# Patient Record
Sex: MALE | Race: WHITE | NOT HISPANIC OR LATINO | Employment: UNEMPLOYED | URBAN - METROPOLITAN AREA
[De-identification: names, ages, dates, MRNs, and addresses within clinical notes are randomized per-mention and may not be internally consistent; named-entity substitution may affect disease eponyms.]

---

## 2019-12-09 ENCOUNTER — HOSPITAL ENCOUNTER (EMERGENCY)
Facility: HOSPITAL | Age: 64
Discharge: NURSING, REHAB OR LONG-TERM CARE | End: 2019-12-09
Attending: EMERGENCY MEDICINE | Admitting: EMERGENCY MEDICINE

## 2019-12-09 ENCOUNTER — APPOINTMENT (OUTPATIENT)
Dept: GENERAL RADIOLOGY | Facility: HOSPITAL | Age: 64
End: 2019-12-09

## 2019-12-09 ENCOUNTER — APPOINTMENT (OUTPATIENT)
Dept: CT IMAGING | Facility: HOSPITAL | Age: 64
End: 2019-12-09

## 2019-12-09 VITALS
HEIGHT: 68 IN | OXYGEN SATURATION: 95 % | WEIGHT: 176.37 LBS | BODY MASS INDEX: 26.73 KG/M2 | TEMPERATURE: 99.8 F | HEART RATE: 79 BPM | DIASTOLIC BLOOD PRESSURE: 93 MMHG | RESPIRATION RATE: 16 BRPM | SYSTOLIC BLOOD PRESSURE: 160 MMHG
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
## 2019-12-09 DIAGNOSIS — S30.0XXA CONTUSION OF COCCYX, INITIAL ENCOUNTER: ICD-10-CM

## 2019-12-09 DIAGNOSIS — S00.03XA CONTUSION OF SCALP, INITIAL ENCOUNTER: Primary | ICD-10-CM

## 2019-12-09 DIAGNOSIS — S13.9XXA NECK SPRAIN, INITIAL ENCOUNTER: ICD-10-CM

## 2019-12-09 PROCEDURE — 72170 X-RAY EXAM OF PELVIS: CPT

## 2019-12-09 PROCEDURE — 72125 CT NECK SPINE W/O DYE: CPT

## 2019-12-09 PROCEDURE — 70450 CT HEAD/BRAIN W/O DYE: CPT

## 2019-12-09 PROCEDURE — 72110 X-RAY EXAM L-2 SPINE 4/>VWS: CPT

## 2019-12-09 PROCEDURE — 72220 X-RAY EXAM SACRUM TAILBONE: CPT

## 2019-12-09 PROCEDURE — 99283 EMERGENCY DEPT VISIT LOW MDM: CPT

## 2019-12-09 RX ORDER — ACETAMINOPHEN 500 MG
1000 TABLET ORAL ONCE
Status: DISCONTINUED | OUTPATIENT
Start: 2019-12-09 | End: 2019-12-09 | Stop reason: HOSPADM

## 2019-12-09 NOTE — DISCHARGE INSTRUCTIONS
Neurochecks every 4 hours return for vomiting mental status changes unequal pupils uncontrolled pain or any other new or worsening problems or concerns.  Tylenol.  Turn for any other new or worse problems or concerns

## 2019-12-09 NOTE — ED PROVIDER NOTES
Subjective   Chief complaint fall    History of present illness 64-year-old male fell at the rehab center in the bathroom.  No loss of conscious no syncope he hit his head complains of pain to his head and tailbone area.  Moderate degree worse with movement better with rest continuous for last couple hours.  Aching throbbing in nature.  No chest or abdominal pain.  No vomiting.  No visual changes.  No numbness or tingling.          Review of Systems   Constitutional: Negative for chills and fever.   Respiratory: Negative for chest tightness and shortness of breath.    Gastrointestinal: Negative for abdominal pain and vomiting.   Musculoskeletal: Positive for back pain and neck pain.   Skin: Negative for color change and pallor.   Neurological: Negative for dizziness and syncope.   Psychiatric/Behavioral: Negative for agitation and behavioral problems.       Past Medical History:   Diagnosis Date   • Antisocial personality disorder (CMS/HCC)    • Bipolar disorder (CMS/HCC)    • Chronic kidney disease    • Convulsions (CMS/HCC)    • Diabetes mellitus (CMS/HCC)    • Hypertension    • Insomnia    • Renal disorder    • Schizophrenia (CMS/HCC)        No Known Allergies    History reviewed. No pertinent surgical history.    History reviewed. No pertinent family history.    Social History     Socioeconomic History   • Marital status:      Spouse name: Not on file   • Number of children: Not on file   • Years of education: Not on file   • Highest education level: Not on file   Tobacco Use   • Smoking status: Current Some Day Smoker     Packs/day: 0.50   • Smokeless tobacco: Never Used   Substance and Sexual Activity   • Alcohol use: Never     Frequency: Never   • Drug use: Never   • Sexual activity: Defer       Prior to Admission medications    Not on File     No current medication list available  [place] list became available patient is on valproic acid Tylenol atenolol insulin lisinopril melatonin  Seroquel  Objective   Physical Exam  64-year-old awake alert no acute distress HEENT contusion to the back of the head but no step-off or deformity extraocular muscles intact pupils equal and reactive no raccoon or rodgers sign no facial pain some mild cervical spine tenderness but no step-off or deformity trachea midline chest wall nontender good breath sounds bilaterally heart regular without murmur abdomen soft without tenderness no bruising or masses pelvis is stable extremities full range of motion no obvious deformities throughout the arms or legs no shortening rotation to the legs.  No thoracic spine tenderness mild lumbar and tailbone tenderness but no step-off or deformity.  Pelvis is stable he is awake alert follows commands Alex Coma Scale 15  Procedures           ED Course      No results found for this or any previous visit.  Xr Sacrum & Coccyx    Result Date: 12/9/2019  No acute sacral or coccygeal findings. Degenerative changes of the lumbar spine at L4-5.  Electronically Signed By-Dr. Jaycee Ley MD On:12/9/2019 2:55 PM This report was finalized on [id number] by Dr. Jaycee Ley MD.    Ct Head Without Contrast    Result Date: 12/9/2019  1.No acute intracranial process or calvarial fracture identified. 2.Findings suggestive of mild chronic small vessel ischemic disease. 3.Mild mucosal disease along right maxillary sinus.  Electronically Signed By-DR. Javed Wiggins MD On:12/9/2019 3:22 PM This report was finalized on [id number] by DR. Javed Wiggins MD.    Ct Cervical Spine Without Contrast    Result Date: 12/9/2019  No acute fracture or traumatic subluxation of cervical spine.  Electronically Signed By-DR. Javed Wiggins MD On:12/9/2019 3:29 PM This report was finalized on [id number] by DR. Javed Wiggins MD.    Xr Pelvis 1 Or 2 View    Result Date: 12/9/2019  No acute pelvic or hip findings. Mild degenerative changes at L4-5.  Electronically Signed By-Dr. Jaycee Ley MD  On:12/9/2019 2:54 PM This report was finalized on [id number] by Dr. Jaycee Ley MD.    Xr Spine Lumbar Complete 4+vw    Result Date: 12/9/2019  Mild degenerative changes lumbar spine greatest at L4-5 and L5-S1. No acute lumbar spine findings.  Electronically Signed By-Dr. Jaycee Ley MD On:12/9/2019 2:56 PM This report was finalized on [id number] by Dr. Jaycee Ley MD.    Medications   acetaminophen (TYLENOL) tablet 1,000 mg (has no administration in time range)                       No data recorded                        MDM  Number of Diagnoses or Management Options  Contusion of coccyx, initial encounter:   Contusion of scalp, initial encounter:   Fall, initial encounter:   Neck sprain, initial encounter:   Diagnosis management comments: Medical decision making.  Patient had the above exam evaluation was given Tylenol for pain.  CT head without was negative cervical spine no fractures x-ray of the pelvis and coccyx and lumbar spine no fractures.  On repeat exam he had [place] Coma Scale 15 he was resting currently no distress.  We talked about the findings.  He looks well and he will be discharged back to the nursing home under heading precautions.  Stable unremarkable ER course.      Final diagnoses:   Contusion of scalp, initial encounter   Neck sprain, initial encounter   Contusion of coccyx, initial encounter   Fall, initial encounter              Drake Baptiste MD  12/09/19 154       Drake Baptiste MD  12/09/19 7957

## 2020-01-03 ENCOUNTER — HOSPITAL ENCOUNTER (EMERGENCY)
Facility: HOSPITAL | Age: 65
Discharge: HOME | End: 2020-01-03
Admitting: EMERGENCY MEDICINE

## 2020-01-03 ENCOUNTER — APPOINTMENT (OUTPATIENT)
Dept: GENERAL RADIOLOGY | Facility: HOSPITAL | Age: 65
End: 2020-01-03

## 2020-01-03 VITALS
OXYGEN SATURATION: 97 % | HEIGHT: 74 IN | SYSTOLIC BLOOD PRESSURE: 146 MMHG | RESPIRATION RATE: 16 BRPM | TEMPERATURE: 97.9 F | BODY MASS INDEX: 23.77 KG/M2 | DIASTOLIC BLOOD PRESSURE: 72 MMHG | HEART RATE: 82 BPM | WEIGHT: 185.19 LBS
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
## 2020-01-03 DIAGNOSIS — M25.561 ACUTE PAIN OF RIGHT KNEE: Primary | ICD-10-CM

## 2020-01-03 DIAGNOSIS — M25.461 EFFUSION OF RIGHT KNEE: ICD-10-CM

## 2020-01-03 PROCEDURE — 73562 X-RAY EXAM OF KNEE 3: CPT

## 2020-01-03 PROCEDURE — 99283 EMERGENCY DEPT VISIT LOW MDM: CPT

## 2020-01-03 NOTE — ED PROVIDER NOTES
Subjective   History of Present Illness  Patient is a 64-year-old male who is a poor historian presents via EMS with complaints of right knee pain and swelling for the past week.  Patient states he did trip and fall about a week ago and has had pain since.  Scribes as a constant sharp type pain without any radiation of the pain from his knee.  Denies any increased redness that he is noticed.  Currently rates pain is mild.  Denies any paresthesias numbness or weakness.  Patient states he does live at a nursing and rehabilitation center.  He denies any chest pain or shortness of breath  Review of Systems   Constitutional: Negative.    Respiratory: Negative.    Cardiovascular: Negative.    Musculoskeletal: Positive for arthralgias, gait problem and joint swelling. Negative for neck pain and neck stiffness.   Skin: Negative.    Neurological: Negative for dizziness, seizures, syncope, weakness, numbness and headaches.       Past Medical History:   Diagnosis Date   • Antisocial personality disorder (CMS/HCC)    • Bipolar disorder (CMS/HCC)    • Chronic kidney disease    • Convulsions (CMS/[facility])    • Diabetes mellitus (CMS/[facility])    • Hypertension    • Insomnia    • Renal disorder    • Schizophrenia (CMS/[facility])        No Known Allergies    History reviewed. No pertinent surgical history.    History reviewed. No pertinent family history.    Social History     Socioeconomic History   • Marital status:      Spouse name: Not on file   • Number of children: Not on file   • Years of education: Not on file   • Highest education level: Not on file   Tobacco Use   • Smoking status: Current Some Day Smoker     Packs/day: 0.50   • Smokeless tobacco: Never Used   Substance and Sexual Activity   • Alcohol use: Never     Frequency: Never   • Drug use: Never   • Sexual activity: Defer           Objective   Physical Exam   Constitutional: He is oriented to person, place, and time. He appears well-developed and well-nourished. No  "distress.   HENT:   Head: Normocephalic and atraumatic.   Eyes: Pupils are equal, round, and reactive to light. EOM are normal. No scleral icterus.   Cardiovascular: Normal rate, regular rhythm, normal heart sounds and intact distal pulses. Exam reveals no gallop and no friction rub.   No murmur heard.  Pulmonary/Chest: Effort normal and breath sounds normal. No stridor. He has no wheezes. He has no rales.   Musculoskeletal: He exhibits tenderness. He exhibits no edema or deformity.        Right knee: He exhibits decreased range of motion. He exhibits no swelling, no effusion, no ecchymosis, no deformity, no laceration, no erythema and normal patellar mobility.        Left knee: Normal.   Decrease range of motion with flexion extension secondary to pain.  No overlying erythema edema warmth noted of anterior posterior drawer test.  No joint laxity with varus valgus stress.  Tenderness to palpation along the anterior region.  Compartment soft negative Homans sign.   Neurological: He is alert and oriented to person, place, and time. No cranial nerve deficit or sensory deficit.   Skin: Skin is warm. Capillary refill takes less than 2 seconds. He is not diaphoretic. No erythema. No pallor.   Psychiatric: He has a normal mood and affect. His behavior is normal.   Nursing note and vitals reviewed.      Procedures           ED Course    /77   Pulse 57   Temp 97.9 °F (36.6 °C)   Resp 16   Ht 188 cm (74\")   Wt 84 kg (185 lb 3 oz)   SpO2 96%   BMI 23.78 kg/m²   Medications - No data to display  Labs Reviewed - No data to display  Xr Knee 3 View Right    Result Date: 1/3/2020  1. Moderate suprapatellar joint effusion without acute osseous abnormality. 2. Mild medial compartment joint space narrowing without significant osteophyte formation.  Electronically Signed By-Rakesh Benavidez On:1/3/2020 1:08 PM This report was finalized on [id number] by  Rakesh Benavidez, .                                            "     MDM  Number of Diagnoses or Management Options  Diagnosis management comments: Chart Review:  Comorbidity: Schizophrenia bipolar diabetes antisocial personality CKD hypertension  Differentials: Fracture dislocation sprain effusion septic arthritis    ;this list is not all inclusive and does not constitute the entirety of considered causes  Imaging: Was interpreted by physician and reviewed by myself:  Xr Knee 3 View Right  Result Date: 1/3/2020  1. Moderate suprapatellar joint effusion without acute osseous abnormality. 2. Mild medial compartment joint space narrowing without significant osteophyte formation.  Electronically Signed By-Rakesh Benavidez On:1/3/2020 1:08 PM This report was finalized on [id number] by  Rakesh Benavidez, .    Disposition/Treatment:  While in the ED patient was afebrile he appeared nontoxic there are no signs of secondary infection of the right knee.  X-ray as described above shows joint effusion with mild medial compartment joint space narrowing.  Patient was given a knee sleeve he is advised follow-up with orthopedics for further evaluation.  Patient was ambulatory while in the ED patient walked from the bed to the door asking for food.  Lab results and findings were discussed with the patient who voiced understanding of discharge instructions along with signs and symptoms requiring return to the ED.  Upon discharged patient was in stable condition with followup for a revaluation.  He was advised to continue ibuprofen as previously prescribed.      Final diagnoses:   Acute pain of right knee   Fall, initial encounter   Effusion of right knee            Chanda Salcedo PA  01/03/20 5522

## 2020-01-03 NOTE — ED NOTES
Swelling to right knee x 1 week. Patient from [facility] and had xray done there. Xray was negative. Unable to walk on right leg     Dee Emerson RN  01/03/20 [id number]

## 2020-01-03 NOTE — DISCHARGE INSTRUCTIONS
Wear knee sleeve for the next 35 days as needed for pain and comfort.  Use walker for ambulation.  Continue to use ibuprofen as previously prescribed for your pain.    Follow-up with Dr. Carter's office as listed below for further evaluation.    Follow-up with your primary care provider in 3-5 days.  If you do not have a primary care provider call [phone] for help in finding one, or you may follow up with [facility] at [phone].    Return to ED for any new or worsening symptoms including increased redness, swelling, pain of your knee or fever.

## 2020-01-28 ENCOUNTER — APPOINTMENT (OUTPATIENT)
Dept: CT IMAGING | Facility: HOSPITAL | Age: 65
End: 2020-01-28

## 2020-01-28 ENCOUNTER — HOSPITAL ENCOUNTER (EMERGENCY)
Facility: HOSPITAL | Age: 65
Discharge: NURSING, REHAB OR LONG-TERM CARE | End: 2020-01-29
Attending: EMERGENCY MEDICINE | Admitting: EMERGENCY MEDICINE

## 2020-01-28 VITALS
TEMPERATURE: 98 F | SYSTOLIC BLOOD PRESSURE: 156 MMHG | RESPIRATION RATE: 16 BRPM | OXYGEN SATURATION: 94 % | WEIGHT: 190 LBS | HEART RATE: 66 BPM | HEIGHT: 73 IN | BODY MASS INDEX: 25.18 KG/M2 | DIASTOLIC BLOOD PRESSURE: 66 MMHG

## 2020-01-28 DIAGNOSIS — S00.03XA CONTUSION OF SCALP, INITIAL ENCOUNTER: Primary | ICD-10-CM

## 2020-01-28 DIAGNOSIS — S01.81XA FACIAL LACERATION, INITIAL ENCOUNTER: ICD-10-CM

## 2020-01-28 PROCEDURE — 70450 CT HEAD/BRAIN W/O DYE: CPT

## 2020-01-28 PROCEDURE — 99283 EMERGENCY DEPT VISIT LOW MDM: CPT

## 2020-01-29 ENCOUNTER — LAB REQUISITION (OUTPATIENT)
Dept: LAB | Facility: HOSPITAL | Age: 65
End: 2020-01-29

## 2020-01-29 DIAGNOSIS — N18.9 CHRONIC KIDNEY DISEASE, UNSPECIFIED: ICD-10-CM

## 2020-01-29 DIAGNOSIS — N17.9 ACUTE KIDNEY FAILURE, UNSPECIFIED (HCC): ICD-10-CM

## 2020-01-29 DIAGNOSIS — E11.9 TYPE 2 DIABETES MELLITUS WITHOUT COMPLICATIONS (HCC): ICD-10-CM

## 2020-01-29 LAB
ALBUMIN SERPL-MCNC: 3.1 G/DL (ref 3.5–5.2)
ALBUMIN/GLOB SERPL: 0.9 G/DL
ALP SERPL-CCNC: 90 U/L (ref 39–117)
ALT SERPL W P-5'-P-CCNC: 23 U/L (ref 1–41)
ANION GAP SERPL CALCULATED.3IONS-SCNC: 9 MMOL/L (ref 5–15)
AST SERPL-CCNC: 27 U/L (ref 1–40)
BASOPHILS # BLD AUTO: 0 10*3/MM3 (ref 0–0.2)
BASOPHILS NFR BLD AUTO: 0.6 % (ref 0–1.5)
BILIRUB SERPL-MCNC: 0.4 MG/DL (ref 0.2–1.2)
BUN BLD-MCNC: 13 MG/DL (ref 8–23)
BUN/CREAT SERPL: 13.1 (ref 7–25)
CALCIUM SPEC-SCNC: 8.9 MG/DL (ref 8.6–10.5)
CHLORIDE SERPL-SCNC: 102 MMOL/L (ref 98–107)
CO2 SERPL-SCNC: 30 MMOL/L (ref 22–29)
CREAT BLD-MCNC: 0.99 MG/DL (ref 0.76–1.27)
DEPRECATED RDW RBC AUTO: 53.8 FL (ref 37–54)
EOSINOPHIL # BLD AUTO: 0 10*3/MM3 (ref 0–0.4)
EOSINOPHIL NFR BLD AUTO: 0.8 % (ref 0.3–6.2)
ERYTHROCYTE [DISTWIDTH] IN BLOOD BY AUTOMATED COUNT: 17.1 % (ref 12.3–15.4)
GFR SERPL CREATININE-BSD FRML MDRD: 77 ML/MIN/1.73
GLOBULIN UR ELPH-MCNC: 3.5 GM/DL
GLUCOSE BLD-MCNC: 137 MG/DL (ref 65–99)
HCT VFR BLD AUTO: 32.2 % (ref 37.5–51)
HGB BLD-MCNC: 10.7 G/DL (ref 13–17.7)
LYMPHOCYTES # BLD AUTO: 1.6 10*3/MM3 (ref 0.7–3.1)
LYMPHOCYTES NFR BLD AUTO: 45 % (ref 19.6–45.3)
MCH RBC QN AUTO: 30 PG (ref 26.6–33)
MCHC RBC AUTO-ENTMCNC: 33.2 G/DL (ref 31.5–35.7)
MCV RBC AUTO: 90.4 FL (ref 79–97)
MONOCYTES # BLD AUTO: 0.3 10*3/MM3 (ref 0.1–0.9)
MONOCYTES NFR BLD AUTO: 7.7 % (ref 5–12)
NEUTROPHILS # BLD AUTO: 1.6 10*3/MM3 (ref 1.7–7)
NEUTROPHILS NFR BLD AUTO: 45.9 % (ref 42.7–76)
NRBC BLD AUTO-RTO: 0.1 /100 WBC (ref 0–0.2)
PLATELET # BLD AUTO: 81 10*3/MM3 (ref 140–450)
PMV BLD AUTO: 8.9 FL (ref 6–12)
POTASSIUM BLD-SCNC: 4.3 MMOL/L (ref 3.5–5.2)
PROT SERPL-MCNC: 6.6 G/DL (ref 6–8.5)
RBC # BLD AUTO: 3.57 10*6/MM3 (ref 4.14–5.8)
SODIUM BLD-SCNC: 141 MMOL/L (ref 136–145)
WBC NRBC COR # BLD: 3.6 10*3/MM3 (ref 3.4–10.8)

## 2020-01-29 PROCEDURE — 85025 COMPLETE CBC W/AUTO DIFF WBC: CPT | Performed by: INTERNAL MEDICINE

## 2020-01-29 PROCEDURE — 80053 COMPREHEN METABOLIC PANEL: CPT | Performed by: INTERNAL MEDICINE

## 2020-02-29 ENCOUNTER — APPOINTMENT (OUTPATIENT)
Dept: CT IMAGING | Facility: HOSPITAL | Age: 65
End: 2020-02-29

## 2020-02-29 ENCOUNTER — APPOINTMENT (OUTPATIENT)
Dept: GENERAL RADIOLOGY | Facility: HOSPITAL | Age: 65
End: 2020-02-29

## 2020-02-29 ENCOUNTER — HOSPITAL ENCOUNTER (EMERGENCY)
Facility: HOSPITAL | Age: 65
Discharge: HOME | End: 2020-02-29
Attending: EMERGENCY MEDICINE | Admitting: EMERGENCY MEDICINE

## 2020-02-29 VITALS
WEIGHT: 172 LBS | SYSTOLIC BLOOD PRESSURE: 189 MMHG | OXYGEN SATURATION: 96 % | HEART RATE: 84 BPM | DIASTOLIC BLOOD PRESSURE: 92 MMHG | BODY MASS INDEX: 25.48 KG/M2 | TEMPERATURE: 98 F | RESPIRATION RATE: 16 BRPM | HEIGHT: 69 IN

## 2020-02-29 DIAGNOSIS — S09.90XA INJURY OF HEAD, INITIAL ENCOUNTER: ICD-10-CM

## 2020-02-29 DIAGNOSIS — R03.0 ELEVATED BLOOD PRESSURE READING: ICD-10-CM
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
## 2020-02-29 DIAGNOSIS — S39.012A LUMBOSACRAL STRAIN, INITIAL ENCOUNTER: ICD-10-CM

## 2020-02-29 LAB
BASOPHILS # BLD AUTO: 0 10*3/MM3 (ref 0–0.2)
BASOPHILS NFR BLD AUTO: 0.5 % (ref 0–1.5)
DEPRECATED RDW RBC AUTO: 52.5 FL (ref 37–54)
EOSINOPHIL # BLD AUTO: 0 10*3/MM3 (ref 0–0.4)
EOSINOPHIL NFR BLD AUTO: 0.2 % (ref 0.3–6.2)
ERYTHROCYTE [DISTWIDTH] IN BLOOD BY AUTOMATED COUNT: 16.8 % (ref 12.3–15.4)
FLUAV SUBTYP SPEC NAA+PROBE: NOT DETECTED
FLUBV RNA ISLT QL NAA+PROBE: NOT DETECTED
HCT VFR BLD AUTO: 31.2 % (ref 37.5–51)
HGB BLD-MCNC: 10.6 G/DL (ref 13–17.7)
INR PPP: 1.08 (ref 0.9–1.1)
LYMPHOCYTES # BLD AUTO: 1.3 10*3/MM3 (ref 0.7–3.1)
LYMPHOCYTES NFR BLD AUTO: 30.8 % (ref 19.6–45.3)
MCH RBC QN AUTO: 30.1 PG (ref 26.6–33)
MCHC RBC AUTO-ENTMCNC: 34 G/DL (ref 31.5–35.7)
MCV RBC AUTO: 88.7 FL (ref 79–97)
MONOCYTES # BLD AUTO: 0.4 10*3/MM3 (ref 0.1–0.9)
MONOCYTES NFR BLD AUTO: 8.9 % (ref 5–12)
NEUTROPHILS # BLD AUTO: 2.5 10*3/MM3 (ref 1.7–7)
NEUTROPHILS NFR BLD AUTO: 59.6 % (ref 42.7–76)
NRBC BLD AUTO-RTO: 0 /100 WBC (ref 0–0.2)
PLATELET # BLD AUTO: 110 10*3/MM3 (ref 140–450)
PMV BLD AUTO: 8.4 FL (ref 6–12)
PROTHROMBIN TIME: 11.2 SECONDS (ref 9.6–11.7)
RBC # BLD AUTO: 3.52 10*6/MM3 (ref 4.14–5.8)
TROPONIN T SERPL-MCNC: <0.01 NG/ML (ref 0–0.03)
WBC NRBC COR # BLD: 4.3 10*3/MM3 (ref 3.4–10.8)

## 2020-02-29 PROCEDURE — 85025 COMPLETE CBC W/AUTO DIFF WBC: CPT | Performed by: EMERGENCY MEDICINE

## 2020-02-29 PROCEDURE — 71045 X-RAY EXAM CHEST 1 VIEW: CPT

## 2020-02-29 PROCEDURE — 85610 PROTHROMBIN TIME: CPT | Performed by: EMERGENCY MEDICINE

## 2020-02-29 PROCEDURE — 72125 CT NECK SPINE W/O DYE: CPT

## 2020-02-29 PROCEDURE — 72170 X-RAY EXAM OF PELVIS: CPT

## 2020-02-29 PROCEDURE — 72110 X-RAY EXAM L-2 SPINE 4/>VWS: CPT

## 2020-02-29 PROCEDURE — 99284 EMERGENCY DEPT VISIT MOD MDM: CPT

## 2020-02-29 PROCEDURE — 70450 CT HEAD/BRAIN W/O DYE: CPT

## 2020-02-29 PROCEDURE — 84484 ASSAY OF TROPONIN QUANT: CPT | Performed by: EMERGENCY MEDICINE

## 2020-02-29 PROCEDURE — 87502 INFLUENZA DNA AMP PROBE: CPT | Performed by: EMERGENCY MEDICINE

## 2020-02-29 RX ORDER — SODIUM CHLORIDE 0.9 % (FLUSH) 0.9 %
10 SYRINGE (ML) INJECTION AS NEEDED
Status: DISCONTINUED | OUTPATIENT
Start: 2020-02-29 | End: 2020-03-01 | Stop reason: HOSPADM

## 2020-08-09 ENCOUNTER — APPOINTMENT (OUTPATIENT)
Dept: GENERAL RADIOLOGY | Facility: HOSPITAL | Age: 65
End: 2020-08-09

## 2020-08-09 ENCOUNTER — HOSPITAL ENCOUNTER (EMERGENCY)
Facility: HOSPITAL | Age: 65
Discharge: HOME | End: 2020-08-09
Attending: EMERGENCY MEDICINE | Admitting: EMERGENCY MEDICINE

## 2020-08-09 VITALS
TEMPERATURE: 98 F | HEIGHT: 69 IN | DIASTOLIC BLOOD PRESSURE: 81 MMHG | OXYGEN SATURATION: 99 % | RESPIRATION RATE: 22 BRPM | BODY MASS INDEX: 25.18 KG/M2 | SYSTOLIC BLOOD PRESSURE: 175 MMHG | WEIGHT: 170 LBS | HEART RATE: 66 BPM

## 2020-08-09 DIAGNOSIS — J44.1 COPD WITH ACUTE EXACERBATION (HCC): Primary | ICD-10-CM

## 2020-08-09 LAB
ALBUMIN SERPL-MCNC: 3.8 G/DL (ref 3.5–5.2)
ALBUMIN/GLOB SERPL: 1.1 G/DL
ALP SERPL-CCNC: 83 U/L (ref 39–117)
ALT SERPL W P-5'-P-CCNC: 35 U/L (ref 1–41)
ANION GAP SERPL CALCULATED.3IONS-SCNC: 10 MMOL/L (ref 5–15)
ARTERIAL PATENCY WRIST A: POSITIVE
AST SERPL-CCNC: 38 U/L (ref 1–40)
ATMOSPHERIC PRESS: ABNORMAL MM[HG]
B PARAPERT DNA SPEC QL NAA+PROBE: NOT DETECTED
B PERT DNA SPEC QL NAA+PROBE: NOT DETECTED
BASE EXCESS BLDA CALC-SCNC: 0.8 MMOL/L (ref 0–3)
BASOPHILS # BLD AUTO: 0 10*3/MM3 (ref 0–0.2)
BASOPHILS NFR BLD AUTO: 0.7 % (ref 0–1.5)
BDY SITE: ABNORMAL
BILIRUB SERPL-MCNC: 0.4 MG/DL (ref 0–1.2)
BUN SERPL-MCNC: 24 MG/DL (ref 8–23)
BUN SERPL-MCNC: ABNORMAL MG/DL
BUN/CREAT SERPL: ABNORMAL
C PNEUM DNA NPH QL NAA+NON-PROBE: NOT DETECTED
CALCIUM SPEC-SCNC: 9.1 MG/DL (ref 8.6–10.5)
CHLORIDE SERPL-SCNC: 106 MMOL/L (ref 98–107)
CO2 BLDA-SCNC: 29.2 MMOL/L (ref 22–29)
CO2 SERPL-SCNC: 26 MMOL/L (ref 22–29)
CREAT SERPL-MCNC: 1.02 MG/DL (ref 0.76–1.27)
D-LACTATE SERPL-SCNC: 0.9 MMOL/L (ref 0.5–2)
DEPRECATED RDW RBC AUTO: 52.5 FL (ref 37–54)
EOSINOPHIL # BLD AUTO: 0 10*3/MM3 (ref 0–0.4)
EOSINOPHIL NFR BLD AUTO: 0.8 % (ref 0.3–6.2)
ERYTHROCYTE [DISTWIDTH] IN BLOOD BY AUTOMATED COUNT: 16.7 % (ref 12.3–15.4)
FLUAV H1 2009 PAND RNA NPH QL NAA+PROBE: NOT DETECTED
FLUAV H1 HA GENE NPH QL NAA+PROBE: NOT DETECTED
FLUAV H3 RNA NPH QL NAA+PROBE: NOT DETECTED
FLUAV SUBTYP SPEC NAA+PROBE: NOT DETECTED
FLUBV RNA ISLT QL NAA+PROBE: NOT DETECTED
GFR SERPL CREATININE-BSD FRML MDRD: 74 ML/MIN/1.73
GLOBULIN UR ELPH-MCNC: 3.6 GM/DL
GLUCOSE SERPL-MCNC: 126 MG/DL (ref 65–99)
HADV DNA SPEC NAA+PROBE: NOT DETECTED
HCO3 BLDA-SCNC: 27.6 MMOL/L (ref 21–28)
HCOV 229E RNA SPEC QL NAA+PROBE: NOT DETECTED
HCOV HKU1 RNA SPEC QL NAA+PROBE: NOT DETECTED
HCOV NL63 RNA SPEC QL NAA+PROBE: NOT DETECTED
HCOV OC43 RNA SPEC QL NAA+PROBE: NOT DETECTED
HCT VFR BLD AUTO: 36.1 % (ref 37.5–51)
HEMODILUTION: NO
HGB BLD-MCNC: 12.1 G/DL (ref 13–17.7)
HMPV RNA NPH QL NAA+NON-PROBE: NOT DETECTED
HPIV1 RNA SPEC QL NAA+PROBE: NOT DETECTED
HPIV2 RNA SPEC QL NAA+PROBE: NOT DETECTED
HPIV3 RNA NPH QL NAA+PROBE: NOT DETECTED
HPIV4 P GENE NPH QL NAA+PROBE: NOT DETECTED
INHALED O2 CONCENTRATION: 28 %
LYMPHOCYTES # BLD AUTO: 1.5 10*3/MM3 (ref 0.7–3.1)
LYMPHOCYTES NFR BLD AUTO: 38.4 % (ref 19.6–45.3)
M PNEUMO IGG SER IA-ACNC: NOT DETECTED
MAGNESIUM SERPL-MCNC: 2 MG/DL (ref 1.6–2.4)
MCH RBC QN AUTO: 30.1 PG (ref 26.6–33)
MCHC RBC AUTO-ENTMCNC: 33.5 G/DL (ref 31.5–35.7)
MCV RBC AUTO: 90 FL (ref 79–97)
MODALITY: ABNORMAL
MONOCYTES # BLD AUTO: 0.3 10*3/MM3 (ref 0.1–0.9)
MONOCYTES NFR BLD AUTO: 7.1 % (ref 5–12)
NEUTROPHILS NFR BLD AUTO: 2.1 10*3/MM3 (ref 1.7–7)
NEUTROPHILS NFR BLD AUTO: 53 % (ref 42.7–76)
NRBC BLD AUTO-RTO: 0.2 /100 WBC (ref 0–0.2)
NT-PROBNP SERPL-MCNC: 1925 PG/ML (ref 0–900)
PCO2 BLDA: 52.2 MM HG (ref 35–48)
PH BLDA: 7.33 PH UNITS (ref 7.35–7.45)
PLATELET # BLD AUTO: 90 10*3/MM3 (ref 140–450)
PMV BLD AUTO: 8.9 FL (ref 6–12)
PO2 BLDA: 91.2 MM HG (ref 83–108)
POTASSIUM SERPL-SCNC: 4.1 MMOL/L (ref 3.5–5.2)
PROT SERPL-MCNC: 7.4 G/DL (ref 6–8.5)
RBC # BLD AUTO: 4.01 10*6/MM3 (ref 4.14–5.8)
RHINOVIRUS RNA SPEC NAA+PROBE: NOT DETECTED
RSV RNA NPH QL NAA+NON-PROBE: NOT DETECTED
SAO2 % BLDCOA: 96.3 % (ref 94–98)
SARS-COV-2 RNA PNL SPEC NAA+PROBE: NOT DETECTED
SODIUM SERPL-SCNC: 142 MMOL/L (ref 136–145)
TROPONIN T SERPL-MCNC: <0.01 NG/ML (ref 0–0.03)
VALPROATE SERPL-MCNC: 68.3 MCG/ML (ref 50–125)
WBC # BLD AUTO: 4 10*3/MM3 (ref 3.4–10.8)

## 2020-08-09 PROCEDURE — 83880 ASSAY OF NATRIURETIC PEPTIDE: CPT | Performed by: EMERGENCY MEDICINE

## 2020-08-09 PROCEDURE — 82803 BLOOD GASES ANY COMBINATION: CPT

## 2020-08-09 PROCEDURE — 93005 ELECTROCARDIOGRAM TRACING: CPT | Performed by: EMERGENCY MEDICINE

## 2020-08-09 PROCEDURE — 93005 ELECTROCARDIOGRAM TRACING: CPT

## 2020-08-09 PROCEDURE — 80053 COMPREHEN METABOLIC PANEL: CPT | Performed by: EMERGENCY MEDICINE

## 2020-08-09 PROCEDURE — 99284 EMERGENCY DEPT VISIT MOD MDM: CPT

## 2020-08-09 PROCEDURE — 36600 WITHDRAWAL OF ARTERIAL BLOOD: CPT

## 2020-08-09 PROCEDURE — 0202U NFCT DS 22 TRGT SARS-COV-2: CPT | Performed by: EMERGENCY MEDICINE

## 2020-08-09 PROCEDURE — 25010000002 METHYLPREDNISOLONE PER 125 MG: Performed by: EMERGENCY MEDICINE

## 2020-08-09 PROCEDURE — 85025 COMPLETE CBC W/AUTO DIFF WBC: CPT | Performed by: EMERGENCY MEDICINE

## 2020-08-09 PROCEDURE — 94799 UNLISTED PULMONARY SVC/PX: CPT

## 2020-08-09 PROCEDURE — 71045 X-RAY EXAM CHEST 1 VIEW: CPT

## 2020-08-09 PROCEDURE — 84484 ASSAY OF TROPONIN QUANT: CPT | Performed by: EMERGENCY MEDICINE

## 2020-08-09 PROCEDURE — 96374 THER/PROPH/DIAG INJ IV PUSH: CPT

## 2020-08-09 PROCEDURE — 83735 ASSAY OF MAGNESIUM: CPT | Performed by: EMERGENCY MEDICINE

## 2020-08-09 PROCEDURE — 83605 ASSAY OF LACTIC ACID: CPT

## 2020-08-09 PROCEDURE — 80164 ASSAY DIPROPYLACETIC ACD TOT: CPT | Performed by: EMERGENCY MEDICINE

## 2020-08-09 RX ORDER — ALBUTEROL SULFATE 90 UG/1
2 AEROSOL, METERED RESPIRATORY (INHALATION) ONCE
Status: COMPLETED | OUTPATIENT
Start: 2020-08-09 | End: 2020-08-09

## 2020-08-09 RX ORDER — SODIUM CHLORIDE 0.9 % (FLUSH) 0.9 %
10 SYRINGE (ML) INJECTION AS NEEDED
Status: DISCONTINUED | OUTPATIENT
Start: 2020-08-09 | End: 2020-08-09 | Stop reason: HOSPADM

## 2020-08-09 RX ORDER — ALBUTEROL SULFATE 90 UG/1
2 AEROSOL, METERED RESPIRATORY (INHALATION) EVERY 4 HOURS PRN
Qty: 1 INHALER | Refills: 0 | Status: SHIPPED | OUTPATIENT
Start: 2020-08-09 | End: 2020-08-14

## 2020-08-09 RX ORDER — METHYLPREDNISOLONE SODIUM SUCCINATE 125 MG/2ML
125 INJECTION, POWDER, LYOPHILIZED, FOR SOLUTION INTRAMUSCULAR; INTRAVENOUS ONCE
Status: COMPLETED | OUTPATIENT
Start: 2020-08-09 | End: 2020-08-09

## 2020-08-09 RX ORDER — DOXYCYCLINE 100 MG/1
100 CAPSULE ORAL 2 TIMES DAILY
Qty: 14 CAPSULE | Refills: 0 | Status: SHIPPED | OUTPATIENT
Start: 2020-08-09 | End: 2020-08-16

## 2020-08-09 RX ADMIN — ALBUTEROL SULFATE 2 PUFF: 90 AEROSOL, METERED RESPIRATORY (INHALATION) at 04:50

## 2020-08-09 RX ADMIN — METHYLPREDNISOLONE SODIUM SUCCINATE 125 MG: 125 INJECTION, POWDER, FOR SOLUTION INTRAMUSCULAR; INTRAVENOUS at 05:05

## 2020-08-09 NOTE — ED NOTES
Attempted to call report [facility]. Unable to speak with a nurse. The CNA reported the nurse was too busy to speak for report. Left phone number for nurse to call back for report.      Dione Avendaño RN  08/09/20 6583

## 2020-08-09 NOTE — ED NOTES
[facility] LEG# [id number].  [facility] called and info given.     Christina Griffin, RegSched Rep  08/09/20 0759

## 2020-08-09 NOTE — ED PROVIDER NOTES
Subjective   History of Present Illness  65-year-old male from an assisted living facility complains of an onset of shortness of breath tonight.  He has had a cough but denies any sputum production fever chills nausea vomiting or diarrhea.  The patient denies a history of COPD but he does smoke heavily.  The patient also has a history of seizure disorder as well as chronic renal insufficiency diabetes hypertension schizophrenia and bipolar disorder.  Review of Systems  Patient is not able to cooperate with this  Past Medical History:   Diagnosis Date   • Antisocial personality disorder (CMS/HCC)    • Bipolar disorder (CMS/HCC)    • Chronic kidney disease    • Convulsions (CMS/HCC)    • Diabetes mellitus (CMS/HCC)    • Hypertension    • Insomnia    • Renal disorder    • Schizophrenia (CMS/HCC)        No Known Allergies    No past surgical history on file.    No family history on file.    Social History     Socioeconomic History   • Marital status:      Spouse name: Not on file   • Number of children: Not on file   • Years of education: Not on file   • Highest education level: Not on file   Tobacco Use   • Smoking status: Current Some Day Smoker     Packs/day: 0.50   • Smokeless tobacco: Never Used   Substance and Sexual Activity   • Alcohol use: Never     Frequency: Never   • Drug use: Never   • Sexual activity: Defer           Objective   Physical Exam  On exam he is awake and alert he was afebrile his initial blood pressure was 203/101 with a heart rate of 72.  The O2 sat was 100% HEENT exam pupils equal round reactive to light EOMs are full ENT shows no exudate neck is supple without JVD is chest reveals bilateral wheezes as well as a few rhonchi.  Cardiovascular exam reveals a regular rhythm without a gallop or murmur he has good distal pulses his abdomen was soft nontender he has no cyanosis clubbing or edema full range of motion of the extremities no acute focal deficit was noted  Procedures            ED Course            Results for orders placed or performed during the hospital encounter of 08/09/20   Respiratory Panel PCR w/COVID-19(SARS-CoV-2) [facility]ACK/WILBERT/[facility]I In-House, NP Swab in UTM/VTP, 3-4 Hr TAT - Swab, Nasopharynx   Result Value Ref Range    ADENOVIRUS, PCR Not Detected Not Detected    Coronavirus 229E Not Detected Not Detected    Coronavirus HKU1 Not Detected Not Detected    Coronavirus NL63 Not Detected Not Detected    Coronavirus OC43 Not Detected Not Detected    COVID19 Not Detected Not Detected - Ref. Range    Human Metapneumovirus Not Detected Not Detected    Human Rhinovirus/Enterovirus Not Detected Not Detected    Influenza A PCR Not Detected Not Detected    Influenza A H1 Not Detected Not Detected    Influenza A H1 2009 PCR Not Detected Not Detected    Influenza A H3 Not Detected Not Detected    Influenza B PCR Not Detected Not Detected    Parainfluenza Virus 1 Not Detected Not Detected    Parainfluenza Virus 2 Not Detected Not Detected    Parainfluenza Virus 3 Not Detected Not Detected    Parainfluenza Virus 4 Not Detected Not Detected    RSV, PCR Not Detected Not Detected    Bordetella pertussis pcr Not Detected Not Detected    Bordetella parapertussis PCR Not Detected Not Detected    Chlamydophila pneumoniae PCR Not Detected Not Detected    Mycoplasma pneumo by PCR Not Detected Not Detected   Comprehensive Metabolic Panel   Result Value Ref Range    Glucose 126 (H) 65 - 99 mg/dL    BUN      Creatinine 1.02 0.76 - 1.27 mg/dL    Sodium 142 136 - 145 mmol/L    Potassium 4.1 3.5 - 5.2 mmol/L    Chloride 106 98 - 107 mmol/L    CO2 26.0 22.0 - 29.0 mmol/L    Calcium 9.1 8.6 - 10.5 mg/dL    Total Protein 7.4 6.0 - 8.5 g/dL    Albumin 3.80 3.50 - 5.20 g/dL    ALT (SGPT) 35 1 - 41 U/L    AST (SGOT) 38 1 - 40 U/L    Alkaline Phosphatase 83 39 - 117 U/L    Total Bilirubin 0.4 0.0 - 1.2 mg/dL    eGFR Non African Amer 74 >60 mL/min/1.73    Globulin 3.6 gm/dL    A/G Ratio 1.1 g/dL    BUN/Creatinine Ratio       Anion Gap 10.0 5.0 - 15.0 mmol/L   BNP   Result Value Ref Range    proBNP 1,925.0 (H) 0.0 - 900.0 pg/mL   Troponin   Result Value Ref Range    Troponin T <0.010 0.000 - 0.030 ng/mL   Magnesium   Result Value Ref Range    Magnesium 2.0 1.6 - 2.4 mg/dL   Valproic Acid Level, Total   Result Value Ref Range    Valproic Acid 68.3 50.0 - 125.0 mcg/mL   CBC Auto Differential   Result Value Ref Range    WBC 4.00 3.40 - 10.80 10*3/mm3    RBC 4.01 (L) 4.14 - 5.80 10*6/mm3    Hemoglobin 12.1 (L) 13.0 - 17.7 g/dL    Hematocrit 36.1 (L) 37.5 - 51.0 %    MCV 90.0 79.0 - 97.0 fL    MCH 30.1 26.6 - 33.0 pg    MCHC 33.5 31.5 - 35.7 g/dL    RDW 16.7 (H) 12.3 - 15.4 %    RDW-SD 52.5 37.0 - 54.0 fl    MPV 8.9 6.0 - 12.0 fL    Platelets 90 (L) 140 - 450 10*3/mm3    Neutrophil % 53.0 42.7 - 76.0 %    Lymphocyte % 38.4 19.6 - 45.3 %    Monocyte % 7.1 5.0 - 12.0 %    Eosinophil % 0.8 0.3 - 6.2 %    Basophil % 0.7 0.0 - 1.5 %    Neutrophils, Absolute 2.10 1.70 - 7.00 10*3/mm3    Lymphocytes, Absolute 1.50 0.70 - 3.10 10*3/mm3    Monocytes, Absolute 0.30 0.10 - 0.90 10*3/mm3    Eosinophils, Absolute 0.00 0.00 - 0.40 10*3/mm3    Basophils, Absolute 0.00 0.00 - 0.20 10*3/mm3    nRBC 0.2 0.0 - 0.2 /100 WBC   Blood Gas, Arterial   Result Value Ref Range    Site Right Radial     Ricardo's Test Positive     pH, Arterial 7.331 (L) 7.350 - 7.450 pH units    pCO2, Arterial 52.2 (H) 35.0 - 48.0 mm Hg    pO2, Arterial 91.2 83.0 - 108.0 mm Hg    HCO3, Arterial 27.6 21.0 - 28.0 mmol/L    Base Excess, Arterial 0.8 0.0 - 3.0 mmol/L    O2 Saturation, Arterial 96.3 94.0 - 98.0 %    CO2 Content 29.2 (H) 22 - 29 mmol/L    Barometric Pressure for Blood Gas      Modality Cannula     FIO2 28 %    Hemodilution No    BUN   Result Value Ref Range    BUN 24 (H) 8 - 23 mg/dL   POC Lactate   Result Value Ref Range    Lactate 0.9 0.5 - 2.0 mmol/L     Medications   sodium chloride 0.9 % flush 10 mL (has no administration in time range)   albuterol sulfate HFA  (PROVENTIL HFA;VENTOLIN HFA;PROAIR HFA) inhaler 2 puff (2 puffs Inhalation Given 8/9/20 0450)   methylPREDNISolone sodium succinate (SOLU-Medrol) injection 125 mg (125 mg Intravenous Given 8/9/20 0505)     No radiology results for the last day  Chest x-ray shows COPD                                MDM  Patient was treated with bronchodilators in the emergency department his lab work was unremarkable except for an elevation of his BNP at 1000.  The patient stated that he was breathing much better.  I did not see any evidence of a pneumonia.  The patient will be allowed to go home with an inhaler as well as doxycycline.  Final diagnoses:   COPD with acute exacerbation (CMS/[facility])            Maikol Feliz MD  08/09/20 6105

## 2020-08-09 NOTE — DISCHARGE INSTRUCTIONS
Doxycycline 2 times a day for 7 days.  2 puffs of the albuterol inhaler every 4 hours as needed for wheezing and shortness of breath.  Follow-up with your primary care provider if not improved in 3 days

## 2021-01-01 ENCOUNTER — HOSPITAL ENCOUNTER (INPATIENT)
Facility: HOSPITAL | Age: 66
LOS: 7 days | Discharge: NURSING, REHAB OR LONG-TERM CARE | End: 2021-01-08
Attending: EMERGENCY MEDICINE | Admitting: INTERNAL MEDICINE

## 2021-01-01 ENCOUNTER — APPOINTMENT (OUTPATIENT)
Dept: CT IMAGING | Facility: HOSPITAL | Age: 66
End: 2021-01-01

## 2021-01-01 ENCOUNTER — APPOINTMENT (OUTPATIENT)
Dept: GENERAL RADIOLOGY | Facility: HOSPITAL | Age: 66
End: 2021-01-01

## 2021-01-01 DIAGNOSIS — N17.9 ACUTE KIDNEY INJURY (HCC): ICD-10-CM

## 2021-01-01 DIAGNOSIS — R53.1 WEAKNESS: ICD-10-CM
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
## 2021-01-01 DIAGNOSIS — N39.0 URINARY TRACT INFECTION WITH HEMATURIA, SITE UNSPECIFIED: ICD-10-CM

## 2021-01-01 DIAGNOSIS — R31.9 URINARY TRACT INFECTION WITH HEMATURIA, SITE UNSPECIFIED: ICD-10-CM

## 2021-01-01 LAB
ALBUMIN SERPL-MCNC: 3.1 G/DL (ref 3.5–5.2)
ALBUMIN/GLOB SERPL: 0.8 G/DL
ALP SERPL-CCNC: 100 U/L (ref 39–117)
ALT SERPL W P-5'-P-CCNC: 12 U/L (ref 1–41)
AMMONIA BLD-SCNC: 28 UMOL/L (ref 16–60)
ANION GAP SERPL CALCULATED.3IONS-SCNC: 10 MMOL/L (ref 5–15)
ANISOCYTOSIS BLD QL: ABNORMAL
AST SERPL-CCNC: 22 U/L (ref 1–40)
BACTERIA UR QL AUTO: ABNORMAL /HPF
BASOPHILS # BLD MANUAL: 0.15 10*3/MM3 (ref 0–0.2)
BASOPHILS NFR BLD AUTO: 1 % (ref 0–1.5)
BILIRUB SERPL-MCNC: 0.4 MG/DL (ref 0–1.2)
BILIRUB UR QL STRIP: NEGATIVE
BUN SERPL-MCNC: 35 MG/DL (ref 8–23)
BUN/CREAT SERPL: 15.6 (ref 7–25)
CALCIUM SPEC-SCNC: 8.9 MG/DL (ref 8.6–10.5)
CHLORIDE SERPL-SCNC: 99 MMOL/L (ref 98–107)
CK SERPL-CCNC: 92 U/L (ref 20–200)
CLARITY UR: ABNORMAL
CO2 SERPL-SCNC: 24 MMOL/L (ref 22–29)
COLOR UR: YELLOW
CREAT SERPL-MCNC: 2.24 MG/DL (ref 0.76–1.27)
DEPRECATED RDW RBC AUTO: 50.3 FL (ref 37–54)
ERYTHROCYTE [DISTWIDTH] IN BLOOD BY AUTOMATED COUNT: 16.2 % (ref 12.3–15.4)
GFR SERPL CREATININE-BSD FRML MDRD: 30 ML/MIN/1.73
GLOBULIN UR ELPH-MCNC: 3.7 GM/DL
GLUCOSE SERPL-MCNC: 115 MG/DL (ref 65–99)
GLUCOSE UR STRIP-MCNC: NEGATIVE MG/DL
HCT VFR BLD AUTO: 31.3 % (ref 37.5–51)
HGB BLD-MCNC: 10.4 G/DL (ref 13–17.7)
HGB UR QL STRIP.AUTO: ABNORMAL
HOLD SPECIMEN: NORMAL
HYALINE CASTS UR QL AUTO: ABNORMAL /LPF
KETONES UR QL STRIP: ABNORMAL
LEUKOCYTE ESTERASE UR QL STRIP.AUTO: ABNORMAL
LYMPHOCYTES # BLD MANUAL: 1.07 10*3/MM3 (ref 0.7–3.1)
LYMPHOCYTES NFR BLD MANUAL: 17 % (ref 5–12)
LYMPHOCYTES NFR BLD MANUAL: 7 % (ref 19.6–45.3)
MCH RBC QN AUTO: 29.6 PG (ref 26.6–33)
MCHC RBC AUTO-ENTMCNC: 33.2 G/DL (ref 31.5–35.7)
MCV RBC AUTO: 89.1 FL (ref 79–97)
METAMYELOCYTES NFR BLD MANUAL: 1 % (ref 0–0)
MONOCYTES # BLD AUTO: 2.6 10*3/MM3 (ref 0.1–0.9)
NEUTROPHILS # BLD AUTO: 11.32 10*3/MM3 (ref 1.7–7)
NEUTROPHILS NFR BLD MANUAL: 44 % (ref 42.7–76)
NEUTS BAND NFR BLD MANUAL: 30 % (ref 0–5)
NITRITE UR QL STRIP: POSITIVE
PH UR STRIP.AUTO: 6 [PH] (ref 5–8)
PLAT MORPH BLD: NORMAL
PLATELET # BLD AUTO: 162 10*3/MM3 (ref 140–450)
PMV BLD AUTO: 7.5 FL (ref 6–12)
POTASSIUM SERPL-SCNC: 4.7 MMOL/L (ref 3.5–5.2)
PROT SERPL-MCNC: 6.8 G/DL (ref 6–8.5)
PROT UR QL STRIP: ABNORMAL
QT INTERVAL: 371 MS
RBC # BLD AUTO: 3.51 10*6/MM3 (ref 4.14–5.8)
RBC # UR: ABNORMAL /HPF
REF LAB TEST METHOD: ABNORMAL
SARS-COV-2 RNA PNL SPEC NAA+PROBE: NORMAL
SCAN SLIDE: NORMAL
SODIUM SERPL-SCNC: 133 MMOL/L (ref 136–145)
SP GR UR STRIP: 1.01 (ref 1–1.03)
SQUAMOUS #/AREA URNS HPF: ABNORMAL /HPF
UROBILINOGEN UR QL STRIP: ABNORMAL
VALPROATE SERPL-MCNC: 50.2 MCG/ML (ref 50–125)
WBC # BLD AUTO: 15.3 10*3/MM3 (ref 3.4–10.8)
WBC MORPH BLD: NORMAL
WBC UR QL AUTO: ABNORMAL /HPF
WHOLE BLOOD HOLD SPECIMEN: NORMAL

## 2021-01-01 PROCEDURE — 85007 BL SMEAR W/DIFF WBC COUNT: CPT | Performed by: EMERGENCY MEDICINE

## 2021-01-01 PROCEDURE — 25010000002 ENOXAPARIN PER 10 MG: Performed by: NURSE PRACTITIONER

## 2021-01-01 PROCEDURE — 81001 URINALYSIS AUTO W/SCOPE: CPT | Performed by: EMERGENCY MEDICINE

## 2021-01-01 PROCEDURE — 82550 ASSAY OF CK (CPK): CPT | Performed by: EMERGENCY MEDICINE

## 2021-01-01 PROCEDURE — 87088 URINE BACTERIA CULTURE: CPT | Performed by: EMERGENCY MEDICINE

## 2021-01-01 PROCEDURE — 25010000002 CEFTRIAXONE PER 250 MG: Performed by: EMERGENCY MEDICINE

## 2021-01-01 PROCEDURE — 85025 COMPLETE CBC W/AUTO DIFF WBC: CPT | Performed by: EMERGENCY MEDICINE

## 2021-01-01 PROCEDURE — 99284 EMERGENCY DEPT VISIT MOD MDM: CPT

## 2021-01-01 PROCEDURE — 82140 ASSAY OF AMMONIA: CPT | Performed by: EMERGENCY MEDICINE

## 2021-01-01 PROCEDURE — 93005 ELECTROCARDIOGRAM TRACING: CPT | Performed by: EMERGENCY MEDICINE

## 2021-01-01 PROCEDURE — 71045 X-RAY EXAM CHEST 1 VIEW: CPT

## 2021-01-01 PROCEDURE — 36415 COLL VENOUS BLD VENIPUNCTURE: CPT

## 2021-01-01 PROCEDURE — 87635 SARS-COV-2 COVID-19 AMP PRB: CPT | Performed by: EMERGENCY MEDICINE

## 2021-01-01 PROCEDURE — 83036 HEMOGLOBIN GLYCOSYLATED A1C: CPT | Performed by: NURSE PRACTITIONER

## 2021-01-01 PROCEDURE — 70450 CT HEAD/BRAIN W/O DYE: CPT

## 2021-01-01 PROCEDURE — 87150 DNA/RNA AMPLIFIED PROBE: CPT | Performed by: EMERGENCY MEDICINE

## 2021-01-01 PROCEDURE — 72125 CT NECK SPINE W/O DYE: CPT

## 2021-01-01 PROCEDURE — 84484 ASSAY OF TROPONIN QUANT: CPT | Performed by: NURSE PRACTITIONER

## 2021-01-01 PROCEDURE — 80053 COMPREHEN METABOLIC PANEL: CPT | Performed by: EMERGENCY MEDICINE

## 2021-01-01 PROCEDURE — 87040 BLOOD CULTURE FOR BACTERIA: CPT | Performed by: EMERGENCY MEDICINE

## 2021-01-01 PROCEDURE — 99223 1ST HOSP IP/OBS HIGH 75: CPT | Performed by: NURSE PRACTITIONER

## 2021-01-01 PROCEDURE — P9612 CATHETERIZE FOR URINE SPEC: HCPCS

## 2021-01-01 PROCEDURE — 80164 ASSAY DIPROPYLACETIC ACD TOT: CPT | Performed by: EMERGENCY MEDICINE

## 2021-01-01 PROCEDURE — 87186 SC STD MICRODIL/AGAR DIL: CPT | Performed by: EMERGENCY MEDICINE

## 2021-01-01 PROCEDURE — 87086 URINE CULTURE/COLONY COUNT: CPT | Performed by: EMERGENCY MEDICINE

## 2021-01-01 RX ORDER — ONDANSETRON 2 MG/ML
4 INJECTION INTRAMUSCULAR; INTRAVENOUS EVERY 6 HOURS PRN
Status: DISCONTINUED | OUTPATIENT
Start: 2021-01-01 | End: 2021-01-09 | Stop reason: HOSPADM

## 2021-01-01 RX ORDER — SODIUM CHLORIDE 0.9 % (FLUSH) 0.9 %
10 SYRINGE (ML) INJECTION EVERY 12 HOURS SCHEDULED
Status: DISCONTINUED | OUTPATIENT
Start: 2021-01-01 | End: 2021-01-09 | Stop reason: HOSPADM

## 2021-01-01 RX ORDER — ACETAMINOPHEN 160 MG/5ML
650 SOLUTION ORAL EVERY 4 HOURS PRN
Status: DISCONTINUED | OUTPATIENT
Start: 2021-01-01 | End: 2021-01-09 | Stop reason: HOSPADM

## 2021-01-01 RX ORDER — CHOLECALCIFEROL (VITAMIN D3) 125 MCG
10 CAPSULE ORAL NIGHTLY
COMMUNITY

## 2021-01-01 RX ORDER — ACETAMINOPHEN 325 MG/1
650 TABLET ORAL EVERY 4 HOURS PRN
COMMUNITY

## 2021-01-01 RX ORDER — PROMETHAZINE HYDROCHLORIDE 12.5 MG/1
12.5 TABLET ORAL EVERY 6 HOURS PRN
COMMUNITY

## 2021-01-01 RX ORDER — SODIUM CHLORIDE 0.9 % (FLUSH) 0.9 %
10 SYRINGE (ML) INJECTION AS NEEDED
Status: DISCONTINUED | OUTPATIENT
Start: 2021-01-01 | End: 2021-01-09 | Stop reason: HOSPADM

## 2021-01-01 RX ORDER — IBUPROFEN 800 MG/1
800 TABLET ORAL EVERY 8 HOURS PRN
COMMUNITY
End: 2021-02-01 | Stop reason: HOSPADM

## 2021-01-01 RX ORDER — LISINOPRIL 5 MG/1
5 TABLET ORAL DAILY
COMMUNITY
End: 2021-01-08 | Stop reason: HOSPADM

## 2021-01-01 RX ORDER — FLUPHENAZINE HYDROCHLORIDE 1 MG/1
1 TABLET ORAL 3 TIMES DAILY
COMMUNITY

## 2021-01-01 RX ORDER — QUETIAPINE FUMARATE 100 MG/1
100 TABLET, FILM COATED ORAL NIGHTLY
Status: ON HOLD | COMMUNITY
End: 2021-01-08 | Stop reason: SDUPTHER

## 2021-01-01 RX ORDER — MIRTAZAPINE 7.5 MG/1
7.5 TABLET, FILM COATED ORAL NIGHTLY
COMMUNITY

## 2021-01-01 RX ORDER — GLUCAGON HYDROCHLORIDE 1 MG
1 KIT INJECTION AS NEEDED
COMMUNITY

## 2021-01-01 RX ORDER — DIVALPROEX SODIUM 125 MG/1
125 TABLET, DELAYED RELEASE ORAL DAILY
COMMUNITY
End: 2021-02-01 | Stop reason: HOSPADM

## 2021-01-01 RX ORDER — DIVALPROEX SODIUM 500 MG/1
500 TABLET, DELAYED RELEASE ORAL NIGHTLY
COMMUNITY
End: 2021-02-01 | Stop reason: HOSPADM

## 2021-01-01 RX ORDER — ACETAMINOPHEN 325 MG/1
650 TABLET ORAL EVERY 4 HOURS PRN
Status: DISCONTINUED | OUTPATIENT
Start: 2021-01-01 | End: 2021-01-09 | Stop reason: HOSPADM

## 2021-01-01 RX ORDER — ACETAMINOPHEN 650 MG/1
650 SUPPOSITORY RECTAL EVERY 4 HOURS PRN
Status: DISCONTINUED | OUTPATIENT
Start: 2021-01-01 | End: 2021-01-09 | Stop reason: HOSPADM

## 2021-01-01 RX ORDER — INSULIN GLARGINE 100 [IU]/ML
20 INJECTION, SOLUTION SUBCUTANEOUS NIGHTLY
Status: ON HOLD | COMMUNITY
End: 2021-02-01 | Stop reason: SDUPTHER

## 2021-01-01 RX ORDER — SODIUM CHLORIDE 9 MG/ML
75 INJECTION, SOLUTION INTRAVENOUS CONTINUOUS
Status: DISCONTINUED | OUTPATIENT
Start: 2021-01-01 | End: 2021-01-09 | Stop reason: HOSPADM

## 2021-01-01 RX ORDER — ONDANSETRON 4 MG/1
4 TABLET, FILM COATED ORAL EVERY 6 HOURS PRN
Status: DISCONTINUED | OUTPATIENT
Start: 2021-01-01 | End: 2021-01-09 | Stop reason: HOSPADM

## 2021-01-01 RX ORDER — ATENOLOL 50 MG/1
50 TABLET ORAL DAILY
COMMUNITY

## 2021-01-01 RX ORDER — DIVALPROEX SODIUM 250 MG/1
250 TABLET, DELAYED RELEASE ORAL DAILY
COMMUNITY
End: 2021-02-01 | Stop reason: HOSPADM

## 2021-01-01 RX ORDER — MENTHOL 40 MG/ML
GEL TOPICAL 2 TIMES DAILY PRN
COMMUNITY
End: 2021-01-29

## 2021-01-01 RX ADMIN — ENOXAPARIN SODIUM 40 MG: 40 INJECTION SUBCUTANEOUS at 23:12

## 2021-01-01 RX ADMIN — SODIUM CHLORIDE 100 ML/HR: 900 INJECTION INTRAVENOUS at 23:04

## 2021-01-01 RX ADMIN — LIDOCAINE HYDROCHLORIDE 1 G: 10 INJECTION, SOLUTION EPIDURAL; INFILTRATION; INTRACAUDAL; PERINEURAL at 20:35

## 2021-01-01 RX ADMIN — Medication 10 ML: at 23:04

## 2021-01-01 NOTE — ED PROVIDER NOTES
Subjective   Chief complaint fall from nursing home    History of present illness 65-year-old male who is on multiple medication with several health problems who reportedly fell at the nursing home unwitnessed found on the floor unsure when he fell.  The patient was sent to the ER for evaluation.  The patient reports that he thinks he just tripped and fell just recently.  Did hit his head has some head and neck pain.  He denied any loss of consciousness.  He denies any chest or abdominal pain fever chills or change in medications or taking blood thinners.  Symptoms he states are mild nothing really makes it better or worse is ongoing for last few hours he feels like.  Nursing home reports some general weakness and frequent falls.  Patient denies any recent illness no recent cough congestion fever chills no vomiting or diarrhea.          Review of Systems   Constitutional: Negative for chills and fever.   Eyes: Negative for photophobia and visual disturbance.   Respiratory: Negative for chest tightness and shortness of breath.    Cardiovascular: Negative for chest pain and leg swelling.   Gastrointestinal: Negative for abdominal pain and vomiting.   Genitourinary: Negative for difficulty urinating and dysuria.   Musculoskeletal: Positive for neck pain. Negative for arthralgias and back pain.   Skin: Negative for color change and pallor.   Neurological: Positive for headaches. Negative for speech difficulty.   Psychiatric/Behavioral: Negative for agitation and behavioral problems.       Past Medical History:   Diagnosis Date   • Antisocial personality disorder (CMS/HCC)    • Bipolar disorder (CMS/HCC)    • Chronic kidney disease    • Convulsions (CMS/HCC)    • Diabetes mellitus (CMS/HCC)    • Hypertension    • Insomnia    • Renal disorder    • Schizophrenia (CMS/HCC)        No Known Allergies    No past surgical history on file.    No family history on file.    Social History     Socioeconomic History   • Marital  status:      Spouse name: Not on file   • Number of children: Not on file   • Years of education: Not on file   • Highest education level: Not on file   Tobacco Use   • Smoking status: Current Some Day Smoker     Packs/day: 0.50   • Smokeless tobacco: Never Used   Substance and Sexual Activity   • Alcohol use: Never     Frequency: Never   • Drug use: Never   • Sexual activity: Defer     Prior to Admission medications    Not on File     Medications atenolol Depakote fluphenazine insulin melatonin mirtazapine vitamins Phenergan Seroquel      Objective   Physical Exam  65-year-old awake alert but no distress HEENT extraocular muscle intact pupils equal round reactive there is no raccoon or rodgers sign.  No oral trauma no facial or mandible tenderness noted some mild cervical spine tenderness but no step-off or deformity no thoracic lumbar spine tenderness noted trachea midline no JVD no bruits chest wall nontender good breath sounds bilaterally heart regular without murmur abdomen soft without tenderness no bruising pelvis is stable there is no pain to the hips no shortening rotation arms or legs full range of motion no deformities or pain he is awake alert patient knows the year he knows where he is at he knows his name.  He was unsure of the month.  There is no drift the arms or legs there is no weakness strength are normal toes are downgoing.  Follows commands no facial asymmetry normal speech tongue and soft palate normal shoulder shrug normal  Procedures           ED Course      Results for orders placed or performed during the hospital encounter of 01/01/21   COVID-19, [facility] IN-HOUSE,NASAL Swab (NO TRANSPORT MEDIA) 2 HR TAT - Swab, Nasopharynx    Specimen: Nasopharynx; Swab   Result Value Ref Range    COVID19 Presumptive Negative Presumptive Negative - Ref. Range   Comprehensive Metabolic Panel    Specimen: Blood   Result Value Ref Range    Glucose 115 (H) 65 - 99 mg/dL    BUN 35 (H) 8 - 23 mg/dL     Creatinine 2.24 (H) 0.76 - 1.27 mg/dL    Sodium 133 (L) 136 - 145 mmol/L    Potassium 4.7 3.5 - 5.2 mmol/L    Chloride 99 98 - 107 mmol/L    CO2 24.0 22.0 - 29.0 mmol/L    Calcium 8.9 8.6 - 10.5 mg/dL    Total Protein 6.8 6.0 - 8.5 g/dL    Albumin 3.10 (L) 3.50 - 5.20 g/dL    ALT (SGPT) 12 1 - 41 U/L    AST (SGOT) 22 1 - 40 U/L    Alkaline Phosphatase 100 39 - 117 U/L    Total Bilirubin 0.4 0.0 - 1.2 mg/dL    eGFR Non African Amer 30 (L) >60 mL/min/1.73    Globulin 3.7 gm/dL    A/G Ratio 0.8 g/dL    BUN/Creatinine Ratio 15.6 7.0 - 25.0    Anion Gap 10.0 5.0 - 15.0 mmol/L   CK    Specimen: Blood   Result Value Ref Range    Creatine Kinase 92 20 - 200 U/L   Urinalysis With Culture If Indicated - Urine, Catheter    Specimen: Urine, Catheter   Result Value Ref Range    Color, UA Yellow Yellow, Straw    Appearance, UA Turbid (A) Clear    pH, UA 6.0 5.0 - 8.0    Specific Gravity, UA 1.014 1.005 - 1.030    Glucose, UA Negative Negative    Ketones, UA Trace (A) Negative    Bilirubin, UA Negative Negative    Blood, UA Large (3+) (A) Negative    Protein, UA >=300 mg/dL (3+) (A) Negative    Leuk Esterase, UA Moderate (2+) (A) Negative    Nitrite, UA Positive (A) Negative    Urobilinogen, UA 0.2 E.U./dL 0.2 - 1.0 E.U./dL   Valproic Acid Level, Total    Specimen: Blood   Result Value Ref Range    Valproic Acid 50.2 50.0 - 125.0 mcg/mL   Ammonia    Specimen: Blood   Result Value Ref Range    Ammonia 28 16 - 60 umol/L   CBC Auto Differential    Specimen: Blood   Result Value Ref Range    WBC 15.30 (H) 3.40 - 10.80 10*3/mm3    RBC 3.51 (L) 4.14 - 5.80 10*6/mm3    Hemoglobin 10.4 (L) 13.0 - 17.7 g/dL    Hematocrit 31.3 (L) 37.5 - 51.0 %    MCV 89.1 79.0 - 97.0 fL    MCH 29.6 26.6 - 33.0 pg    MCHC 33.2 31.5 - 35.7 g/dL    RDW 16.2 (H) 12.3 - 15.4 %    RDW-SD 50.3 37.0 - 54.0 fl    MPV 7.5 6.0 - 12.0 fL    Platelets 162 140 - 450 10*3/mm3   Scan Slide    Specimen: Blood   Result Value Ref Range    Scan Slide     Manual Differential     Specimen: Blood   Result Value Ref Range    Neutrophil % 44.0 42.7 - 76.0 %    Lymphocyte % 7.0 (L) 19.6 - 45.3 %    Monocyte % 17.0 (H) 5.0 - 12.0 %    Basophil % 1.0 0.0 - 1.5 %    Bands %  30.0 (H) 0.0 - 5.0 %    Metamyelocyte % 1.0 (H) 0.0 - 0.0 %    Neutrophils Absolute 11.32 (H) 1.70 - 7.00 10*3/mm3    Lymphocytes Absolute 1.07 0.70 - 3.10 10*3/mm3    Monocytes Absolute 2.60 (H) 0.10 - 0.90 10*3/mm3    Basophils Absolute 0.15 0.00 - 0.20 10*3/mm3    Anisocytosis Slight/1+ None Seen    WBC Morphology Normal Normal    Platelet Morphology Normal Normal   Urinalysis, Microscopic Only - Urine, Catheter    Specimen: Urine, Catheter   Result Value Ref Range    RBC, UA Too Numerous to Count (A) None Seen /HPF    WBC, UA Too Numerous to Count (A) None Seen /HPF    Bacteria, UA 2+ (A) None Seen /HPF    Squamous Epithelial Cells, UA None Seen None Seen, 0-2 /HPF    Hyaline Casts, UA 0-2 None Seen /LPF    Methodology Manual Light Microscopy    ECG 12 Lead   Result Value Ref Range    QT Interval 371 ms   Light Blue Top   Result Value Ref Range    Extra Tube hold for add-on    Gold Top - SST   Result Value Ref Range    Extra Tube Hold for add-ons.      Ct Head Without Contrast    Result Date: 1/1/2021   1. Motion artifact which limits the study, especially to the skull base. 2. No large parenchymal hemorrhage. 3. There appears to be volume loss secondary to atrophy.  Electronically Signed By-Erick Bautista MD On:1/1/2021 6:13 PM This report was finalized on [id number] by  Erick Bautista MD.    Ct Cervical Spine Without Contrast    Result Date: 1/1/2021   1. Limited study due to marked motion artifact at the C4-C6 level. It is difficult to exclude an acute fracture or dislocation in this area. 2. Degenerative spondylosis and facet arthritis. 3. Varying degrees of canal and neural foraminal stenosis which was better appreciated on the previous CT exam where no motion artifact was apparent.  Electronically Signed By-Erick  MD Lily On:1/1/2021 6:34 PM This report was finalized on [id number] by  Erick Bautista MD.    Xr Chest 1 View    Result Date: 1/1/2021  No active disease.  Electronically Signed By-Erick Bautista MD On:1/1/2021 5:59 PM This report was finalized on [id number] by  Erick Bautista MD.    Medications   cefTRIAXone (ROCEPHIN) 350 mg/ml in lidocaine 1% IM syringe (1 gm vial) (1 g Intramuscular Given 1/1/21 2035)          EKG my interpretation normal sinus rhythm rate of 87 some LVH some nonspecific elevation ST segments noted in his anterior leads last week T wave changes noted inferiorly but this is all unchanged from previous EKG abnormal                                  MDM  Number of Diagnoses or Management Options  Acute kidney injury (CMS/HCC):   Fall, initial encounter:   Urinary tract infection with hematuria, site unspecified:   Weakness:   Diagnosis management comments: Medical decision make.  Patient IV established placed on a monitor and had the above exam and evaluation.  Patient's chest x-ray was unremarkable CT head without without any acute findings CT cervical spine without obvious fracture but some motion artifact was noted.  Patient repeat exam denies neck pain he has no tenderness palpation on exam patient's white count was 15.3 hemoglobin 10.4 his chemistries reveal a BUN of 35 and a creatinine 2.24 this is up from his previous 1 in August of creatinine of 1.02.  Depakote level was 50 ammonia level was 28.  Patient had blood cultures obtained started on Rocephin 2 g IV.  Patient has low-grade temperature of 100 he is little tachypneic respiratory 25 he has white count of 15,000 has an acute kidney injury and a UTI.  The patient had cultures obtained and lactate added.  The patient was given Rocephin 2 g IV.  He was made aware of the findings.  The patient has been having some falls and weakness is white count 15,000 is got a bad urinary tract infection his abdominal exam is unremarkable.  He is not had  any other Covid symptoms but he is from a nursing home so a Covid test will be added for admission.  Patient examined appropriate PPE per hospital protocol.  Hospitalist nurse practitioner.  Stable otherwise unremarkable ER course       Amount and/or Complexity of Data Reviewed  Clinical lab tests: reviewed  Tests in the radiology section of CPT®: reviewed  Tests in the medicine section of CPT®: reviewed        Final diagnoses:   Fall, initial encounter   Weakness   Acute kidney injury (CMS/HCC)   Urinary tract infection with hematuria, site unspecified            Drake Baptiste MD  01/01/21 2035       Drake Baptiste MD  01/01/21 2107

## 2021-01-02 PROBLEM — N17.9 AKI (ACUTE KIDNEY INJURY) (HCC): Status: ACTIVE | Noted: 2021-01-02

## 2021-01-02 PROBLEM — Z79.899 POLYPHARMACY: Chronic | Status: ACTIVE | Noted: 2021-01-02

## 2021-01-02 PROBLEM — N18.32 STAGE 3B CHRONIC KIDNEY DISEASE (HCC): Chronic | Status: ACTIVE | Noted: 2021-01-02

## 2021-01-02 PROBLEM — A49.9 UTI (URINARY TRACT INFECTION), BACTERIAL: Status: ACTIVE | Noted: 2021-01-02

## 2021-01-02 PROBLEM — R78.81 E COLI BACTEREMIA: Status: ACTIVE | Noted: 2021-01-02

## 2021-01-02 PROBLEM — N39.0 UTI (URINARY TRACT INFECTION), BACTERIAL: Status: ACTIVE | Noted: 2021-01-02

## 2021-01-02 PROBLEM — B96.20 E COLI BACTEREMIA: Status: ACTIVE | Noted: 2021-01-02

## 2021-01-02 LAB
BACTERIA BLD CULT: ABNORMAL
BOTTLE TYPE: ABNORMAL
D-LACTATE SERPL-SCNC: 1.2 MMOL/L (ref 0.5–2)
GLUCOSE BLDC GLUCOMTR-MCNC: 100 MG/DL (ref 70–105)
GLUCOSE BLDC GLUCOMTR-MCNC: 107 MG/DL (ref 70–105)
GLUCOSE BLDC GLUCOMTR-MCNC: 135 MG/DL (ref 70–105)
GLUCOSE BLDC GLUCOMTR-MCNC: 137 MG/DL (ref 70–105)
GLUCOSE BLDC GLUCOMTR-MCNC: 176 MG/DL (ref 70–105)
HBA1C MFR BLD: 6.1 % (ref 3.5–5.6)
TROPONIN T SERPL-MCNC: 0.01 NG/ML (ref 0–0.03)

## 2021-01-02 PROCEDURE — 25010000002 CEFTRIAXONE PER 250 MG: Performed by: INTERNAL MEDICINE

## 2021-01-02 PROCEDURE — 99233 SBSQ HOSP IP/OBS HIGH 50: CPT | Performed by: INTERNAL MEDICINE

## 2021-01-02 PROCEDURE — 97162 PT EVAL MOD COMPLEX 30 MIN: CPT

## 2021-01-02 PROCEDURE — 82962 GLUCOSE BLOOD TEST: CPT

## 2021-01-02 PROCEDURE — 97530 THERAPEUTIC ACTIVITIES: CPT

## 2021-01-02 PROCEDURE — 25010000002 ENOXAPARIN PER 10 MG: Performed by: NURSE PRACTITIONER

## 2021-01-02 PROCEDURE — 83605 ASSAY OF LACTIC ACID: CPT | Performed by: NURSE PRACTITIONER

## 2021-01-02 PROCEDURE — 63710000001 INSULIN GLARGINE PER 5 UNITS: Performed by: NURSE PRACTITIONER

## 2021-01-02 RX ORDER — INSULIN LISPRO 100 [IU]/ML
0-7 INJECTION, SOLUTION INTRAVENOUS; SUBCUTANEOUS AS NEEDED
Status: DISCONTINUED | OUTPATIENT
Start: 2021-01-02 | End: 2021-01-09 | Stop reason: HOSPADM

## 2021-01-02 RX ORDER — ATENOLOL 50 MG/1
50 TABLET ORAL DAILY
Status: DISCONTINUED | OUTPATIENT
Start: 2021-01-02 | End: 2021-01-05

## 2021-01-02 RX ORDER — INSULIN GLARGINE 100 [IU]/ML
20 INJECTION, SOLUTION SUBCUTANEOUS NIGHTLY
Status: DISCONTINUED | OUTPATIENT
Start: 2021-01-02 | End: 2021-01-02

## 2021-01-02 RX ORDER — DEXTROSE MONOHYDRATE 25 G/50ML
25 INJECTION, SOLUTION INTRAVENOUS
Status: DISCONTINUED | OUTPATIENT
Start: 2021-01-02 | End: 2021-01-09 | Stop reason: HOSPADM

## 2021-01-02 RX ORDER — INSULIN LISPRO 100 [IU]/ML
0-7 INJECTION, SOLUTION INTRAVENOUS; SUBCUTANEOUS
Status: DISCONTINUED | OUTPATIENT
Start: 2021-01-02 | End: 2021-01-09 | Stop reason: HOSPADM

## 2021-01-02 RX ORDER — NICOTINE POLACRILEX 4 MG
15 LOZENGE BUCCAL
Status: DISCONTINUED | OUTPATIENT
Start: 2021-01-02 | End: 2021-01-09 | Stop reason: HOSPADM

## 2021-01-02 RX ORDER — POLYETHYLENE GLYCOL 3350 17 G/17G
17 POWDER, FOR SOLUTION ORAL 2 TIMES DAILY
Status: DISCONTINUED | OUTPATIENT
Start: 2021-01-02 | End: 2021-01-09 | Stop reason: HOSPADM

## 2021-01-02 RX ORDER — CHOLECALCIFEROL (VITAMIN D3) 125 MCG
10 CAPSULE ORAL NIGHTLY
Status: DISCONTINUED | OUTPATIENT
Start: 2021-01-02 | End: 2021-01-09 | Stop reason: HOSPADM

## 2021-01-02 RX ORDER — DIVALPROEX SODIUM 500 MG/1
500 TABLET, DELAYED RELEASE ORAL NIGHTLY
Status: DISCONTINUED | OUTPATIENT
Start: 2021-01-02 | End: 2021-01-09 | Stop reason: HOSPADM

## 2021-01-02 RX ORDER — DIVALPROEX SODIUM 125 MG/1
125 TABLET, DELAYED RELEASE ORAL DAILY
Status: DISCONTINUED | OUTPATIENT
Start: 2021-01-02 | End: 2021-01-09 | Stop reason: HOSPADM

## 2021-01-02 RX ORDER — ATENOLOL 50 MG/1
50 TABLET ORAL ONCE
Status: COMPLETED | OUTPATIENT
Start: 2021-01-02 | End: 2021-01-02

## 2021-01-02 RX ORDER — QUETIAPINE FUMARATE 100 MG/1
100 TABLET, FILM COATED ORAL NIGHTLY
Status: DISCONTINUED | OUTPATIENT
Start: 2021-01-02 | End: 2021-01-03

## 2021-01-02 RX ORDER — DIVALPROEX SODIUM 250 MG/1
250 TABLET, DELAYED RELEASE ORAL DAILY
Status: DISCONTINUED | OUTPATIENT
Start: 2021-01-02 | End: 2021-01-09 | Stop reason: HOSPADM

## 2021-01-02 RX ORDER — INSULIN GLARGINE 100 [IU]/ML
18 INJECTION, SOLUTION SUBCUTANEOUS NIGHTLY
Status: DISCONTINUED | OUTPATIENT
Start: 2021-01-02 | End: 2021-01-09 | Stop reason: HOSPADM

## 2021-01-02 RX ORDER — MIRTAZAPINE 7.5 MG/1
7.5 TABLET, FILM COATED ORAL NIGHTLY
Status: DISCONTINUED | OUTPATIENT
Start: 2021-01-02 | End: 2021-01-09 | Stop reason: HOSPADM

## 2021-01-02 RX ADMIN — ACETAMINOPHEN 650 MG: 325 TABLET, FILM COATED ORAL at 20:21

## 2021-01-02 RX ADMIN — ACETAMINOPHEN 650 MG: 325 TABLET, FILM COATED ORAL at 01:13

## 2021-01-02 RX ADMIN — INSULIN GLARGINE 18 UNITS: 100 INJECTION, SOLUTION SUBCUTANEOUS at 01:13

## 2021-01-02 RX ADMIN — DIVALPROEX SODIUM 500 MG: 500 TABLET, DELAYED RELEASE ORAL at 01:00

## 2021-01-02 RX ADMIN — DIVALPROEX SODIUM 500 MG: 500 TABLET, DELAYED RELEASE ORAL at 20:22

## 2021-01-02 RX ADMIN — QUETIAPINE FUMARATE 100 MG: 100 TABLET ORAL at 00:59

## 2021-01-02 RX ADMIN — POLYETHYLENE GLYCOL 3350 17 G: 17 POWDER, FOR SOLUTION ORAL at 20:22

## 2021-01-02 RX ADMIN — DIVALPROEX SODIUM 250 MG: 250 TABLET, DELAYED RELEASE ORAL at 10:34

## 2021-01-02 RX ADMIN — ATENOLOL 50 MG: 50 TABLET ORAL at 10:35

## 2021-01-02 RX ADMIN — SODIUM CHLORIDE 1000 ML: 9 INJECTION, SOLUTION INTRAVENOUS at 02:17

## 2021-01-02 RX ADMIN — POLYETHYLENE GLYCOL 3350 17 G: 17 POWDER, FOR SOLUTION ORAL at 10:34

## 2021-01-02 RX ADMIN — MIRTAZAPINE 7.5 MG: 7.5 TABLET ORAL at 00:59

## 2021-01-02 RX ADMIN — Medication 10 ML: at 10:35

## 2021-01-02 RX ADMIN — Medication 10 MG: at 20:21

## 2021-01-02 RX ADMIN — CEFTRIAXONE SODIUM 2 G: 2 INJECTION, POWDER, FOR SOLUTION INTRAMUSCULAR; INTRAVENOUS at 20:21

## 2021-01-02 RX ADMIN — Medication 10 MG: at 00:59

## 2021-01-02 RX ADMIN — Medication 10 ML: at 20:21

## 2021-01-02 RX ADMIN — ENOXAPARIN SODIUM 40 MG: 40 INJECTION SUBCUTANEOUS at 15:17

## 2021-01-02 RX ADMIN — INSULIN GLARGINE 18 UNITS: 100 INJECTION, SOLUTION SUBCUTANEOUS at 20:25

## 2021-01-02 RX ADMIN — MIRTAZAPINE 7.5 MG: 7.5 TABLET ORAL at 20:22

## 2021-01-02 RX ADMIN — QUETIAPINE FUMARATE 100 MG: 100 TABLET ORAL at 20:25

## 2021-01-02 RX ADMIN — ATENOLOL 50 MG: 50 TABLET ORAL at 01:13

## 2021-01-02 RX ADMIN — SODIUM CHLORIDE 125 ML/HR: 900 INJECTION INTRAVENOUS at 20:21

## 2021-01-02 RX ADMIN — DIVALPROEX SODIUM 125 MG: 125 TABLET, DELAYED RELEASE ORAL at 15:17

## 2021-01-02 RX ADMIN — SODIUM CHLORIDE 125 ML/HR: 900 INJECTION INTRAVENOUS at 12:22

## 2021-01-02 RX ADMIN — SODIUM CHLORIDE 125 ML/HR: 900 INJECTION INTRAVENOUS at 20:25

## 2021-01-02 NOTE — PLAN OF CARE
Goal Outcome Evaluation:  Plan of Care Reviewed With: patient     Outcome Summary: Pt admitted for fall and UTI. Pt confused and very restless at this time as pt has pulled out several IV's. Currently monitoring vital signs as BP was elevated when arrived to floor and febrile. Tylenol given for fever, IVF fluids running with a 1000mL bolus. Pt currently has a sitter at bedside d/t confusion, and pulling out lines. Will continue to monitor.

## 2021-01-02 NOTE — H&P
[facility] Medicine Services      Patient Name: Ronald Reyer  : 1955  MRN: [id number]  Primary Care Physician: Provider, No Known  Date of admission: 2021    Patient Care Team:  Provider, No Known as PCP - Vincent Yao MD as Consulting Physician (Hospitalist)          Subjective   History Present Illness     Chief Complaint:   Chief Complaint   Patient presents with   • Fall         Mr. Reyer is a 65 y.o.  presents to [facility] complaining of fall.          65-year-old male presents the ER with a chief complaint of left elbow pain after being found on the floor at the [facility] today.  The patient reports he does not remember falling.  He states he probably just tripped.  The patient told me that he has only been at the [facility] for approximately a week.  However, with review of records it looks that the patient is a long-term [facility] resident.      Review of Systems   Unable to perform ROS: dementia   Musculoskeletal: Positive for falls and joint pain.         left elbow pain.   All other systems reviewed and are negative.          Personal History     Past Medical History:   Past Medical History:   Diagnosis Date   • Antisocial personality disorder (CMS/HCC)    • Bipolar disorder (CMS/HCC)    • Chronic kidney disease    • Convulsions (CMS/HCC)    • Diabetes mellitus (CMS/HCC)    • Hypertension    • Insomnia    • Renal disorder    • Schizophrenia (CMS/HCC)        Surgical History:    History reviewed. No pertinent surgical history.        Family History: family history includes No Known Problems in his father and mother. Otherwise pertinent FHx was reviewed and unremarkable.     Social History:  reports that he has been smoking cigarettes. He has been smoking about 0.50 packs per day. He has never used smokeless tobacco. He reports that he does not drink alcohol or use drugs.      Medications:  Prior to Admission medications    Medication Sig Start Date End Date Taking?  Authorizing Provider   acetaminophen (TYLENOL) 325 MG tablet Take 650 mg by mouth Every 4 (Four) Hours As Needed for Mild Pain  or Fever.   Yes Jesus Bowser MD   atenolol (TENORMIN) 50 MG tablet Take 50 mg by mouth Daily.   Yes Jesus Bowser MD   divalproex (DEPAKOTE) 125 MG DR tablet Take 125 mg by mouth Daily.   Yes Jesus Bowser MD   divalproex (DEPAKOTE) 250 MG DR tablet Take 250 mg by mouth Daily.   Yes Jesus Bowser MD   divalproex (DEPAKOTE) 500 MG DR tablet Take 500 mg by mouth Every Night.   Yes Jesus Bowser MD   fluPHENAZine (PROLIXIN) 1 MG tablet Take 1 mg by mouth 3 (Three) Times a Day.   Yes Jesus Bowser MD   glucagon (GlucaGen HypoKit) 1 MG injection Infuse 1 mg into a venous catheter 2 (Two) Times a Day As Needed for Low Blood Sugar.   Yes Jesus Bowser MD   ibuprofen (ADVIL,MOTRIN) 800 MG tablet Take 800 mg by mouth Every 8 (Eight) Hours As Needed for Mild Pain .   Yes Jesus Bowser MD   insulin glargine (LANTUS, SEMGLEE) 100 UNIT/ML injection Inject 20 Units under the skin into the appropriate area as directed Every Night.   Yes Jesus Bowser MD   lisinopril (PRINIVIL,ZESTRIL) 5 MG tablet Take 5 mg by mouth Daily.   Yes Jesus Bowser MD   melatonin 5 MG tablet tablet Take 10 mg by mouth Every Night.   Yes Jesus Bowser MD   Menthol, Topical Analgesic, (Biofreeze) 4 % gel Apply  topically 2 (Two) Times a Day As Needed. BL Feet legs back and hips   Yes Jesus Bowser MD   mirtazapine (REMERON) 7.5 MG tablet Take 7.5 mg by mouth Every Night.   Yes Jesus Bowser MD   Prenatal Vit-Fe Fumarate-FA (M-Vit) tablet Take 1 tablet by mouth Daily.   Yes Jesus Bowser MD   promethazine (PHENERGAN) 12.5 MG tablet Take 12.5 mg by mouth Every 6 (Six) Hours As Needed for Nausea or Vomiting.   Yes Jesus Bowser MD   QUEtiapine (SEROquel) 100 MG tablet Take 100 mg by mouth Every Night.   Yes  Provider, Historical, MD       Allergies:  No Known Allergies    Objective   Objective     Vital Signs  Temp:  [98.5 °F (36.9 °C)-100 °F (37.8 °C)] 98.5 °F (36.9 °C)  Heart Rate:  [76-89] 76  Resp:  [16-25] 22  BP: (136-198)/() 180/91  SpO2:  [94 %-99 %] 94 %  on   ;   Device (Oxygen Therapy): room air  Body mass index is 25.74 kg/m².    Physical Exam  Vitals signs reviewed.   Constitutional:       Appearance: He is normal weight. He is ill-appearing. He is not toxic-appearing.   HENT:      Head: Normocephalic and atraumatic.      Right Ear: External ear normal.      Left Ear: External ear normal.      Nose: Nose normal. No congestion or rhinorrhea.      Mouth/Throat:      Mouth: Mucous membranes are moist.   Eyes:      General: No scleral icterus.        Right eye: No discharge.         Left eye: No discharge.      Extraocular Movements: Extraocular movements intact.      Conjunctiva/sclera: Conjunctivae normal.      Pupils: Pupils are equal, round, and reactive to light.   Neck:      Musculoskeletal: Normal range of motion and neck supple.   Cardiovascular:      Rate and Rhythm: Normal rate and regular rhythm.      Pulses: Normal pulses.      Heart sounds: No murmur.   Pulmonary:      Effort: Pulmonary effort is normal. No respiratory distress.      Breath sounds: Normal breath sounds.   Abdominal:      General: Bowel sounds are normal. There is no distension.      Palpations: Abdomen is soft.   Musculoskeletal: Normal range of motion.         General: No swelling or tenderness.      Right lower leg: No edema.      Left lower leg: No edema.   Skin:     General: Skin is warm and dry.      Capillary Refill: Capillary refill takes less than 2 seconds.      Coloration: Skin is not pale.   Neurological:      Mental Status: He is alert. Mental status is at baseline.      Cranial Nerves: No cranial nerve deficit.      Sensory: No sensory deficit.      Motor: No weakness.      Coordination: Coordination normal.      Psychiatric:      Comments: The patient has a history of mental health, Schizophrenia, seizure disorder, bipolar disorder         Results Review:  I have personally reviewed most recent cardiac tracings, lab results and radiology images and interpretations and agree with findings.    Results from last 7 days   Lab Units 01/01/21  1733   WBC 10*3/mm3 15.30*   HEMOGLOBIN g/dL 10.4*   HEMATOCRIT % 31.3*   PLATELETS 10*3/mm3 162     Results from last 7 days   Lab Units 01/01/21  2256 01/01/21  1733   SODIUM mmol/L  --  133*   POTASSIUM mmol/L  --  4.7   CHLORIDE mmol/L  --  99   CO2 mmol/L  --  24.0   BUN mg/dL  --  35*   CREATININE mg/dL  --  2.24*   GLUCOSE mg/dL  --  115*   CALCIUM mg/dL  --  8.9   ALT (SGPT) U/L  --  12   AST (SGOT) U/L  --  22   TROPONIN T ng/mL 0.015  --      Estimated Creatinine Clearance: 41.1 mL/min (A) (by C-G formula based on SCr of 2.24 mg/dL (H)).  Brief Urine Lab Results  (Last result in the past 365 days)      Color   Clarity   Blood   Leuk Est   Nitrite   Protein   CREAT   Urine HCG        01/01/21 1852 Yellow Turbid  Comment:  Result checked  Large (3+) Moderate (2+) Positive >=300 mg/dL (3+)               Microbiology Results (last 10 days)     Procedure Component Value - Date/Time    COVID-19, ABBOTT IN-HOUSE,NASAL Swab (NO TRANSPORT MEDIA) 2 HR TAT - Swab, Nasopharynx [[id number]]  (Normal) Collected: 01/01/21 2013    Lab Status: Final result Specimen: Swab from Nasopharynx Updated: 01/01/21 2037     COVID19 Presumptive Negative    Narrative:      Fact sheet for providers: https://www.fda.gov/media/436934/download     Fact sheet for patients: https://www.fda.gov/media/581838/download    Test performed by PCR.  If inconsistent with clinical signs and symptoms patient should be tested with different authorized molecular test.        I personally reviewed the EKG which shows mild ischemic changes in the inferior leads and peaked T waves in the anterior leads.  ECG/EMG Results (most  recent)     Procedure Component Value Units Date/Time    ECG 12 Lead [[id number]] Collected: 01/01/21 1728     Updated: 01/01/21 1729     QT Interval 371 ms     Narrative:      HEART RATE= 87  bpm  RR Interval= 692  ms  GA Interval= 120  ms  P Horizontal Axis= 5  deg  P Front Axis= 54  deg  QRSD Interval= 95  ms  QT Interval= 371  ms  QRS Axis= 23  deg  T Wave Axis= 76  deg  - ABNORMAL ECG -  Sinus rhythm  ST elevation, consider anterior injury  Electronically Signed By:   Date and Time of Study: 2021-01-01 17:28:09                    Ct Head Without Contrast    Result Date: 1/1/2021   1. Motion artifact which limits the study, especially to the skull base. 2. No large parenchymal hemorrhage. 3. There appears to be volume loss secondary to atrophy.  Electronically Signed By-Erick Bautista MD On:1/1/2021 6:13 PM This report was finalized on [id number] by  Erick Bautista MD.    Ct Cervical Spine Without Contrast    Result Date: 1/1/2021   1. Limited study due to marked motion artifact at the C4-C6 level. It is difficult to exclude an acute fracture or dislocation in this area. 2. Degenerative spondylosis and facet arthritis. 3. Varying degrees of canal and neural foraminal stenosis which was better appreciated on the previous CT exam where no motion artifact was apparent.  Electronically Signed By-rEick Bautista MD On:1/1/2021 6:34 PM This report was finalized on [id number] by  Erick Bautista MD.    Xr Chest 1 View    Result Date: 1/1/2021  No active disease.  Electronically Signed By-Erick Bautista MD On:1/1/2021 5:59 PM This report was finalized on [id number] by  Erick Bautista MD.     I personally reviewed the chest x-ray and did not identify any lung infiltrate, normal cardiac silhouette; the body is tilted to the right side with mild rotation.        Estimated Creatinine Clearance: 41.1 mL/min (A) (by C-G formula based on SCr of 2.24 mg/dL (H)).    Assessment/Plan   Assessment/Plan       Active Hospital Problems     Diagnosis  POA   • Fall [W19.XXXA]  Yes     Priority: High      Resolved Hospital Problems   No resolved problems to display.       Fall, history of schizophrenia, bipolar disorder, seizure disorder--patient cannot distinctly remember fall, he states he must have tripped: Fall precautions; physical therapy consult    Renal injury, acute, moderate, creatinine 2.24 superimposed on CKD with with comparison creatinine 1.06--likely secondary to volume deficit: IV fluids; repeat BMP; hold lisinopril    --ABGs viewed with noted mild acidosis, pH 7.331, mildly elevated pCO2 at 52.2    Metabolic encephalopathy--likely multifactorial with acute renal injury and UTI superimposed on mental illness with resulting confusion: Fall precautions; Sitter at bedside    Leukocytosis, moderate, WBC 15.3--secondary to UTI: Repeat CBC    Anemia, mild, hemoglobin 10.4, chronic: Repeat CBC    Hyponatremia, Sodium 133: IV fluids; repeat BMP    UTI: Continue IV Rocephin     Seizure disorder, chronic: Continue Depakote    Bipolar disorder, chronic: Hold prolactin; continue Seroquel; continue Remeron    Diabetes type 2, chronic: Continue Lantus with dose decreased by 20%; add sliding scale; check hemoglobin A1c    Insomnia, chronic: Continue Remeron; continue Seroquel; continue melatonin    Hypertension, chronic: Hold lisinopril secondary to renal injury; continue metoprolol      VTE Prophylaxis -   Mechanical Order History:     None      Pharmalogical Order History:      Ordered     Dose Route Frequency Stop    01/01/21 2129  enoxaparin (LOVENOX) syringe 40 mg      40 mg SC Every 24 Hours --                CODE STATUS:    Code Status and Medical Interventions:   Ordered at: 01/01/21 2129     Code Status:    CPR     Medical Interventions (Level of Support Prior to Arrest):    Full       This patient has been examined wearing appropriate Personal Protective Equipment. 01/02/21      I discussed the patient's findings and my recommendations with  patient and nursing staff.      Signature: Electronically signed by EFRAIN Villegas, 01/02/21, 12:30 AM EST.      [facility] Hospitalist Team

## 2021-01-02 NOTE — ED NOTES
PT has pulled out x2 IV's refusing further care at this time. Long psych HX noted. Provider notified no new orders.     Dejuan Leiva RN  01/01/21 2006

## 2021-01-02 NOTE — THERAPY EVALUATION
Patient Name: Ronald Reyer  : 1955    MRN: [id number]                              Today's Date: 2021       Admit Date: 2021    Visit Dx:     ICD-10-CM ICD-9-CM   1. Fall, initial encounter  W19.XXXA E888.9   2. Weakness  R53.1 780.79   3. Acute kidney injury (CMS/HCC)  N17.9 584.9   4. Urinary tract infection with hematuria, site unspecified  N39.0 599.0    R31.9 599.70     Patient Active Problem List   Diagnosis   • Fall   • SRAVAN (acute kidney injury) (CMS/[facility])   • Stage 3b chronic kidney disease   • UTI (urinary tract infection), bacterial   • Polypharmacy   • E coli bacteremia     Past Medical History:   Diagnosis Date   • Antisocial personality disorder (CMS/[facility])    • Bipolar disorder (CMS/[facility])    • Chronic kidney disease    • Convulsions (CMS/[facility])    • Diabetes mellitus (CMS/[facility])    • Hypertension    • Insomnia    • Renal disorder    • Schizophrenia (CMS/[facility])      History reviewed. No pertinent surgical history.  General Information     [facility] Name 21 1419          Physical Therapy Time and Intention    Document Type  evaluation  -     Mode of Treatment  physical therapy  -     Row Name 21 1419          General Information    Patient Profile Reviewed  yes  -     Prior Level of Function  min assist:;grooming;dressing;gait;bathing;transfer;ADL's Pt lives in LTC. Pt has assist with ADL's and mobility.  -     Row Name 21 1419          Living Environment    Lives With  facility resident  -     Row Name 21 1419          Cognition    Orientation Status (Cognition)  disoriented to;person;place;situation  -     Row Name 21 1419          Safety Issues, Functional Mobility    Impairments Affecting Function (Mobility)  balance;cognition;coordination;endurance/activity tolerance;strength  -     Cognitive Impairments, Mobility Safety/Performance  attention;insight into deficits/self-awareness;judgment;problem-solving/reasoning;safety precaution awareness  -       User  Key  (r) = Recorded By, (t) = Taken By, (c) = Cosigned By    Initials Name Provider Type    Eloisa Lei PT Physical Therapist        Mobility     Row Name 01/02/21 1421          Bed Mobility    Bed Mobility  rolling right;supine-sit  -WC     Rolling Left [place] (Bed Mobility)  moderate assist (50% patient effort)  -WC     Rolling Right [place] (Bed Mobility)  moderate assist (50% patient effort)  -     Supine-Sit [place] (Bed Mobility)  moderate assist (50% patient effort)  -     Assistive Device (Bed Mobility)  bed rails;draw sheet  -     Row Name 01/02/21 1421          Bed-Chair Transfer    Bed-Chair [place] (Transfers)  minimum assist (75% patient effort);2 person assist  -     Assistive Device (Bed-Chair Transfers)  walker, front-wheeled  -     Row Name 01/02/21 1421          Sit-Stand Transfer    Sit-Stand [place] (Transfers)  minimum assist (75% patient effort);2 person assist  -     Assistive Device (Sit-Stand Transfers)  walker, front-wheeled  -     Row Name 01/02/21 1421          Gait/Stairs (Locomotion)    [place] Level (Gait)  contact guard  -     Assistive Device (Gait)  walker, front-wheeled  -     Distance in Feet (Gait)  4 x 2  -WC     Deviations/Abnormal Patterns (Gait)  festinating/shuffling;gait speed decreased;weight shifting decreased  -       User Key  (r) = Recorded By, (t) = Taken By, (c) = Cosigned By    Initials Name Provider Type    Eloisa Lei PT Physical Therapist        Obj/Interventions     Row Name 01/02/21 1422          Range of Motion Comprehensive    General Range of Motion  bilateral lower extremity ROM WFL  -     Row Name 01/02/21 1422          Strength Comprehensive (MMT)    Comment, General Manual Muscle Testing (MMT) Assessment  4+/5 grossly ERIC LE's  -     Row Name 01/02/21 1422          Balance    Balance Assessment  sitting static balance;sitting dynamic balance;standing static balance;standing dynamic  balance  -     Static Sitting Balance  WFL;mild impairment  -     Dynamic Sitting Balance  mild impairment  -     Static Standing Balance  mild impairment  -     Dynamic Standing Balance  moderate impairment  -     Balance Interventions  sitting;standing;sit to stand;supported;static;dynamic;minimal challenge  -       User Key  (r) = Recorded By, (t) = Taken By, (c) = Cosigned By    Initials Name Provider Type     Eloisa Nava, PT Physical Therapist        Goals/Plan     Row Name 01/02/21 1423          Bed Mobility Goal 1 (PT)    Activity/Assistive Device (Bed Mobility Goal 1, PT)  rolling to left;rolling to right;sit to supine;scooting;supine to sit  -     [place] Level/Cues Needed (Bed Mobility Goal 1, PT)  minimum assist (75% or more patient effort)  -     Time Frame (Bed Mobility Goal 1, PT)  short term goal (STG);2 weeks  -     Row Name 01/02/21 1423          Transfer Goal 1 (PT)    Activity/Assistive Device (Transfer Goal 1, PT)  transfers, all;sit-to-stand/stand-to-sit;bed-to-chair/chair-to-bed;toilet  -     [place] Level/Cues Needed (Transfer Goal 1, PT)  standby assist  -     Time Frame (Transfer Goal 1, PT)  short term goal (STG);2 weeks  -     Row Name 01/02/21 1423          Gait Training Goal 1 (PT)    Activity/Assistive Device (Gait Training Goal 1, PT)  gait (walking locomotion);increase endurance/gait distance;walker, rolling;backward stepping;forward stepping;diminish gait deviation  -     [place] Level (Gait Training Goal 1, PT)  standby assist  -     Time Frame (Gait Training Goal 1, PT)  short term goal (STG);2 weeks  -     Row Name 01/02/21 1423          Patient Education Goal (PT)    Activity (Patient Education Goal, PT)  seated LE exercises  -     [place]/Cues/Accuracy (Memory Goal 2, PT)  demonstrates adequately;verbalizes understanding  -     Time Frame (Patient Education Goal, PT)  short term goal (STG);2 weeks  -       User Key  (r)  = Recorded By, (t) = Taken By, (c) = Cosigned By    Initials Name Provider Type    Eloisa Lei, URIEL Physical Therapist        Clinical Impression     Row Name 01/02/21 1422          Pain    Additional Documentation  Pain Scale: FACES Pre/Post-Treatment (Group)  -     Row Name 01/02/21 1422          Pain Scale: FACES Pre/Post-Treatment    Pain: FACES Scale, Pretreatment  2-->hurts little bit  -WC     Posttreatment Pain Rating  2-->hurts little bit  -     Pain Location - Orientation  generalized  -     Row Name 01/02/21 1422          Plan of Care Review    Plan of Care Reviewed With  patient  -     Row Name 01/02/21 1422          Therapy Assessment/Plan (PT)    Rehab Potential (PT)  fair, will monitor progress closely  -     Criteria for Skilled Interventions Met (PT)  yes  -     Predicted Duration of Therapy Intervention (PT)  Until D/C  -     Row Name 01/02/21 1422          Vital Signs    Pre Patient Position  Supine  -     Intra Patient Position  Standing  -WC     Post Patient Position  Supine  -     Row Name 01/02/21 1422          Positioning and Restraints    Pre-Treatment Position  in bed  -     Post Treatment Position  bed  -       User Key  (r) = Recorded By, (t) = Taken By, (c) = Cosigned By    Initials Name Provider Type    Eloisa Lei, PT Physical Therapist        Outcome Measures     Row Name 01/02/21 1425          How much help from another person do you currently need...    Turning from your back to your side while in flat bed without using bedrails?  2  -WC     Moving from lying on back to sitting on the side of a flat bed without bedrails?  2  -WC     Moving to and from a bed to a chair (including a wheelchair)?  3  -WC     Standing up from a chair using your arms (e.g., wheelchair, bedside chair)?  3  -WC     Climbing 3-5 steps with a railing?  2  -WC     To walk in hospital room?  3  -WC     AM-PAC 6 Clicks Score (PT)  15  -     Row Name 01/02/21 1422           Modified Squires Scale    Modified Squires Scale  4 - Moderately severe disability.  Unable to walk without assistance, and unable to attend to own bodily needs without assistance.  -     Row Name 01/02/21 1425          Functional Assessment    Outcome Measure Options  AM-PAC 6 Clicks Basic Mobility (PT);Modified Helene  -       User Key  (r) = Recorded By, (t) = Taken By, (c) = Cosigned By    Initials Name Provider Type     Eloisa Nava PT Physical Therapist        Physical Therapy Education                 Title: PT OT SLP Therapies (Done)     Topic: Physical Therapy (Done)     Point: Mobility training (Done)     Learning Progress Summary           Patient Acceptance, E,TB, VU,NR,DU by  at 1/2/2021 1426                   Point: Home exercise program (Done)     Learning Progress Summary           Patient Acceptance, E,TB, VU,NR,DU by  at 1/2/2021 1426                   Point: Body mechanics (Done)     Learning Progress Summary           Patient Acceptance, E,TB, VU,NR,DU by  at 1/2/2021 1426                   Point: Precautions (Done)     Learning Progress Summary           Patient Acceptance, E,TB, VU,NR,DU by  at 1/2/2021 1426                               User Key     Initials Effective Dates Name Provider Type Discipline     01/07/20 -  Eloisa Nava PT Physical Therapist PT              PT Recommendation and Plan  Pt is a 64 yo male ADM to the hospital with weakness, SRAVAN after a fall, DX UTI. Pt lives in LTC. Prior level of function: Pt had MIN assist with ADL's and mobility. Today pt was disoriented to person, place and situation. Pt needed MOD A for bed mobility, CGA sit to stand with FWW, gait 4 feet to chair. Pt sat up briefly then requested to return to bed. Pt followed VC for seated LE exercises DF, PF 10 ea. Pt had increased rigidity, dyskinesia. Pt is below baseline.     Recommendation is D/C to IP Rehab. PPE: Mask, eyeshield, gloves.    Planned Therapy Interventions (PT): balance  training, bed mobility training, gait training, home exercise program, neuromuscular re-education, strengthening, transfer training  Plan of Care Reviewed With: patient     Time Calculation:   PT Charges     Row Name 01/02/21 1431             Time Calculation    Start Time  1315  -WC      Stop Time  1334  -WC      Time Calculation (min)  19 min  -WC      PT Received On  01/02/21  -WC      PT - Next Appointment  01/04/21  -WC      PT Goal Re-Cert Due Date  01/16/21  -WC         Time Calculation- PT    TCU Minutes- PT  15 min  -WC        User Key  (r) = Recorded By, (t) = Taken By, (c) = Cosigned By    Initials Name Provider Type     Eloisa Nava, PT Physical Therapist        Therapy Charges for Today     Code Description Service Date Service Provider Modifiers Qty    [id number]  PT EVAL MOD COMPLEXITY 3 1/2/2021 Eloisa Nava, PT GP 1    [id number]  PT THERAPEUTIC ACT EA 15 MIN 1/2/2021 Eloisa Nava, PT GP 1          PT G-Codes  Outcome Measure Options: AM-PAC 6 Clicks Basic Mobility (PT), Modified Helene  AM-PAC 6 Clicks Score (PT): 15  Modified Helene Scale: 4 - Moderately severe disability.  Unable to walk without assistance, and unable to attend to own bodily needs without assistance.    Eloisa Nava PT  1/2/2021

## 2021-01-02 NOTE — PROGRESS NOTES
[facility] Medicine Services  INPATIENT PROGRESS NOTE  362/1   Hospitalist Team  LOS 1 days      Patient Care Team:  Provider, No Known as PCP - Vincent Yao MD as Consulting Physician (Hospitalist)      Patient was examined with relevant and adequate PPE keeping in mind the current coronavirus pandemic. Minimum of 10 minutes to don and doff PPE.    Chief Complaint / Subjective  Chief Complaint   Patient presents with   • Fall       HPI (4 hpi elements or status of 3 chronic)     65-year-old male presents the ER with a chief complaint of left elbow pain after being found on the floor at the [facility] today.  The patient reports he does not remember falling.  He states he probably just tripped.  The patient told me that he has only been at the [facility] for approximately a week.  However, with review of records it looks that the patient is a long-term [facility] resident.    Patient unable to provide any history drowsy.           History taken from: chart    Review of Systems   Unable to perform ROS: dementia             Family History   Problem Relation Age of Onset   • No Known Problems Mother    • No Known Problems Father        Past Medical History:   Diagnosis Date   • Antisocial personality disorder (CMS/HCC)    • Bipolar disorder (CMS/HCC)    • Chronic kidney disease    • Convulsions (CMS/HCC)    • Diabetes mellitus (CMS/HCC)    • Hypertension    • Insomnia    • Renal disorder    • Schizophrenia (CMS/HCC)        Social History     Socioeconomic History   • Marital status:      Spouse name: Not on file   • Number of children: Not on file   • Years of education: Not on file   • Highest education level: Not on file   Tobacco Use   • Smoking status: Current Every Day Smoker     Packs/day: 0.50     Types: Cigarettes   • Smokeless tobacco: Never Used   Substance and Sexual Activity   • Alcohol use: Never     Frequency: Never   • Drug use: Never   • Sexual activity: Defer       Prior to Admission  medications    Medication Sig Start Date End Date Taking? Authorizing Provider   acetaminophen (TYLENOL) 325 MG tablet Take 650 mg by mouth Every 4 (Four) Hours As Needed for Mild Pain  or Fever.   Yes Jesus Bowser MD   atenolol (TENORMIN) 50 MG tablet Take 50 mg by mouth Daily.   Yes Jesus Bowser MD   divalproex (DEPAKOTE) 125 MG DR tablet Take 125 mg by mouth Daily.   Yes Jesus Bowser MD   divalproex (DEPAKOTE) 250 MG DR tablet Take 250 mg by mouth Daily.   Yes Jesus Bowser MD   divalproex (DEPAKOTE) 500 MG DR tablet Take 500 mg by mouth Every Night.   Yes Jesus Bowser MD   fluPHENAZine (PROLIXIN) 1 MG tablet Take 1 mg by mouth 3 (Three) Times a Day.   Yes Jesus Bowser MD   glucagon (GlucaGen HypoKit) 1 MG injection Infuse 1 mg into a venous catheter 2 (Two) Times a Day As Needed for Low Blood Sugar.   Yes Jesus Bowser MD   ibuprofen (ADVIL,MOTRIN) 800 MG tablet Take 800 mg by mouth Every 8 (Eight) Hours As Needed for Mild Pain .   Yes Jesus Bowser MD   insulin glargine (LANTUS, SEMGLEE) 100 UNIT/ML injection Inject 20 Units under the skin into the appropriate area as directed Every Night.   Yes Jesus Bowser MD   lisinopril (PRINIVIL,ZESTRIL) 5 MG tablet Take 5 mg by mouth Daily.   Yes Jesus Bowser MD   melatonin 5 MG tablet tablet Take 10 mg by mouth Every Night.   Yes Jesus Bowser MD   Menthol, Topical Analgesic, (Biofreeze) 4 % gel Apply  topically 2 (Two) Times a Day As Needed. BL Feet legs back and hips   Yes Jesus Bowser MD   mirtazapine (REMERON) 7.5 MG tablet Take 7.5 mg by mouth Every Night.   Yes Jesus Bowser MD   Prenatal Vit-Fe Fumarate-FA (M-Vit) tablet Take 1 tablet by mouth Daily.   Yes Jesus Bowser MD   promethazine (PHENERGAN) 12.5 MG tablet Take 12.5 mg by mouth Every 6 (Six) Hours As Needed for Nausea or Vomiting.   Yes Jesus Bowser MD   QUEtiapine (SEROquel) 100  "MG tablet Take 100 mg by mouth Every Night.   Yes Provider, MD Jesus        Objective    Physical Exam     Vital Signs  Temp:  [98.5 °F (36.9 °C)-102.7 °F (39.3 °C)] 99.6 °F (37.6 °C)  Heart Rate:  [] 72  Resp:  [16-25] 16  BP: ()/() 107/55    Oxygen Therapy  SpO2: 94 %  Pulse Oximetry Type: Intermittent  Device (Oxygen Therapy): room air  Flowsheet Rows      First Filed Value   Admission Height  182.9 cm (72\") Documented at 01/01/2021 1711   Admission Weight  90.3 kg (199 lb) Documented at 01/01/2021 1711        Weight change:    Intake & Output (last 3 days)       12/30 0701 - 12/31 0700 12/31 0701 - 01/01 0700 01/01 0701 - 01/02 0700 01/02 0701 - 01/03 0700    P.O.   240     IV Piggyback   1000     Total Intake(mL/kg)   1240 (14.4)     Net   +1240             Urine Unmeasured Occurrence    1 x        Lines, Drains & Airways    Active LDAs     Name:   Placement date:   Placement time:   Site:   Days:    Peripheral IV 01/02/21 0100 Anterior;Proximal;Right;Upper Arm   01/02/21    0100    Arm   less than 1                Physical Exam:    Physical Exam  Vitals signs and nursing note reviewed.   Constitutional:       General: He is not in acute distress.     Appearance: Normal appearance. He is well-developed. He is not ill-appearing, toxic-appearing or diaphoretic.   HENT:      Head: Normocephalic and atraumatic.      Right Ear: Ear canal and external ear normal.      Left Ear: Ear canal and external ear normal.      Nose: Nose normal. No congestion or rhinorrhea.      Mouth/Throat:      Mouth: Mucous membranes are moist.      Pharynx: No oropharyngeal exudate.   Eyes:      General: No scleral icterus.        Right eye: No discharge.         Left eye: No discharge.      Extraocular Movements: Extraocular movements intact.      Conjunctiva/sclera: Conjunctivae normal.      Pupils: Pupils are equal, round, and reactive to light.   Neck:      Musculoskeletal: Normal range of motion and neck " supple. No neck rigidity or muscular tenderness.      Thyroid: No thyromegaly.      Vascular: No carotid bruit or JVD.      Trachea: No tracheal deviation.   Cardiovascular:      Rate and Rhythm: Normal rate and regular rhythm.      Pulses: Normal pulses.      Heart sounds: Normal heart sounds. No murmur. No friction rub. No gallop.    Pulmonary:      Effort: Pulmonary effort is normal. No respiratory distress.      Breath sounds: No stridor. Wheezing present. No rhonchi or rales.   Chest:      Chest wall: No tenderness.   Abdominal:      General: Bowel sounds are normal. There is no distension.      Palpations: Abdomen is soft. There is no mass.      Tenderness: There is no abdominal tenderness. There is no guarding or rebound.      Hernia: No hernia is present.      Comments: Nonspecific discomfort on palpation   Musculoskeletal: Normal range of motion.         General: No swelling, tenderness, deformity or signs of injury.      Right lower leg: No edema.      Left lower leg: No edema.   Lymphadenopathy:      Cervical: No cervical adenopathy.   Skin:     General: Skin is warm and dry.      Coloration: Skin is not jaundiced or pale.      Findings: No bruising, erythema or rash.   Neurological:      General: No focal deficit present.      Mental Status: He is alert. Mental status is at baseline.      Cranial Nerves: No cranial nerve deficit.      Sensory: No sensory deficit.      Motor: No weakness or abnormal muscle tone.      Coordination: Coordination normal.      Comments: Drowsy               PT Recommendation and Plan             Procedures:    * No surgery found *     Assessment/Plan with Problem wise       Active Hospital Problems    Diagnosis  POA   • **UTI (urinary tract infection), bacterial [N39.0, A49.9]  Yes   • SRAVAN (acute kidney injury) (CMS/HCC) [N17.9]  Yes   • Stage 3b chronic kidney disease [N18.32]  Yes   • Fall [W19.XXXA]  Yes      Resolved Hospital Problems   No resolved problems to display.         Estimated Creatinine Clearance: 41.1 mL/min (A) (by C-G formula based on SCr of 2.24 mg/dL (H)).    Code Status and Medical Interventions:   Ordered at: 01/01/21 2129     Code Status:    CPR     Medical Interventions (Level of Support Prior to Arrest):    Full       MEDICAL DECISION MAKING COMPLEXITY BY PROBLEM:       UTI:  Cultures  Empiric antibiotics till cultures finalize  Remove Mclain when not needed  Screening urinalysis should not be done and is not recommended by any professional society or organization.  Does not achieve any purpose except antibiotic resistance and adverse effects like C. difficile colitis      Renal failure/insufficiency/injury:  Hydration  Supportive treatment  Cut down or hold ACE inhibitors  Avoid nephrotoxic medications   Address diuretics      Polypharmacy probably contributing to his current condition and constipation.  And impaired intake.    Care coordination with nursing 01/02/21 10:15 AM EST.    Plan for disposition:            Continued Care and Services - Admitted Since 1/1/2021    Coordination has not been started for this encounter.        Historical & Objective Data     Results Review:    I reviewed the patient's new lab and radiology results. 01/02/21     Results from last 7 days   Lab Units 01/01/21  1733   WBC 10*3/mm3 15.30*   HEMOGLOBIN g/dL 10.4*   HEMATOCRIT % 31.3*   MCV fL 89.1   MCH pg 29.6   MPV fL 7.5   RDW % 16.2*   PLATELETS 10*3/mm3 162   MONOCYTES % % 17.0*     Results from last 7 days   Lab Units 01/01/21  1733   SODIUM mmol/L 133*   POTASSIUM mmol/L 4.7   CHLORIDE mmol/L 99   CO2 mmol/L 24.0   BUN mg/dL 35*   CREATININE mg/dL 2.24*   CALCIUM mg/dL 8.9   BILIRUBIN mg/dL 0.4   ALK PHOS U/L 100   ALT (SGPT) U/L 12   AST (SGOT) U/L 22   GLUCOSE mg/dL 115*     Inflammatory Biomarkers        Invalid input(s): ESR, D-DIMER QUANTITATIVE,  PROCALCITONIN,    No results found for: PHOS  No results found for: HGBA1C  No results found for: CHOL, CHLPL, TRIG, HDL,  LDL, LDLDIRECT  No results found for: LIPASE            Pathology  No results found for: INTRAOP, PREDX, FINALDX, COMDX  COVID19   Date Value Ref Range Status   01/01/2021 Presumptive Negative Presumptive Negative - Ref. Range Final        Microbiology Results (last 10 days)     Procedure Component Value - Date/Time    COVID-19, [facility]BBOTT IN-HOUSE,NASAL Swab (NO TRANSPORT MEDIA) 2 HR TAT - Swab, Nasopharynx [[id number]]  (Normal) Collected: 01/01/21 2013    Lab Status: Final result Specimen: Swab from Nasopharynx Updated: 01/01/21 2037     COVID19 Presumptive Negative    Narrative:      Fact sheet for providers: https://www.fda.gov/media/867247/download     Fact sheet for patients: https://www.fda.gov/media/555424/download    Test performed by PCR.  If inconsistent with clinical signs and symptoms patient should be tested with different authorized molecular test.          ECG/EMG Results (most recent)     Procedure Component Value Units Date/Time    ECG 12 Lead [[id number]] Collected: 01/01/21 1728     Updated: 01/01/21 1729     QT Interval 371 ms     Narrative:      HEART RATE= 87  bpm  RR Interval= 692  ms  AK Interval= 120  ms  P Horizontal Axis= 5  deg  P Front Axis= 54  deg  QRSD Interval= 95  ms  QT Interval= 371  ms  QRS Axis= 23  deg  T Wave Axis= 76  deg  - ABNORMAL ECG -  Sinus rhythm  ST elevation, consider anterior injury  Electronically Signed By:   Date and Time of Study: 2021-01-01 17:28:09                    Ct Head Without Contrast    Result Date: 1/1/2021   1. Motion artifact which limits the study, especially to the skull base. 2. No large parenchymal hemorrhage. 3. There appears to be volume loss secondary to atrophy.  Electronically Signed By-Erick Bautista MD On:1/1/2021 6:13 PM This report was finalized on [id number] by  Erick Bautista MD.    Ct Cervical Spine Without Contrast    Result Date: 1/1/2021   1. Limited study due to marked motion artifact at the C4-C6 level. It is difficult to exclude an  acute fracture or dislocation in this area. 2. Degenerative spondylosis and facet arthritis. 3. Varying degrees of canal and neural foraminal stenosis which was better appreciated on the previous CT exam where no motion artifact was apparent.  Electronically Signed By-Erick Bautista MD On:1/1/2021 6:34 PM This report was finalized on [id number] by  Erick Bautista MD.    Xr Chest 1 View    Result Date: 1/1/2021  No active disease.  Electronically Signed By-Erick Bautista MD On:1/1/2021 5:59 PM This report was finalized on [id number] by  Erick Bautista MD.      I personally reviewed patient's x-ray films and my findings are: 0    I personally reviewed patient's EKG strip and my findings are: 0    Medication Review:   I have reviewed the patient's current medication list 01/02/21     Scheduled Meds  atenolol, 50 mg, Oral, Daily  cefTRIAXone, 1 g, Intravenous, Q24H  divalproex, 125 mg, Oral, Daily  divalproex, 250 mg, Oral, Daily  divalproex, 500 mg, Oral, Nightly  enoxaparin, 40 mg, Subcutaneous, Daily  insulin glargine, 18 Units, Subcutaneous, Nightly  insulin lispro, 0-7 Units, Subcutaneous, TID AC  melatonin, 10 mg, Oral, Nightly  mirtazapine, 7.5 mg, Oral, Nightly  polyethylene glycol, 17 g, Oral, BID  QUEtiapine, 100 mg, Oral, Nightly  sodium chloride, 10 mL, Intravenous, Q12H        Meds Infusions  sodium chloride, 125 mL/hr, Last Rate: 100 mL/hr (01/01/21 2304)        DVT prophylaxis  Mechanical Order History:     None      Pharmalogical Order History:      Ordered     Dose Route Frequency Stop    01/01/21 2129  enoxaparin (LOVENOX) syringe 40 mg      40 mg SC Daily --                Meds PRN  •  acetaminophen **OR** acetaminophen **OR** acetaminophen  •  dextrose  •  dextrose  •  glucagon (human recombinant)  •  insulin lispro **AND** insulin lispro  •  ondansetron **OR** ondansetron  •  sodium chloride      Davide Mills MD  01/02/21  10:15 EST

## 2021-01-02 NOTE — PLAN OF CARE
Goal Outcome Evaluation:  Plan of Care Reviewed With: patient    Patient has been asleep most of shift.  He is restless in bed.  Blood cultures positive for e coli.  Patient getting IV antibiotics.  Sitter at bedside.  Will continue to monitor.

## 2021-01-02 NOTE — PLAN OF CARE
Problem: Adult Inpatient Plan of Care  Goal: Plan of Care Review  Recent Flowsheet Documentation  Taken 1/2/2021 1422 by Eloisa Nava, URIEL  Plan of Care Reviewed With: patient   Goal Outcome Evaluation:  Plan of Care Reviewed With: patient      Pt is a 64 yo male ADM to the hospital with weakness, SRAVAN after a fall, DX UTI. Pt lives in LTC. Prior level of function: Pt had MIN assist with ADL's and mobility. Today pt was disoriented to person, place and situation. Pt needed MOD A for bed mobility, CGA sit to stand with FWW, gait 4 feet to chair. Pt sat up briefly then requested to return to bed. Pt followed VC for seated LE exercises DF, PF 10 ea. Pt had increased rigidity, dyskinesia. Pt is below baseline. Recommendation is D/C to IP Rehab. PPE: Mask, eyeshield, gloves.

## 2021-01-03 ENCOUNTER — APPOINTMENT (OUTPATIENT)
Dept: GENERAL RADIOLOGY | Facility: HOSPITAL | Age: 66
End: 2021-01-03

## 2021-01-03 PROBLEM — R06.00 DYSPNEA: Status: ACTIVE | Noted: 2021-01-03

## 2021-01-03 LAB
ALBUMIN SERPL-MCNC: 2.5 G/DL (ref 3.5–5.2)
ALBUMIN/GLOB SERPL: 0.6 G/DL
ALP SERPL-CCNC: 307 U/L (ref 39–117)
ALT SERPL W P-5'-P-CCNC: 22 U/L (ref 1–41)
ANION GAP SERPL CALCULATED.3IONS-SCNC: 9 MMOL/L (ref 5–15)
ANION GAP SERPL CALCULATED.3IONS-SCNC: 9 MMOL/L (ref 5–15)
ARTERIAL PATENCY WRIST A: POSITIVE
AST SERPL-CCNC: 37 U/L (ref 1–40)
ATMOSPHERIC PRESS: ABNORMAL MM[HG]
BACTERIA SPEC AEROBE CULT: ABNORMAL
BASE EXCESS BLDA CALC-SCNC: -3.7 MMOL/L (ref 0–3)
BASOPHILS # BLD AUTO: 0 10*3/MM3 (ref 0–0.2)
BASOPHILS # BLD AUTO: 0 10*3/MM3 (ref 0–0.2)
BASOPHILS NFR BLD AUTO: 0.1 % (ref 0–1.5)
BASOPHILS NFR BLD AUTO: 0.1 % (ref 0–1.5)
BDY SITE: ABNORMAL
BILIRUB SERPL-MCNC: 0.3 MG/DL (ref 0–1.2)
BUN SERPL-MCNC: 66 MG/DL (ref 8–23)
BUN SERPL-MCNC: 66 MG/DL (ref 8–23)
BUN/CREAT SERPL: 19.2 (ref 7–25)
BUN/CREAT SERPL: 20.2 (ref 7–25)
CA-I BLDA-SCNC: 0.99 MMOL/L (ref 1.15–1.33)
CALCIUM SPEC-SCNC: 8.4 MG/DL (ref 8.6–10.5)
CALCIUM SPEC-SCNC: 8.7 MG/DL (ref 8.6–10.5)
CHLORIDE SERPL-SCNC: 101 MMOL/L (ref 98–107)
CHLORIDE SERPL-SCNC: 99 MMOL/L (ref 98–107)
CO2 BLDA-SCNC: 22.5 MMOL/L (ref 22–29)
CO2 SERPL-SCNC: 21 MMOL/L (ref 22–29)
CO2 SERPL-SCNC: 24 MMOL/L (ref 22–29)
CREAT SERPL-MCNC: 3.26 MG/DL (ref 0.76–1.27)
CREAT SERPL-MCNC: 3.44 MG/DL (ref 0.76–1.27)
D-LACTATE SERPL-SCNC: 0.4 MMOL/L (ref 0.5–2)
DEPRECATED RDW RBC AUTO: 51.2 FL (ref 37–54)
DEPRECATED RDW RBC AUTO: 52.5 FL (ref 37–54)
EOSINOPHIL # BLD AUTO: 0 10*3/MM3 (ref 0–0.4)
EOSINOPHIL # BLD AUTO: 0 10*3/MM3 (ref 0–0.4)
EOSINOPHIL NFR BLD AUTO: 0.1 % (ref 0.3–6.2)
EOSINOPHIL NFR BLD AUTO: 0.1 % (ref 0.3–6.2)
ERYTHROCYTE [DISTWIDTH] IN BLOOD BY AUTOMATED COUNT: 16.4 % (ref 12.3–15.4)
ERYTHROCYTE [DISTWIDTH] IN BLOOD BY AUTOMATED COUNT: 16.4 % (ref 12.3–15.4)
GFR SERPL CREATININE-BSD FRML MDRD: 18 ML/MIN/1.73
GFR SERPL CREATININE-BSD FRML MDRD: 19 ML/MIN/1.73
GLOBULIN UR ELPH-MCNC: 4.3 GM/DL
GLUCOSE BLDC GLUCOMTR-MCNC: 103 MG/DL (ref 74–100)
GLUCOSE BLDC GLUCOMTR-MCNC: 103 MG/DL (ref 74–100)
GLUCOSE BLDC GLUCOMTR-MCNC: 104 MG/DL (ref 70–105)
GLUCOSE BLDC GLUCOMTR-MCNC: 127 MG/DL (ref 70–105)
GLUCOSE BLDC GLUCOMTR-MCNC: 94 MG/DL (ref 70–105)
GLUCOSE BLDC GLUCOMTR-MCNC: 95 MG/DL (ref 70–105)
GLUCOSE SERPL-MCNC: 107 MG/DL (ref 65–99)
GLUCOSE SERPL-MCNC: 145 MG/DL (ref 65–99)
HCO3 BLDA-SCNC: 21.3 MMOL/L (ref 21–28)
HCT VFR BLD AUTO: 27.7 % (ref 37.5–51)
HCT VFR BLD AUTO: 32 % (ref 37.5–51)
HCT VFR BLDA CALC: 30 % (ref 38–51)
HEMODILUTION: NO
HGB BLD-MCNC: 10.7 G/DL (ref 13–17.7)
HGB BLD-MCNC: 9.1 G/DL (ref 13–17.7)
HGB BLDA-MCNC: 10.4 G/DL (ref 12–17)
INHALED O2 CONCENTRATION: 32 %
LYMPHOCYTES # BLD AUTO: 0.4 10*3/MM3 (ref 0.7–3.1)
LYMPHOCYTES # BLD AUTO: 0.6 10*3/MM3 (ref 0.7–3.1)
LYMPHOCYTES NFR BLD AUTO: 1.9 % (ref 19.6–45.3)
LYMPHOCYTES NFR BLD AUTO: 3.7 % (ref 19.6–45.3)
MAGNESIUM SERPL-MCNC: 1.9 MG/DL (ref 1.6–2.4)
MCH RBC QN AUTO: 29.6 PG (ref 26.6–33)
MCH RBC QN AUTO: 30 PG (ref 26.6–33)
MCHC RBC AUTO-ENTMCNC: 32.7 G/DL (ref 31.5–35.7)
MCHC RBC AUTO-ENTMCNC: 33.4 G/DL (ref 31.5–35.7)
MCV RBC AUTO: 89.9 FL (ref 79–97)
MCV RBC AUTO: 90.5 FL (ref 79–97)
MODALITY: ABNORMAL
MONOCYTES # BLD AUTO: 0.8 10*3/MM3 (ref 0.1–0.9)
MONOCYTES # BLD AUTO: 0.9 10*3/MM3 (ref 0.1–0.9)
MONOCYTES NFR BLD AUTO: 4.4 % (ref 5–12)
MONOCYTES NFR BLD AUTO: 5.6 % (ref 5–12)
NEUTROPHILS NFR BLD AUTO: 15.2 10*3/MM3 (ref 1.7–7)
NEUTROPHILS NFR BLD AUTO: 17.3 10*3/MM3 (ref 1.7–7)
NEUTROPHILS NFR BLD AUTO: 90.5 % (ref 42.7–76)
NEUTROPHILS NFR BLD AUTO: 93.5 % (ref 42.7–76)
NRBC BLD AUTO-RTO: 0.1 /100 WBC (ref 0–0.2)
NRBC BLD AUTO-RTO: 0.1 /100 WBC (ref 0–0.2)
PCO2 BLDA: 37.7 MM HG (ref 35–48)
PH BLDA: 7.36 PH UNITS (ref 7.35–7.45)
PHOSPHATE SERPL-MCNC: 5.3 MG/DL (ref 2.5–4.5)
PLATELET # BLD AUTO: 109 10*3/MM3 (ref 140–450)
PLATELET # BLD AUTO: 126 10*3/MM3 (ref 140–450)
PMV BLD AUTO: 7.6 FL (ref 6–12)
PMV BLD AUTO: 7.9 FL (ref 6–12)
PO2 BLDA: 131.3 MM HG (ref 83–108)
POTASSIUM BLDA-SCNC: 5.2 MMOL/L (ref 3.5–4.5)
POTASSIUM SERPL-SCNC: 5.2 MMOL/L (ref 3.5–5.2)
POTASSIUM SERPL-SCNC: 5.4 MMOL/L (ref 3.5–5.2)
PROT SERPL-MCNC: 6.8 G/DL (ref 6–8.5)
RBC # BLD AUTO: 3.06 10*6/MM3 (ref 4.14–5.8)
RBC # BLD AUTO: 3.56 10*6/MM3 (ref 4.14–5.8)
SAO2 % BLDCOA: 98.9 % (ref 94–98)
SODIUM BLD-SCNC: 132 MMOL/L (ref 138–146)
SODIUM SERPL-SCNC: 132 MMOL/L (ref 136–145)
TROPONIN T SERPL-MCNC: <0.01 NG/ML (ref 0–0.03)
WBC # BLD AUTO: 16.7 10*3/MM3 (ref 3.4–10.8)
WBC # BLD AUTO: 18.5 10*3/MM3 (ref 3.4–10.8)
WHOLE BLOOD HOLD SPECIMEN: NORMAL
WHOLE BLOOD HOLD SPECIMEN: NORMAL

## 2021-01-03 PROCEDURE — 36600 WITHDRAWAL OF ARTERIAL BLOOD: CPT

## 2021-01-03 PROCEDURE — 85018 HEMOGLOBIN: CPT

## 2021-01-03 PROCEDURE — 80051 ELECTROLYTE PANEL: CPT

## 2021-01-03 PROCEDURE — 94660 CPAP INITIATION&MGMT: CPT

## 2021-01-03 PROCEDURE — 82330 ASSAY OF CALCIUM: CPT

## 2021-01-03 PROCEDURE — 82803 BLOOD GASES ANY COMBINATION: CPT

## 2021-01-03 PROCEDURE — 63710000001 INSULIN GLARGINE PER 5 UNITS: Performed by: NURSE PRACTITIONER

## 2021-01-03 PROCEDURE — 71045 X-RAY EXAM CHEST 1 VIEW: CPT

## 2021-01-03 PROCEDURE — 85025 COMPLETE CBC W/AUTO DIFF WBC: CPT | Performed by: INTERNAL MEDICINE

## 2021-01-03 PROCEDURE — 82962 GLUCOSE BLOOD TEST: CPT

## 2021-01-03 PROCEDURE — 25010000002 ENOXAPARIN PER 10 MG: Performed by: INTERNAL MEDICINE

## 2021-01-03 PROCEDURE — 94799 UNLISTED PULMONARY SVC/PX: CPT

## 2021-01-03 PROCEDURE — 80053 COMPREHEN METABOLIC PANEL: CPT | Performed by: INTERNAL MEDICINE

## 2021-01-03 PROCEDURE — 25010000002 MEROPENEM PER 100 MG: Performed by: INTERNAL MEDICINE

## 2021-01-03 PROCEDURE — 83605 ASSAY OF LACTIC ACID: CPT

## 2021-01-03 PROCEDURE — 99233 SBSQ HOSP IP/OBS HIGH 50: CPT | Performed by: INTERNAL MEDICINE

## 2021-01-03 PROCEDURE — 84100 ASSAY OF PHOSPHORUS: CPT | Performed by: INTERNAL MEDICINE

## 2021-01-03 PROCEDURE — 83735 ASSAY OF MAGNESIUM: CPT | Performed by: INTERNAL MEDICINE

## 2021-01-03 PROCEDURE — 84484 ASSAY OF TROPONIN QUANT: CPT | Performed by: INTERNAL MEDICINE

## 2021-01-03 RX ORDER — IPRATROPIUM BROMIDE AND ALBUTEROL SULFATE 2.5; .5 MG/3ML; MG/3ML
3 SOLUTION RESPIRATORY (INHALATION) EVERY 6 HOURS PRN
Status: DISCONTINUED | OUTPATIENT
Start: 2021-01-03 | End: 2021-01-05

## 2021-01-03 RX ORDER — QUETIAPINE FUMARATE 25 MG/1
50 TABLET, FILM COATED ORAL NIGHTLY
Status: DISCONTINUED | OUTPATIENT
Start: 2021-01-03 | End: 2021-01-09 | Stop reason: HOSPADM

## 2021-01-03 RX ADMIN — ATENOLOL 50 MG: 50 TABLET ORAL at 10:00

## 2021-01-03 RX ADMIN — Medication 10 ML: at 21:54

## 2021-01-03 RX ADMIN — Medication 10 ML: at 09:50

## 2021-01-03 RX ADMIN — POLYETHYLENE GLYCOL 3350 17 G: 17 POWDER, FOR SOLUTION ORAL at 09:49

## 2021-01-03 RX ADMIN — SODIUM CHLORIDE 125 ML/HR: 900 INJECTION INTRAVENOUS at 14:57

## 2021-01-03 RX ADMIN — ACETAMINOPHEN 650 MG: 325 TABLET, FILM COATED ORAL at 09:53

## 2021-01-03 RX ADMIN — DIVALPROEX SODIUM 250 MG: 250 TABLET, DELAYED RELEASE ORAL at 09:49

## 2021-01-03 RX ADMIN — ENOXAPARIN SODIUM 30 MG: 30 INJECTION SUBCUTANEOUS at 20:02

## 2021-01-03 RX ADMIN — DIVALPROEX SODIUM 125 MG: 125 TABLET, DELAYED RELEASE ORAL at 14:53

## 2021-01-03 RX ADMIN — MEROPENEM 500 MG: 500 INJECTION, POWDER, FOR SOLUTION INTRAVENOUS at 20:03

## 2021-01-03 RX ADMIN — MEROPENEM 1 G: 1 INJECTION, POWDER, FOR SOLUTION INTRAVENOUS at 12:25

## 2021-01-03 RX ADMIN — INSULIN GLARGINE 18 UNITS: 100 INJECTION, SOLUTION SUBCUTANEOUS at 20:03

## 2021-01-03 RX ADMIN — ACETAMINOPHEN 650 MG: 650 SUPPOSITORY RECTAL at 19:05

## 2021-01-03 NOTE — CONSULTS
Inpatient Nephrology Consult  Consult performed by: Chaz Abraham MD  Consult ordered by: Davide Mills MD  Reason for consult: betty          Patient Care Team:  Provider, No Known as PCP - Vincent Yao MD as Consulting Physician (Hospitalist)    Chief complaint:fell    Subjective     Asked to see this unfortunate [facility] resident for betty.  He apparently fell at his facility and was sent here.  He is a difficult historian; some of his answers are audible and intelligible, some are not.  From staff and the record he requires a 1:1 sitter.  He has been confused.  He was found to have a creatinine of 2.4 yesterday and that worsened to 3.2 today prompting renal consultation.  He was on ibuprofen prn at his facility as well as lisinopril.  He has been hemodynamically stable, has been given iv abx and ivf, and has received no nephrotoxins.  He has not been oliguric, and has had no hemoptysis or gross hematuria.      Review of Systems   Unable to perform ROS: Mental status change        Past Medical History:   Diagnosis Date   • Antisocial personality disorder (CMS/[facility])    • Bipolar disorder (CMS/[facility])    • Chronic kidney disease    • Convulsions (CMS/[facility])    • Diabetes mellitus (CMS/[facility])    • Hypertension    • Insomnia    • Renal disorder    • Schizophrenia (CMS/[facility])      Family History   Problem Relation Age of Onset   • No Known Problems Mother    • No Known Problems Father      Social History     Tobacco Use   • Smoking status: Current Every Day Smoker     Packs/day: 0.50     Types: Cigarettes   • Smokeless tobacco: Never Used   Substance Use Topics   • Alcohol use: Never     Frequency: Never   • Drug use: Never       Current Facility-Administered Medications:   •  acetaminophen (TYLENOL) tablet 650 mg, 650 mg, Oral, Q4H PRN, 650 mg at 01/03/21 0953 **OR** acetaminophen (TYLENOL) 160 MG/5ML solution 650 mg, 650 mg, Oral, Q4H PRN **OR** acetaminophen (TYLENOL) suppository 650 mg, 650 mg, Rectal, Q4H PRN,  Anita Farnsworth FNP  •  atenolol (TENORMIN) tablet 50 mg, 50 mg, Oral, Daily, Anita Farnsworth FNP, 50 mg at 01/03/21 1000  •  dextrose (D50W) 25 g/ 50mL Intravenous Solution 25 g, 25 g, Intravenous, Q15 Min PRN, Anita Farnsworth FNP  •  dextrose (GLUTOSE) oral gel 15 g, 15 g, Oral, Q15 Min PRN, Anita Farnsworth FNP  •  divalproex (DEPAKOTE) DR tablet 125 mg, 125 mg, Oral, Daily, Anita Farnsworth FNP, 125 mg at 01/03/21 1453  •  divalproex (DEPAKOTE) DR tablet 250 mg, 250 mg, Oral, Daily, Anita Farnsworth FNP, 250 mg at 01/03/21 0949  •  divalproex (DEPAKOTE) DR tablet 500 mg, 500 mg, Oral, Nightly, Anita Farnsworth FNP, 500 mg at 01/02/21 2022  •  enoxaparin (LOVENOX) syringe 40 mg, 40 mg, Subcutaneous, Daily, Anita Farnsworth FNP, 40 mg at 01/02/21 1517  •  glucagon (human recombinant) (GLUCAGEN DIAGNOSTIC) injection 1 mg, 1 mg, Subcutaneous, Q15 Min PRN, Anita Farnsworth FNP  •  insulin glargine (LANTUS, SEMGLEE) injection 18 Units, 18 Units, Subcutaneous, Nightly, Anita Farnsworth FNP, 18 Units at 01/02/21 2025  •  insulin lispro (humaLOG, ADMELOG) injection 0-7 Units, 0-7 Units, Subcutaneous, TID AC **AND** insulin lispro (humaLOG, ADMELOG) injection 0-7 Units, 0-7 Units, Subcutaneous, PRN, Anita Farnsworth FNP  •  melatonin tablet 10 mg, 10 mg, Oral, Nightly, Anita Farnsworth FNP, 10 mg at 01/02/21 2021  •  meropenem (MERREM) 500 mg in sodium chloride 0.9 % 100 mL IVPB, 500 mg, Intravenous, Q8H, Davide Mills MD  •  mirtazapine (REMERON) tablet 7.5 mg, 7.5 mg, Oral, Nightly, Anita Farnsworth FNP, 7.5 mg at 01/02/21 2022  •  ondansetron (ZOFRAN) tablet 4 mg, 4 mg, Oral, Q6H PRN **OR** ondansetron (ZOFRAN) injection 4 mg, 4 mg, Intravenous, Q6H PRN, Anita Farnsworth FNP  •  polyethylene glycol (MIRALAX) packet 17 g, 17 g, Oral, BID, Davide Mills MD, 17 g at 01/03/21 0949  •  QUEtiapine (SEROquel) tablet 50 mg, 50 mg, Oral, Nightly, Davide Mills MD  •  sodium chloride 0.9 % flush 10 mL, 10 mL, Intravenous, Q12H, Anita Farnsworth FNP, 10 mL at 01/03/21 0950  •  sodium  chloride 0.9 % flush 10 mL, 10 mL, Intravenous, PRN, Anita Farnsworth, EFRAIN  •  sodium chloride 0.9 % infusion, 125 mL/hr, Intravenous, Continuous, Davide Mills MD, Last Rate: 125 mL/hr at 01/03/21 1457, 125 mL/hr at 01/03/21 1457      Objective      Vital Signs  Temp:  [98.1 °F (36.7 °C)-99.3 °F (37.4 °C)] 98.5 °F (36.9 °C)  Heart Rate:  [71-86] 71  Resp:  [20-25] 20  BP: (133-143)/(61-81) 133/61    Physical Exam  Vitals signs and nursing note reviewed.   Constitutional:       General: He is not in acute distress.     Appearance: Normal appearance. He is normal weight. He is not ill-appearing or toxic-appearing.   HENT:      Head: Normocephalic and atraumatic.      Right Ear: External ear normal.      Left Ear: External ear normal.      Nose: Nose normal.      Mouth/Throat:      Mouth: Mucous membranes are dry.      Pharynx: No oropharyngeal exudate.      Comments: Membranes are quite dry  Eyes:      General: No scleral icterus.     Pupils: Pupils are equal, round, and reactive to light.   Neck:      Musculoskeletal: Neck supple.      Comments: Flat jvp  Cardiovascular:      Rate and Rhythm: Tachycardia present.      Pulses: Normal pulses.      Heart sounds: No murmur. No gallop.    Pulmonary:      Effort: Pulmonary effort is normal.      Breath sounds: Normal breath sounds. No rales.   Abdominal:      General: There is no distension.      Palpations: Abdomen is soft. There is no mass.   Musculoskeletal:      Right lower leg: No edema.      Left lower leg: No edema.   Skin:     General: Skin is warm and dry.   Neurological:      General: No focal deficit present.      Motor: Weakness present.         Results Review    Radiology noted    Lab Results (last 24 hours)     Procedure Component Value Units Date/Time    Basic Metabolic Panel [[id number]]  (Abnormal) Collected: 01/03/21 1217    Specimen: Blood Updated: 01/03/21 1314     Glucose 145 mg/dL      BUN 66 mg/dL      Comment: Result checked         Creatinine 3.26 mg/dL        Potassium 5.2 mmol/L      Chloride 101 mmol/L      CO2 21.0 mmol/L      Calcium 8.4 mg/dL      eGFR Non African Amer 19 mL/min/1.73      BUN/Creatinine Ratio 20.2     Anion Gap 9.0 mmol/L     Narrative:      GFR Normal >60  Chronic Kidney Disease <60  Kidney Failure <15      CBC & Differential [[id number]]  (Abnormal) Collected: 01/03/21 1217    Specimen: Blood Updated: 01/03/21 1230    Narrative:      The following orders were created for panel order CBC & Differential.  Procedure                               Abnormality         Status                     ---------                               -----------         ------                     CBC Auto Differential[[id number]]        Abnormal            Final result                 Please view results for these tests on the individual orders.    CBC Auto Differential [[id number]]  (Abnormal) Collected: 01/03/21 1217    Specimen: Blood Updated: 01/03/21 1230     WBC 16.70 10*3/mm3      RBC 3.06 10*6/mm3      Hemoglobin 9.1 g/dL      Hematocrit 27.7 %      MCV 90.5 fL      MCH 29.6 pg      MCHC 32.7 g/dL      RDW 16.4 %      RDW-SD 52.5 fl      MPV 7.9 fL      Platelets 109 10*3/mm3      Neutrophil % 90.5 %      Lymphocyte % 3.7 %      Monocyte % 5.6 %      Eosinophil % 0.1 %      Basophil % 0.1 %      Neutrophils, Absolute 15.20 10*3/mm3      Lymphocytes, Absolute 0.60 10*3/mm3      Monocytes, Absolute 0.90 10*3/mm3      Eosinophils, Absolute 0.00 10*3/mm3      Basophils, Absolute 0.00 10*3/mm3      nRBC 0.1 /100 WBC     POC Glucose Once [[id number]]  (Abnormal) Collected: 01/03/21 1144    Specimen: Blood Updated: 01/03/21 1149     Glucose 127 mg/dL      Comment: Serial Number: [id number]:  [id number]       POC Glucose Once [[id number]]  (Normal) Collected: 01/03/21 0728    Specimen: Blood Updated: 01/03/21 0729     Glucose 94 mg/dL      Comment: Serial Number: [id number]:  [id number]       Blood Culture - Blood, Arm, Left [[id number]]  (Abnormal) Collected:  01/01/21 2255    Specimen: Blood from Arm, Left Updated: 01/03/21 0727     Blood Culture Escherichia coli     Isolated from Aerobic and Anaerobic Bottles     Gram Stain Anaerobic Bottle Gram negative bacilli      Aerobic Bottle Gram negative bacilli    Blood Culture - Blood, Hand, Left [[id number]]  (Abnormal) Collected: 01/01/21 2255    Specimen: Blood from Hand, Left Updated: 01/03/21 0726     Blood Culture Escherichia coli     Isolated from Aerobic and Anaerobic Bottles     Gram Stain Anaerobic Bottle Gram negative bacilli      Aerobic Bottle Gram negative bacilli    Urine Culture - Urine, Urine, Catheter [[id number]]  (Abnormal)  (Susceptibility) Collected: 01/01/21 1852    Specimen: Urine, Catheter Updated: 01/03/21 0208     Urine Culture >100,000 CFU/mL Escherichia coli ESBL     Comment: Consider infectious disease consult.  Susceptibility results may not correlate to clinical outcomes.       Susceptibility      Escherichia coli ESBL     ANA     Amikacin Susceptible     Ertapenem Susceptible     Gentamicin Resistant     Levofloxacin Resistant     Meropenem Susceptible     Nitrofurantoin Susceptible     Piperacillin + Tazobactam Susceptible     Tetracycline Resistant     Tobramycin Resistant     Trimethoprim + Sulfamethoxazole Resistant                    POC Glucose Once [[id number]]  (Abnormal) Collected: 01/02/21 2009    Specimen: Blood Updated: 01/02/21 2010     Glucose 176 mg/dL      Comment: Serial Number: [id number]:  [id number]       POC Glucose Once [[id number]]  (Normal) Collected: 01/02/21 1630    Specimen: Blood Updated: 01/02/21 1632     Glucose 100 mg/dL      Comment: Serial Number: [id number]:  [id number]       Hemoglobin A1c [[id number]]  (Abnormal) Collected: 01/01/21 1733    Specimen: Blood Updated: 01/02/21 1602     Hemoglobin A1C 6.1 %     Narrative:      Hemoglobin A1C Reference Range:    <5.7 %        Normal  5.7-6.4 %     Increased risk for diabetes  > 6.4 %        Diabetes            These guidelines have been recommended by the American Diabetic Association for Hgb A1c.      The following 2010 guidelines have been recommended by the American Diabetes Association for Hemoglobin A1c.    HBA1c 5.7-6.4% Increased risk for future diabetes (pre-diabetes)  HBA1c     >6.4% Diabetes                Assessment/Plan       E coli bacteremia    Fall    SRAVAN (acute kidney injury) (CMS/[facility])    Stage 3b chronic kidney disease    UTI (urinary tract infection), bacterial    Polypharmacy      Assessment & Plan    1. sravan-worse than baseline and worse than yesterday, with clinical hypovolemia I suspect it is due to prerenal azotemia, time will tell; continue ivf, avoid nephrotoxins, renally dose meds, check a renal ultrasound if no better tomorrow, check lab in am  2. Hypovolemia-severe, due to decreased intake and increased insensible losses  3. Metabolic acidosis-due to renal failure  4. Hypertension - controlled  5. Schizophrenia  6. Type 2 dm  7. E coli uti with bacteremia and sepsis      I discussed the patients findings and my recommendations with patient    Chaz Abraham MD  01/03/21  15:23 EST    Time:

## 2021-01-03 NOTE — PLAN OF CARE
Goal Outcome Evaluation:  Plan of Care Reviewed With: patient     Outcome Summary: Pt did better during the night.  Risk of falling due to being confused.  Pt did come back postive for ESBL.  Will continue to monitor.

## 2021-01-03 NOTE — PLAN OF CARE
Goal Outcome Evaluation:  Plan of Care Reviewed With: patient       Patient had increased difficulty breathing this afternnoon.  Respirations in the 40's.  Fast team initiated.  CXR done.  Orders to place patient on bipap.  Family updated.

## 2021-01-03 NOTE — PROGRESS NOTES
[facility]  INPATIENT PROGRESS NOTE  362/1   Hospitalist Team  LOS 2 days      Patient Care Team:  Provider, No Known as PCP - Vincent Yao MD as Consulting Physician (Hospitalist)      Patient was examined with relevant and adequate PPE keeping in mind the current coronavirus pandemic. Minimum of 10 minutes to don and doff PPE.    Chief Complaint / Subjective  Chief Complaint   Patient presents with   • Fall       HPI (4 hpi elements or status of 3 chronic)     65-year-old male presents the ER with a chief complaint of left elbow pain after being found on the floor at the F today.  The patient reports he does not remember falling.  He states he probably just tripped.  The patient told me that he has only been at the [facility] for approximately a week.  However, with review of records it looks that the patient is a long-term [facility] resident.    Patient unable to provide any history drowsy.    No change.  Still nonverbal mostly       Fall          History taken from: chart    Review of Systems   Unable to perform ROS: dementia             Family History   Problem Relation Age of Onset   • No Known Problems Mother    • No Known Problems Father        Past Medical History:   Diagnosis Date   • Antisocial personality disorder (CMS/HCC)    • Bipolar disorder (CMS/HCC)    • Chronic kidney disease    • Convulsions (CMS/HCC)    • Diabetes mellitus (CMS/HCC)    • Hypertension    • Insomnia    • Renal disorder    • Schizophrenia (CMS/HCC)        Social History     Socioeconomic History   • Marital status:      Spouse name: Not on file   • Number of children: Not on file   • Years of education: Not on file   • Highest education level: Not on file   Tobacco Use   • Smoking status: Current Every Day Smoker     Packs/day: 0.50     Types: Cigarettes   • Smokeless tobacco: Never Used   Substance and Sexual Activity   • Alcohol use: Never     Frequency: Never   • Drug use: Never   •  Sexual activity: Defer       Prior to Admission medications    Medication Sig Start Date End Date Taking? Authorizing Provider   acetaminophen (TYLENOL) 325 MG tablet Take 650 mg by mouth Every 4 (Four) Hours As Needed for Mild Pain  or Fever.   Yes Jesus Bowser MD   atenolol (TENORMIN) 50 MG tablet Take 50 mg by mouth Daily.   Yes Jesus Bowser MD   divalproex (DEPAKOTE) 125 MG DR tablet Take 125 mg by mouth Daily.   Yes Jesus Bowser MD   divalproex (DEPAKOTE) 250 MG DR tablet Take 250 mg by mouth Daily.   Yes Jesus Bowser MD   divalproex (DEPAKOTE) 500 MG DR tablet Take 500 mg by mouth Every Night.   Yes Jesus Bowser MD   fluPHENAZine (PROLIXIN) 1 MG tablet Take 1 mg by mouth 3 (Three) Times a Day.   Yes Jesus Bowser MD   glucagon (GlucaGen HypoKit) 1 MG injection Infuse 1 mg into a venous catheter 2 (Two) Times a Day As Needed for Low Blood Sugar.   Yes Jesus Bowser MD   ibuprofen (ADVIL,MOTRIN) 800 MG tablet Take 800 mg by mouth Every 8 (Eight) Hours As Needed for Mild Pain .   Yes Jesus Bowser MD   insulin glargine (LANTUS, SEMGLEE) 100 UNIT/ML injection Inject 20 Units under the skin into the appropriate area as directed Every Night.   Yes Jesus Bowser MD   lisinopril (PRINIVIL,ZESTRIL) 5 MG tablet Take 5 mg by mouth Daily.   Yes Jesus Bowser MD   melatonin 5 MG tablet tablet Take 10 mg by mouth Every Night.   Yes Jesus Bowser MD   Menthol, Topical Analgesic, (Biofreeze) 4 % gel Apply  topically 2 (Two) Times a Day As Needed. BL Feet legs back and hips   Yes Jesus Bowser MD   mirtazapine (REMERON) 7.5 MG tablet Take 7.5 mg by mouth Every Night.   Yes Jesus Bowser MD   Prenatal Vit-Fe Fumarate-FA (M-Vit) tablet Take 1 tablet by mouth Daily.   Yes Jesus Bowser MD   promethazine (PHENERGAN) 12.5 MG tablet Take 12.5 mg by mouth Every 6 (Six) Hours As Needed for Nausea or Vomiting.   Yes  "Provider, MD Jesus   QUEtiapine (SEROquel) 100 MG tablet Take 100 mg by mouth Every Night.   Yes Provider, MD Jesus        Objective    Physical Exam     Vital Signs  Temp:  [98.1 °F (36.7 °C)-99.3 °F (37.4 °C)] 98.5 °F (36.9 °C)  Heart Rate:  [71-86] 71  Resp:  [20-25] 20  BP: (133-143)/(61-81) 133/61    Oxygen Therapy  SpO2: 97 %  Pulse Oximetry Type: Intermittent  Device (Oxygen Therapy): nasal cannula  Flow (L/min): 2  Flowsheet Rows      First Filed Value   Admission Height  182.9 cm (72\") Documented at 01/01/2021 1711   Admission Weight  90.3 kg (199 lb) Documented at 01/01/2021 1711        Weight change:    Intake & Output (last 3 days)       12/31 0701 - 01/01 0700 01/01 0701 - 01/02 0700 01/02 0701 - 01/03 0700 01/03 0701 - 01/04 0700    P.O.  240 1490 360    IV Piggyback  1000      Total Intake(mL/kg)  1240 (14.4) 1490 (17.3) 360 (4.2)    Urine (mL/kg/hr)   525 (0.3)     Stool   0     Total Output   525     Net  +1240 +965 +360            Urine Unmeasured Occurrence   3 x 2 x    Stool Unmeasured Occurrence   1 x         Lines, Drains & Airways    Active LDAs     Name:   Placement date:   Placement time:   Site:   Days:    Peripheral IV 01/02/21 0100 Anterior;Proximal;Right;Upper Arm   01/02/21    0100    Arm   less than 1                Physical Exam:    Physical Exam  Vitals signs and nursing note reviewed.   Constitutional:       General: He is not in acute distress.     Appearance: Normal appearance. He is well-developed. He is not ill-appearing, toxic-appearing or diaphoretic.   HENT:      Head: Normocephalic and atraumatic.      Right Ear: Ear canal and external ear normal.      Left Ear: Ear canal and external ear normal.      Nose: Nose normal. No congestion or rhinorrhea.      Mouth/Throat:      Mouth: Mucous membranes are dry.      Pharynx: No oropharyngeal exudate.   Eyes:      General: No scleral icterus.        Right eye: No discharge.         Left eye: No discharge.      " Extraocular Movements: Extraocular movements intact.      Conjunctiva/sclera: Conjunctivae normal.      Pupils: Pupils are equal, round, and reactive to light.   Neck:      Musculoskeletal: Normal range of motion and neck supple. No neck rigidity or muscular tenderness.      Thyroid: No thyromegaly.      Vascular: No carotid bruit or JVD.      Trachea: No tracheal deviation.   Cardiovascular:      Rate and Rhythm: Normal rate and regular rhythm.      Pulses: Normal pulses.      Heart sounds: Normal heart sounds. No murmur. No friction rub. No gallop.    Pulmonary:      Effort: Pulmonary effort is normal. No respiratory distress.      Breath sounds: No stridor. Wheezing present. No rhonchi or rales.   Chest:      Chest wall: No tenderness.   Abdominal:      General: Bowel sounds are normal. There is no distension.      Palpations: Abdomen is soft. There is no mass.      Tenderness: There is no abdominal tenderness. There is no guarding or rebound.      Hernia: No hernia is present.      Comments: Nonspecific discomfort on palpation   Musculoskeletal: Normal range of motion.         General: No swelling, tenderness, deformity or signs of injury.      Right lower leg: No edema.      Left lower leg: No edema.   Lymphadenopathy:      Cervical: No cervical adenopathy.   Skin:     General: Skin is warm and dry.      Coloration: Skin is not jaundiced or pale.      Findings: No bruising, erythema or rash.   Neurological:      General: No focal deficit present.      Mental Status: He is alert. Mental status is at baseline.      Cranial Nerves: No cranial nerve deficit.      Sensory: No sensory deficit.      Motor: No weakness or abnormal muscle tone.      Coordination: Coordination normal.      Comments: Drowsy               PT Recommendation and Plan             Procedures:    * No surgery found *     Assessment/Plan with Problem wise       Active Hospital Problems    Diagnosis  POA   • **E coli bacteremia [R78.81, B96.20]   Yes     Priority: High   • SRAVAN (acute kidney injury) (CMS/[facility]) [N17.9]  Yes   • Stage 3b chronic kidney disease [N18.32]  Yes   • UTI (urinary tract infection), bacterial [N39.0, A49.9]  Yes   • Polypharmacy [Z79.899]  Not Applicable   • Fall [W19.XXXA]  Yes      Resolved Hospital Problems   No resolved problems to display.        Estimated Creatinine Clearance: 41.1 mL/min (A) (by C-G formula based on SCr of 2.24 mg/dL (H)).    Code Status and Medical Interventions:   Ordered at: 01/01/21 2129     Code Status:    CPR     Medical Interventions (Level of Support Prior to Arrest):    Full       MEDICAL DECISION MAKING COMPLEXITY BY PROBLEM:     E. coli bacteremia  Secondary to UTI  Meropenem    UTI:  Cultures  Empiric antibiotics till cultures finalize  Remove Mclain when not needed  Screening urinalysis should not be done and is not recommended by any professional society or organization.  Does not achieve any purpose except antibiotic resistance and adverse effects like C. difficile colitis      Renal failure/insufficiency/injury:  Hydration  Supportive treatment  Cut down or hold ACE inhibitors  Avoid nephrotoxic medications   Address diuretics      Polypharmacy probably contributing to his current condition and constipation.  And impaired intake.    Care coordination with nursing 01/02/21 10:15 AM EST.    Care coordination with nursing and pharmacy regarding  his antibiotics    Plan for disposition:        SLP OP Goals     Row Name 01/02/21 1431          Time Calculation    PT Goal Re-Cert Due Date  01/16/21  -       User Key  (r) = Recorded By, (t) = Taken By, (c) = Cosigned By    Initials Name Provider Type    Eloisa Lei, PT Physical Therapist          Continued Care and Services - Admitted Since 1/1/2021    Coordination has not been started for this encounter.        Historical & Objective Data     Results Review:    I reviewed the patient's new lab and radiology results. 01/03/21     Results from last 7  days   Lab Units 01/01/21  1733   WBC 10*3/mm3 15.30*   HEMOGLOBIN g/dL 10.4*   HEMATOCRIT % 31.3*   MCV fL 89.1   MCH pg 29.6   MPV fL 7.5   RDW % 16.2*   PLATELETS 10*3/mm3 162   MONOCYTES % % 17.0*     Results from last 7 days   Lab Units 01/01/21  1733   SODIUM mmol/L 133*   POTASSIUM mmol/L 4.7   CHLORIDE mmol/L 99   CO2 mmol/L 24.0   BUN mg/dL 35*   CREATININE mg/dL 2.24*   CALCIUM mg/dL 8.9   BILIRUBIN mg/dL 0.4   ALK PHOS U/L 100   ALT (SGPT) U/L 12   AST (SGOT) U/L 22   GLUCOSE mg/dL 115*     Inflammatory Biomarkers        Invalid input(s): ESR, D-DIMER QUANTITATIVE,  PROCALCITONIN,    No results found for: PHOS  Hemoglobin A1C   Date Value Ref Range Status   01/01/2021 6.1 (H) 3.5 - 5.6 % Final     No results found for: CHOL, CHLPL, TRIG, HDL, LDL, LDLDIRECT  No results found for: LIPASE            Pathology  No results found for: INTRAOP, PREDX, FINALDX, COMDX  COVID19   Date Value Ref Range Status   01/01/2021 Presumptive Negative Presumptive Negative - Ref. Range Final        Microbiology Results (last 10 days)     Procedure Component Value - Date/Time    Blood Culture - Blood, Arm, Left [[id number]]  (Abnormal) Collected: 01/01/21 2255    Lab Status: Preliminary result Specimen: Blood from Arm, Left Updated: 01/03/21 0727     Blood Culture Escherichia coli     Isolated from Aerobic and Anaerobic Bottles     Gram Stain Anaerobic Bottle Gram negative bacilli      Aerobic Bottle Gram negative bacilli    Blood Culture - Blood, Hand, Left [[id number]]  (Abnormal) Collected: 01/01/21 2255    Lab Status: Preliminary result Specimen: Blood from Hand, Left Updated: 01/03/21 0726     Blood Culture Escherichia coli     Isolated from Aerobic and Anaerobic Bottles     Gram Stain Anaerobic Bottle Gram negative bacilli      Aerobic Bottle Gram negative bacilli    Blood Culture ID, PCR - Blood, Hand, Left [[id number]]  (Abnormal) Collected: 01/01/21 2255    Lab Status: Final result Specimen: Blood from Hand, Left  Updated: 01/02/21 1047     BCID, PCR Escherichia coli. Identification by BCID PCR.     BOTTLE TYPE Anaerobic Bottle    COVID-19, ABBOTT IN-HOUSE,NASAL Swab (NO TRANSPORT MEDIA) 2 HR TAT - Swab, Nasopharynx [[id number]]  (Normal) Collected: 01/01/21 2013    Lab Status: Final result Specimen: Swab from Nasopharynx Updated: 01/01/21 2037     COVID19 Presumptive Negative    Narrative:      Fact sheet for providers: https://www.fda.gov/media/636427/download     Fact sheet for patients: https://www.fda.gov/media/535532/download    Test performed by PCR.  If inconsistent with clinical signs and symptoms patient should be tested with different authorized molecular test.    Urine Culture - Urine, Urine, Catheter [[id number]]  (Abnormal)  (Susceptibility) Collected: 01/01/21 1852    Lab Status: Final result Specimen: Urine, Catheter Updated: 01/03/21 0208     Urine Culture >100,000 CFU/mL Escherichia coli ESBL     Comment: Consider infectious disease consult.  Susceptibility results may not correlate to clinical outcomes.       Susceptibility      Escherichia coli ESBL     ANA     Amikacin Susceptible     Ertapenem Susceptible     Gentamicin Resistant     Levofloxacin Resistant     Meropenem Susceptible     Nitrofurantoin Susceptible     Piperacillin + Tazobactam Susceptible     Tetracycline Resistant     Tobramycin Resistant     Trimethoprim + Sulfamethoxazole Resistant                          ECG/EMG Results (most recent)     Procedure Component Value Units Date/Time    ECG 12 Lead [[id number]] Collected: 01/01/21 1728     Updated: 01/01/21 1729     QT Interval 371 ms     Narrative:      HEART RATE= 87  bpm  RR Interval= 692  ms  RI Interval= 120  ms  P Horizontal Axis= 5  deg  P Front Axis= 54  deg  QRSD Interval= 95  ms  QT Interval= 371  ms  QRS Axis= 23  deg  T Wave Axis= 76  deg  - ABNORMAL ECG -  Sinus rhythm  ST elevation, consider anterior injury  Electronically Signed By:   Date and Time of Study: 2021-01-01 17:28:09                     Ct Head Without Contrast    Result Date: 1/1/2021   1. Motion artifact which limits the study, especially to the skull base. 2. No large parenchymal hemorrhage. 3. There appears to be volume loss secondary to atrophy.  Electronically Signed By-Erick Bautista MD On:1/1/2021 6:13 PM This report was finalized on [id number] by  Erick Bautista MD.    Ct Cervical Spine Without Contrast    Result Date: 1/1/2021   1. Limited study due to marked motion artifact at the C4-C6 level. It is difficult to exclude an acute fracture or dislocation in this area. 2. Degenerative spondylosis and facet arthritis. 3. Varying degrees of canal and neural foraminal stenosis which was better appreciated on the previous CT exam where no motion artifact was apparent.  Electronically Signed By-Erick Bautista MD On:1/1/2021 6:34 PM This report was finalized on [id number] by  Erick Bautista MD.    Xr Chest 1 View    Result Date: 1/1/2021  No active disease.  Electronically Signed By-Erick Bautista MD On:1/1/2021 5:59 PM This report was finalized on [id number] by  Erick Bautista MD.      I personally reviewed patient's x-ray films and my findings are: 0    I personally reviewed patient's EKG strip and my findings are: 0    Medication Review:   I have reviewed the patient's current medication list 01/03/21     Scheduled Meds  atenolol, 50 mg, Oral, Daily  divalproex, 125 mg, Oral, Daily  divalproex, 250 mg, Oral, Daily  divalproex, 500 mg, Oral, Nightly  enoxaparin, 40 mg, Subcutaneous, Daily  insulin glargine, 18 Units, Subcutaneous, Nightly  insulin lispro, 0-7 Units, Subcutaneous, TID AC  melatonin, 10 mg, Oral, Nightly  mirtazapine, 7.5 mg, Oral, Nightly  piperacillin-tazobactam, 3.375 g, Intravenous, Once  piperacillin-tazobactam, 3.375 g, Intravenous, Q8H  polyethylene glycol, 17 g, Oral, BID  QUEtiapine, 100 mg, Oral, Nightly  sodium chloride, 10 mL, Intravenous, Q12H        Meds Infusions  sodium chloride, 125 mL/hr, Last Rate:  125 mL/hr (01/02/21 2025)        DVT prophylaxis  Mechanical Order History:     None      Pharmalogical Order History:      Ordered     Dose Route Frequency Stop    01/01/21 2129  enoxaparin (LOVENOX) syringe 40 mg      40 mg SC Daily --                Meds PRN  •  acetaminophen **OR** acetaminophen **OR** acetaminophen  •  dextrose  •  dextrose  •  glucagon (human recombinant)  •  insulin lispro **AND** insulin lispro  •  ondansetron **OR** ondansetron  •  sodium chloride      Davide Mills MD  01/03/21  11:53 EST

## 2021-01-04 ENCOUNTER — APPOINTMENT (OUTPATIENT)
Dept: CT IMAGING | Facility: HOSPITAL | Age: 66
End: 2021-01-04

## 2021-01-04 LAB
ALBUMIN SERPL-MCNC: 2.2 G/DL (ref 3.5–5.2)
ALBUMIN/GLOB SERPL: 0.6 G/DL
ALP SERPL-CCNC: 274 U/L (ref 39–117)
ALT SERPL W P-5'-P-CCNC: 20 U/L (ref 1–41)
ANION GAP SERPL CALCULATED.3IONS-SCNC: 10 MMOL/L (ref 5–15)
AST SERPL-CCNC: 34 U/L (ref 1–40)
BACTERIA SPEC AEROBE CULT: ABNORMAL
BACTERIA SPEC AEROBE CULT: ABNORMAL
BILIRUB SERPL-MCNC: 0.4 MG/DL (ref 0–1.2)
BUN SERPL-MCNC: 69 MG/DL (ref 8–23)
BUN/CREAT SERPL: 20.2 (ref 7–25)
CALCIUM SPEC-SCNC: 8.7 MG/DL (ref 8.6–10.5)
CHLORIDE SERPL-SCNC: 103 MMOL/L (ref 98–107)
CO2 SERPL-SCNC: 20 MMOL/L (ref 22–29)
CREAT SERPL-MCNC: 3.41 MG/DL (ref 0.76–1.27)
DEPRECATED RDW RBC AUTO: 50.8 FL (ref 37–54)
ERYTHROCYTE [DISTWIDTH] IN BLOOD BY AUTOMATED COUNT: 16.3 % (ref 12.3–15.4)
GFR SERPL CREATININE-BSD FRML MDRD: 18 ML/MIN/1.73
GLOBULIN UR ELPH-MCNC: 3.7 GM/DL
GLUCOSE BLDC GLUCOMTR-MCNC: 135 MG/DL (ref 70–105)
GLUCOSE BLDC GLUCOMTR-MCNC: 151 MG/DL (ref 70–105)
GLUCOSE BLDC GLUCOMTR-MCNC: 185 MG/DL (ref 70–105)
GLUCOSE BLDC GLUCOMTR-MCNC: 83 MG/DL (ref 70–105)
GLUCOSE SERPL-MCNC: 79 MG/DL (ref 65–99)
GRAM STN SPEC: ABNORMAL
HCT VFR BLD AUTO: 29.2 % (ref 37.5–51)
HGB BLD-MCNC: 9.6 G/DL (ref 13–17.7)
ISOLATED FROM: ABNORMAL
ISOLATED FROM: ABNORMAL
MCH RBC QN AUTO: 29.5 PG (ref 26.6–33)
MCHC RBC AUTO-ENTMCNC: 32.9 G/DL (ref 31.5–35.7)
MCV RBC AUTO: 89.7 FL (ref 79–97)
PLATELET # BLD AUTO: 115 10*3/MM3 (ref 140–450)
PMV BLD AUTO: 7.9 FL (ref 6–12)
POTASSIUM SERPL-SCNC: 5.1 MMOL/L (ref 3.5–5.2)
PROT SERPL-MCNC: 5.9 G/DL (ref 6–8.5)
RBC # BLD AUTO: 3.25 10*6/MM3 (ref 4.14–5.8)
SODIUM SERPL-SCNC: 133 MMOL/L (ref 136–145)
SODIUM UR-SCNC: <20 MMOL/L
TROPONIN T SERPL-MCNC: <0.01 NG/ML (ref 0–0.03)
WBC # BLD AUTO: 21.2 10*3/MM3 (ref 3.4–10.8)

## 2021-01-04 PROCEDURE — 85027 COMPLETE CBC AUTOMATED: CPT | Performed by: INTERNAL MEDICINE

## 2021-01-04 PROCEDURE — 97530 THERAPEUTIC ACTIVITIES: CPT

## 2021-01-04 PROCEDURE — 84300 ASSAY OF URINE SODIUM: CPT | Performed by: INTERNAL MEDICINE

## 2021-01-04 PROCEDURE — 82962 GLUCOSE BLOOD TEST: CPT

## 2021-01-04 PROCEDURE — 25010000002 MEROPENEM PER 100 MG: Performed by: INTERNAL MEDICINE

## 2021-01-04 PROCEDURE — 94799 UNLISTED PULMONARY SVC/PX: CPT

## 2021-01-04 PROCEDURE — 80053 COMPREHEN METABOLIC PANEL: CPT | Performed by: INTERNAL MEDICINE

## 2021-01-04 PROCEDURE — 99233 SBSQ HOSP IP/OBS HIGH 50: CPT | Performed by: INTERNAL MEDICINE

## 2021-01-04 PROCEDURE — 92610 EVALUATE SWALLOWING FUNCTION: CPT

## 2021-01-04 PROCEDURE — 84484 ASSAY OF TROPONIN QUANT: CPT | Performed by: INTERNAL MEDICINE

## 2021-01-04 PROCEDURE — 74176 CT ABD & PELVIS W/O CONTRAST: CPT

## 2021-01-04 PROCEDURE — 63710000001 INSULIN GLARGINE PER 5 UNITS: Performed by: NURSE PRACTITIONER

## 2021-01-04 PROCEDURE — 97112 NEUROMUSCULAR REEDUCATION: CPT

## 2021-01-04 PROCEDURE — 25010000002 ENOXAPARIN PER 10 MG: Performed by: INTERNAL MEDICINE

## 2021-01-04 RX ADMIN — Medication 10 ML: at 10:58

## 2021-01-04 RX ADMIN — ENOXAPARIN SODIUM 30 MG: 30 INJECTION SUBCUTANEOUS at 15:31

## 2021-01-04 RX ADMIN — POLYETHYLENE GLYCOL 3350 17 G: 17 POWDER, FOR SOLUTION ORAL at 20:01

## 2021-01-04 RX ADMIN — MEROPENEM 500 MG: 500 INJECTION, POWDER, FOR SOLUTION INTRAVENOUS at 20:01

## 2021-01-04 RX ADMIN — Medication 10 ML: at 20:01

## 2021-01-04 RX ADMIN — DIVALPROEX SODIUM 500 MG: 500 TABLET, DELAYED RELEASE ORAL at 20:01

## 2021-01-04 RX ADMIN — MIRTAZAPINE 7.5 MG: 7.5 TABLET ORAL at 20:01

## 2021-01-04 RX ADMIN — INSULIN GLARGINE 18 UNITS: 100 INJECTION, SOLUTION SUBCUTANEOUS at 20:02

## 2021-01-04 RX ADMIN — DIVALPROEX SODIUM 250 MG: 250 TABLET, DELAYED RELEASE ORAL at 10:58

## 2021-01-04 RX ADMIN — POLYETHYLENE GLYCOL 3350 17 G: 17 POWDER, FOR SOLUTION ORAL at 10:59

## 2021-01-04 RX ADMIN — QUETIAPINE FUMARATE 50 MG: 25 TABLET ORAL at 20:01

## 2021-01-04 RX ADMIN — DIVALPROEX SODIUM 125 MG: 125 TABLET, DELAYED RELEASE ORAL at 15:31

## 2021-01-04 RX ADMIN — ATENOLOL 50 MG: 50 TABLET ORAL at 10:57

## 2021-01-04 RX ADMIN — Medication 10 MG: at 20:01

## 2021-01-04 RX ADMIN — MEROPENEM 500 MG: 500 INJECTION, POWDER, FOR SOLUTION INTRAVENOUS at 11:01

## 2021-01-04 NOTE — PLAN OF CARE
Problem: Adult Inpatient Plan of Care  Goal: Plan of Care Review  Flowsheets (Taken 1/4/2021 1307)  Plan of Care Reviewed With:   nursing staff, pt's sister   patient      65-year-old male admitted from [facility] after a fall. CXR with no acute findings. ST was consulted due to nursing staff reports of coughing while eating.     Clinical swallow exam conducted with water, applesauce, fig ayala, & saltine cracker.  Reduced swallow safety with impulsive behaviors, consuming ~8 oz water by straw via multiple sequential drinks & taking large bites including consuming cracker in single bolus. Pt demonstrated coughing at end of exam, however no s/s aspiration correlated with PO trials.  Oral phase marked by stasis throughout oral cavity with both soft & regular solids which is cleared by multiple spontaneous swallows.     Recommend mechanical soft diet with thin liquids, tray set up & supervise during first meal. Medications in puree. Ensure full HOB elevation during & for 30 minutes following all PO. ST will follow to ensure tolerance of least restrictive diet and for further recs as indicated.

## 2021-01-04 NOTE — THERAPY TREATMENT NOTE
Patient Name: Ronald Reyer  : 1955    MRN: [id number]                              Today's Date: 2021       Admit Date: 2021    Visit Dx:     ICD-10-CM ICD-9-CM   1. Fall, initial encounter  W19.XXXA E888.9   2. Weakness  R53.1 780.79   3. Acute kidney injury (CMS/HCC)  N17.9 584.9   4. Urinary tract infection with hematuria, site unspecified  N39.0 599.0    R31.9 599.70     Patient Active Problem List   Diagnosis   • Fall   • SRAVAN (acute kidney injury) (CMS/[facility])   • Stage 3b chronic kidney disease   • UTI (urinary tract infection), bacterial   • Polypharmacy   • E coli bacteremia   • Dyspnea     Past Medical History:   Diagnosis Date   • Antisocial personality disorder (CMS/[facility])    • Bipolar disorder (CMS/[facility])    • Chronic kidney disease    • Convulsions (CMS/[facility])    • Diabetes mellitus (CMS/[facility])    • Hypertension    • Insomnia    • Renal disorder    • Schizophrenia (CMS/[facility])      History reviewed. No pertinent surgical history.  General Information     Row Name 21          Physical Therapy Time and Intention    Document Type  therapy note (daily note)  -SC     Mode of Treatment  individual therapy;physical therapy  -SC     Row Name 21          Cognition    Orientation Status (Cognition)  oriented to;person;place pt has poor speech and talks softly and difficult to understand  -SC     Row Name 21          Safety Issues, Functional Mobility    Impairments Affecting Function (Mobility)  balance;cognition;endurance/activity tolerance;strength  -SC     Cognitive Impairments, Mobility Safety/Performance  attention;insight into deficits/self-awareness;judgment;safety precaution awareness  -SC       User Key  (r) = Recorded By, (t) = Taken By, (c) = Cosigned By    Initials Name Provider Type    SC Apple Nguyen PTA Physical Therapy Assistant        Mobility     Row Name 21          Bed Mobility    Bed Mobility  rolling right;supine-sit  -SC     Rolling Left  Powell (Bed Mobility)  moderate assist (50% patient effort);1 person to manage equipment  -SC     Rolling Right Powell (Bed Mobility)  moderate assist (50% patient effort);2 person assist  -SC     Supine-Sit Powell (Bed Mobility)  moderate assist (50% patient effort);2 person assist  -SC     Assistive Device (Bed Mobility)  bed rails;draw sheet  -SC     Row Name 01/04/21 1842          Transfers    Comment (Transfers)  sit to stand from eob 2 times.  pt able to stand for about 30 seconds first time.  pt stood again and then was able to take a few short side steps up to hob.  knees buckle  -SC     Row Name 01/04/21 1842          Sit-Stand Transfer    Sit-Stand Powell (Transfers)  minimum assist (75% patient effort);2 person assist  -SC     Assistive Device (Sit-Stand Transfers)  walker, front-wheeled  -SC     Row Name 01/04/21 1842          Gait/Stairs (Locomotion)    Powell Level (Gait)  minimum assist (75% patient effort);2 person assist  -SC     Assistive Device (Gait)  walker, front-wheeled  -SC     Distance in Feet (Gait)  sit steps up to hob.  2'  -SC     Deviations/Abnormal Patterns (Gait)  festinating/shuffling;gait speed decreased;weight shifting decreased;base of support, narrow  -SC     Bilateral Gait Deviations  knee buckling, left side;knee buckling, right side  -SC       User Key  (r) = Recorded By, (t) = Taken By, (c) = Cosigned By    Initials Name Provider Type    SC Apple Nguyen PTA Physical Therapy Assistant        Obj/Interventions     Row Name 01/04/21 1845          Balance    Balance Assessment  sitting static balance;sitting dynamic balance;standing static balance;standing dynamic balance  -SC     Static Sitting Balance  sitting, edge of bed;mild impairment  -SC     Dynamic Sitting Balance  mild impairment;sitting, edge of bed  -SC     Static Standing Balance  mild impairment;supported;standing with roll walkelr  -SC     Dynamic Standing Balance  moderate  impairment;supported;standing with roll walker  -SC       User Key  (r) = Recorded By, (t) = Taken By, (c) = Cosigned By    Initials Name Provider Type    SC Apple Nguyen PTA Physical Therapy Assistant        Goals/Plan     Row Name 01/04/21 1848          Bed Mobility Goal 1 (PT)    Progress/Outcomes (Bed Mobility Goal 1, PT)  goal [facility]     Row Name 01/04/21 1848          Transfer Goal 1 (PT)    Progress/Outcome (Transfer Goal 1, PT)  goal [facility]     Row Name 01/04/21 1848          Gait Training Goal 1 (PT)    Progress/Outcome (Gait Training Goal 1, PT)  goal [facility]     Row Name 01/04/21 1848          Patient Education Goal (PT)    Progress/Outcome (Patient Education Goal, PT)  goal [facility]       User Key  (r) = Recorded By, (t) = Taken By, (c) = Cosigned By    Initials Name Provider Type    SC Apple Nguyen, LUIS Physical Therapy Assistant        Clinical Impression     Row Name 01/04/21 1846          Pain Scale: FACES Pre/Post-Treatment    Pain: FACES Scale, Pretreatment  0-->no hurt  -SC     Posttreatment Pain Rating  0-->no hurt  -SC     Pre/Posttreatment Pain Comment  no pain reported during tx session  -SC     Row Name 01/04/21 1846          Therapy Assessment/Plan (PT)    Rehab Potential (PT)  fair, will monitor progress closely  -SC     Row Name 01/04/21 1846          Vital Signs    O2 Delivery Pre Treatment  room air  -SC     O2 Delivery Intra Treatment  room air  -SC     O2 Delivery Post Treatment  room air  -SC     Recovery Time  nursing reported pt transfered to U yesterday due to pulmonary issues but has weaned him off 02 again today  -SC     Row Name 01/04/21 1846          Positioning and Restraints    Pre-Treatment Position  in bed  -SC     Post Treatment Position  bed  -SC     In Bed  notified nsg;supine;fowlers;call light within reach;exit alarm on  -SC       User Key  (r) = Recorded By, (t) = Taken By, (c) = Cosigned By    Initials Name Provider Type    SC  Apple Nguyen PTA Physical Therapy Assistant        Outcome Measures     Row Name 01/04/21 1848          How much help from another person do you currently need...    Turning from your back to your side while in flat bed without using bedrails?  2  -SC     Moving from lying on back to sitting on the side of a flat bed without bedrails?  2  -SC     Moving to and from a bed to a chair (including a wheelchair)?  2  -SC     Standing up from a chair using your arms (e.g., wheelchair, bedside chair)?  2  -SC     Climbing 3-5 steps with a railing?  1  -SC     To walk in hospital room?  2  -SC     AM-PAC 6 Clicks Score (PT)  11  -SC     Row Name 01/04/21 1848          Modified Krebs Scale    Modified Helene Scale  4 - Moderately severe disability.  Unable to walk without assistance, and unable to attend to own bodily needs without assistance.  -SC     Row Name 01/04/21 1848          Functional Assessment    Outcome Measure Options  AM-PAC 6 Clicks Basic Mobility (PT)  -SC       User Key  (r) = Recorded By, (t) = Taken By, (c) = Cosigned By    Initials Name Provider Type    SC Apple Nguyen PTA Physical Therapy Assistant        Physical Therapy Education                 Title: PT OT SLP Therapies (Done)     Topic: Physical Therapy (Done)     Point: Mobility training (Done)     Learning Progress Summary           Patient Acceptance, E, NR,Bed IU by SC at 1/4/2021 1849    Acceptance, E,TB, VU,NR,DU by  at 1/2/2021 1426                   Point: Home exercise program (Done)     Learning Progress Summary           Patient Acceptance, E, NR,Bed IU by SC at 1/4/2021 1849    Acceptance, E,TB, VU,NR,DU by  at 1/2/2021 1426                   Point: Body mechanics (Done)     Learning Progress Summary           Patient Acceptance, E, NR,Bed IU by SC at 1/4/2021 1849    Acceptance, E,TB, VU,NR,DU by  at 1/2/2021 1426                   Point: Precautions (Done)     Learning Progress Summary           Patient Acceptance,  E, NR,Bed IU by SC at 1/4/2021 1849    Acceptance, E,TB, VU,NR,DU by  at 1/2/2021 1426                               User Key     Initials Effective Dates Name Provider Type Discipline    SC 03/01/19 -  Apple Nguyen PTA Physical Therapy Assistant PT     01/07/20 -  Eloisa Nava PT Physical Therapist PT              PT Recommendation and Plan     Plan of Care Reviewed With: patient  Progress: improving  Outcome Summary: pt was cooperative with PT today and was following direction well.  pt required moderate assist of 2 for supine to sit to supine.  pt has difficulty with inital sitting balance leaning posterior.   pt was able to stand 2 times off eob and stood briefly.  pt took a few short steps up toward hob with walker and min assist of 2.   pt is far from his PLOF but potential cont to recover.  Recommend IP rehab at d/c.  PPE used:  gown, mask, glasses and gloves     Time Calculation:   PT Charges     Row Name 01/04/21 1853             Time Calculation    Start Time  1526  -SC      Stop Time  1550  -SC      Time Calculation (min)  24 min  -SC      PT Received On  01/04/21  -SC      PT - Next Appointment  01/05/21  -SC         Time Calculation- PT    Total Timed Code Minutes- PT  24 minute(s)  -SC         Timed Charges    67177 -  PT Neuromuscular Reeducation Minutes  8  -SC      69079 - PT Therapeutic Activity Minutes  16  -SC        User Key  (r) = Recorded By, (t) = Taken By, (c) = Cosigned By    Initials Name Provider Type    SC Apple Nguyen PTA Physical Therapy Assistant        Therapy Charges for Today     Code Description Service Date Service Provider Modifiers Qty    55648620516 HC PT NEUROMUSC RE EDUCATION EA 15 MIN 1/4/2021 Apple Nguyen, PTA GP 1    81323861007 HC PT THERAPEUTIC ACT EA 15 MIN 1/4/2021 Apple Nguyen PTA GP 1          PT G-Codes  Outcome Measure Options: AM-PAC 6 Clicks Basic Mobility (PT)  AM-PAC 6 Clicks Score (PT): 11  Modified [place]lton Scale: 4 - Moderately  severe disability.  Unable to walk without assistance, and unable to attend to own bodily needs without assistance.    Apple Nguyen, PTA  1/4/2021

## 2021-01-04 NOTE — DISCHARGE PLACEMENT REQUEST
"Reyer, Ronald (65 y.o. Male)     Date of Birth Social Security Number Address Home Phone MRN    1955  [facility]  [address]  [place] [address] [phone] [id number]    [employer]abian[employer] Marital Status          None        Admission Date Admission Type Admitting Provider Attending Provider Department, Room/Bed    1/1/21 Emergency Davide Mills MD Gill, Ravi, MD [facility], 2116/1    Discharge Date Discharge Disposition Discharge Destination                       Attending Provider: Davide Mills MD    Allergies: No Known Allergies    Isolation: Contact   Infection: ESBL E coli (01/03/21)   Code Status: CPR    Ht: 182.9 cm (72\")   Wt: 88.8 kg (195 lb 12.3 oz)    Admission Cmt: None   Principal Problem: E coli bacteremia [R78.81,B96.20]                 Active Insurance as of 1/1/2021     Primary Coverage     Payor Plan Insurance Group Employer/Plan Group    [place]      Payor Plan Address Payor Plan Phone Number Payor Plan Fax Number Effective Dates    [address]   12/8/2019 - None Entered    [place] [address]       Subscriber Name Subscriber Birth Date Member ID       REYER,RONALD 1955 [id number]                 Emergency Contacts      (Rel.) Home Phone Work Phone Mobile Phone    ROZ DILLARD (Sister) [phone] -- [phone]        JENNY Beckwith    Phone: [phone]  Cell: [phone]  Fax: [phone]  [email or web address]    "

## 2021-01-04 NOTE — PROGRESS NOTES
[facility] Medicine Services  INPATIENT PROGRESS NOTE  2116/1   Hospitalist Team  LOS 3 days      Patient Care Team:  Provider, No Known as PCP - Vincent Yao MD as Consulting Physician (Hospitalist)      Patient was examined with relevant and adequate PPE keeping in mind the current coronavirus pandemic. Minimum of 10 minutes to don and doff PPE.    Chief Complaint / Subjective  Chief Complaint   Patient presents with   • Fall       HPI (4 hpi elements or status of 3 chronic)     65-year-old male presents the ER with a chief complaint of left elbow pain after being found on the floor at the [facility] at the [facility] today.  The patient reports he does not remember falling.  He states he probably just tripped.  The patient told me that he has only been at the [facility] at the [facility] for approximately a week.  However, with review of records it looks that the patient is a long-term [facility] at the [facility] resident.    Patient unable to provide any history drowsy.    No change.  Still nonverbal mostly    Unsure of the baseline.  Patient agitated in the daytime otherwise answers in monosyllables right now 01/04/21, 6:13 PM EST         Fall          History taken from: chart    Review of Systems   Unable to perform ROS: dementia             Family History   Problem Relation Age of Onset   • No Known Problems Mother    • No Known Problems Father        Past Medical History:   Diagnosis Date   • Antisocial personality disorder (CMS/HCC)    • Bipolar disorder (CMS/HCC)    • Chronic kidney disease    • Convulsions (CMS/HCC)    • Diabetes mellitus (CMS/HCC)    • Hypertension    • Insomnia    • Renal disorder    • Schizophrenia (CMS/HCC)        Social History     Socioeconomic History   • Marital status:      Spouse name: Not on file   • Number of children: Not on file   • Years of education: Not on file   • Highest education level: Not on file   Tobacco Use   • Smoking status: Current Every Day Smoker     Packs/day: 0.50     Types: Cigarettes   •  Smokeless tobacco: Never Used   Substance and Sexual Activity   • Alcohol use: Never     Frequency: Never   • Drug use: Never   • Sexual activity: Defer       Prior to Admission medications    Medication Sig Start Date End Date Taking? Authorizing Provider   acetaminophen (TYLENOL) 325 MG tablet Take 650 mg by mouth Every 4 (Four) Hours As Needed for Mild Pain  or Fever.   Yes Jesus Bowser MD   atenolol (TENORMIN) 50 MG tablet Take 50 mg by mouth Daily.   Yes Jesus Bowser MD   divalproex (DEPAKOTE) 125 MG DR tablet Take 125 mg by mouth Daily.   Yes Jesus Bowser MD   divalproex (DEPAKOTE) 250 MG DR tablet Take 250 mg by mouth Daily.   Yes Jesus Bowser MD   divalproex (DEPAKOTE) 500 MG DR tablet Take 500 mg by mouth Every Night.   Yes Jesus Bowser MD   fluPHENAZine (PROLIXIN) 1 MG tablet Take 1 mg by mouth 3 (Three) Times a Day.   Yes Jesus Bowser MD   glucagon (GlucaGen HypoKit) 1 MG injection Infuse 1 mg into a venous catheter 2 (Two) Times a Day As Needed for Low Blood Sugar.   Yes Jesus Bowser MD   ibuprofen (ADVIL,MOTRIN) 800 MG tablet Take 800 mg by mouth Every 8 (Eight) Hours As Needed for Mild Pain .   Yes Jesus Bowser MD   insulin glargine (LANTUS, SEMGLEE) 100 UNIT/ML injection Inject 20 Units under the skin into the appropriate area as directed Every Night.   Yes Jesus Bowser MD   lisinopril (PRINIVIL,ZESTRIL) 5 MG tablet Take 5 mg by mouth Daily.   Yes Jesus Bowser MD   melatonin 5 MG tablet tablet Take 10 mg by mouth Every Night.   Yes Jesus Bowser MD   Menthol, Topical Analgesic, (Biofreeze) 4 % gel Apply  topically 2 (Two) Times a Day As Needed. BL Feet legs back and hips   Yes Jesus Bowser MD   mirtazapine (REMERON) 7.5 MG tablet Take 7.5 mg by mouth Every Night.   Yes Jesus Bowser MD   Prenatal Vit-Fe Fumarate-FA (M-Vit) tablet Take 1 tablet by mouth Daily.   Yes Jesus Bowser  "MD   promethazine (PHENERGAN) 12.5 MG tablet Take 12.5 mg by mouth Every 6 (Six) Hours As Needed for Nausea or Vomiting.   Yes Provider, MD Jesus   QUEtiapine (SEROquel) 100 MG tablet Take 100 mg by mouth Every Night.   Yes Provider, Jesus, MD        Objective    Physical Exam     Vital Signs  Temp:  [97.4 °F (36.3 °C)-102 °F (38.9 °C)] 98.5 °F (36.9 °C)  Heart Rate:  [66-81] 75  Resp:  [16-34] 18  BP: (134-159)/(59-83) 159/81    Oxygen Therapy  SpO2: 98 %  Pulse Oximetry Type: Continuous  Device (Oxygen Therapy): room air  Flow (L/min): 4  Flowsheet Rows      First Filed Value   Admission Height  182.9 cm (72\") Documented at 01/01/2021 1711   Admission Weight  90.3 kg (199 lb) Documented at 01/01/2021 1711        Weight change:    Intake & Output (last 3 days)       01/01 0701 - 01/02 0700 01/02 0701 - 01/03 0700 01/03 0701 - 01/04 0700 01/04 0701 - 01/05 0700    P.O. 240 1490 960 640    IV Piggyback 1000       Total Intake(mL/kg) 1240 (14.4) 1490 (17.3) 960 (10.8) 640 (7.2)    Urine (mL/kg/hr)  525 (0.3) 175 (0.1) 125 (0.1)    Stool  0  0    Total Output  525 175 125    Net +1240 +965 +785 +515            Urine Unmeasured Occurrence  3 x 4 x     Stool Unmeasured Occurrence  1 x  1 x        Lines, Drains & Airways    Active LDAs     Name:   Placement date:   Placement time:   Site:   Days:    Peripheral IV 01/02/21 0100 Anterior;Proximal;Right;Upper Arm   01/02/21    0100    Arm   less than 1                Physical Exam:    Physical Exam  Vitals signs and nursing note reviewed.   Constitutional:       General: He is not in acute distress.     Appearance: Normal appearance. He is well-developed. He is not ill-appearing, toxic-appearing or diaphoretic.   HENT:      Head: Normocephalic and atraumatic.      Right Ear: Ear canal and external ear normal.      Left Ear: Ear canal and external ear normal.      Nose: Nose normal. No congestion or rhinorrhea.      Mouth/Throat:      Mouth: Mucous membranes are " dry.      Pharynx: No oropharyngeal exudate.   Eyes:      General: No scleral icterus.        Right eye: No discharge.         Left eye: No discharge.      Extraocular Movements: Extraocular movements intact.      Conjunctiva/sclera: Conjunctivae normal.      Pupils: Pupils are equal, round, and reactive to light.   Neck:      Musculoskeletal: Normal range of motion and neck supple. No neck rigidity or muscular tenderness.      Thyroid: No thyromegaly.      Vascular: No carotid bruit or JVD.      Trachea: No tracheal deviation.   Cardiovascular:      Rate and Rhythm: Normal rate and regular rhythm.      Pulses: Normal pulses.      Heart sounds: Normal heart sounds. No murmur. No friction rub. No gallop.    Pulmonary:      Effort: Pulmonary effort is normal. No respiratory distress.      Breath sounds: No stridor. No wheezing, rhonchi or rales.   Chest:      Chest wall: No tenderness.   Abdominal:      General: Bowel sounds are normal. There is no distension.      Palpations: Abdomen is soft. There is no mass.      Tenderness: There is no abdominal tenderness. There is no guarding or rebound.      Hernia: No hernia is present.   Musculoskeletal: Normal range of motion.         General: No swelling, tenderness, deformity or signs of injury.      Right lower leg: No edema.      Left lower leg: No edema.   Lymphadenopathy:      Cervical: No cervical adenopathy.   Skin:     General: Skin is warm and dry.      Coloration: Skin is not jaundiced or pale.      Findings: No bruising, erythema or rash.   Neurological:      General: No focal deficit present.      Mental Status: He is alert. Mental status is at baseline.      Cranial Nerves: No cranial nerve deficit.      Sensory: No sensory deficit.      Motor: No weakness or abnormal muscle tone.      Coordination: Coordination normal.               PT Recommendation and Plan             Procedures:    * No surgery found *     Assessment/Plan with Problem wise       Active  Hospital Problems    Diagnosis  POA   • **E coli bacteremia [R78.81, B96.20]  Yes     Priority: High   • Dyspnea [R06.00]  Unknown   • SRAVAN (acute kidney injury) (CMS/[facility]) [N17.9]  Yes   • Stage 3b chronic kidney disease [N18.32]  Yes   • UTI (urinary tract infection), bacterial [N39.0, A49.9]  Yes   • Polypharmacy [Z79.899]  Not Applicable   • Fall [W19.XXXA]  Yes      Resolved Hospital Problems   No resolved problems to display.        Estimated Creatinine Clearance: 27.8 mL/min (A) (by C-G formula based on SCr of 3.41 mg/dL (H)).    Code Status and Medical Interventions:   Ordered at: 01/01/21 2129     Code Status:    CPR     Medical Interventions (Level of Support Prior to Arrest):    Full       MEDICAL DECISION MAKING COMPLEXITY BY PROBLEM:     E. coli bacteremia  Secondary to UTI  Meropenem    UTI:  Cultures  Empiric antibiotics till cultures finalize  Remove Mclain when not needed  Screening urinalysis should not be done and is not recommended by any professional society or organization.  Does not achieve any purpose except antibiotic resistance and adverse effects like C. difficile colitis      Renal failure/insufficiency/injury:  Hydration  Supportive treatment  Cut down or hold ACE inhibitors  Avoid nephrotoxic medications   Address diuretics      Polypharmacy probably contributing to his current condition and constipation.  And impaired intake.    Care coordination with nursing 01/02/21 10:15 AM EST.    Care coordination with nursing and pharmacy regarding  his antibiotics    Stable.  Continue antibiotics for 14 days discharge planning 1 to 2 days    Plan for disposition:            Continued Care and Services - Admitted Since 1/1/2021    Coordination has not been started for this encounter.        Historical & Objective Data     Results Review:    I reviewed the patient's new lab and radiology results. 01/04/21     Results from last 7 days   Lab Units 01/04/21  0325 01/03/21  1752 01/03/21  9496  01/03/21  1217 01/01/21  1733   WBC 10*3/mm3 21.20* 18.50*  --  16.70* 15.30*   HEMOGLOBIN g/dL 9.6* 10.7*  --  9.1* 10.4*   HEMOGLOBIN, POC g/dL  --   --  10.4*  --   --    HEMATOCRIT % 29.2* 32.0*  --  27.7* 31.3*   HEMATOCRIT POC %  --   --  30*  --   --    MCV fL 89.7 89.9  --  90.5 89.1   MCH pg 29.5 30.0  --  29.6 29.6   MPV fL 7.9 7.6  --  7.9 7.5   RDW % 16.3* 16.4*  --  16.4* 16.2*   PLATELETS 10*3/mm3 115* 126*  --  109* 162   MONOCYTES % %  --   --   --   --  17.0*     Results from last 7 days   Lab Units 01/04/21  0324 01/03/21  1752 01/03/21  1217 01/01/21  1733   SODIUM mmol/L 133* 132*  --  133*   POTASSIUM mmol/L 5.1 5.4* 5.2 4.7   CHLORIDE mmol/L 103 99 101 99   CO2 mmol/L 20.0* 24.0 21.0* 24.0   BUN mg/dL 69* 66* 66* 35*   CREATININE mg/dL 3.41* 3.44* 3.26* 2.24*   CALCIUM mg/dL 8.7 8.7 8.4* 8.9   MAGNESIUM mg/dL  --  1.9  --   --    BILIRUBIN mg/dL 0.4 0.3  --  0.4   ALK PHOS U/L 274* 307*  --  100   ALT (SGPT) U/L 20 22  --  12   AST (SGOT) U/L 34 37  --  22   GLUCOSE mg/dL 79 107* 145* 115*     Inflammatory Biomarkers        Invalid input(s): ESR, D-DIMER QUANTITATIVE,  PROCALCITONIN,    Lab Results   Component Value Date    PHOS 5.3 (H) 01/03/2021     No results found for: HGBA1C  No results found for: CHOL, CHLPL, TRIG, HDL, LDL, LDLDIRECT  No results found for: LIPASE        Results from last 7 days   Lab Units 01/03/21  1717   PH, ARTERIAL pH units 7.360   PO2 ART mm Hg 131.3*   PCO2, ARTERIAL mm Hg 37.7   HCO3 ART mmol/L 21.3     Pathology  No results found for: INTRAOP, PREDX, FINALDX, COMDX  COVID19   Date Value Ref Range Status   01/01/2021 Presumptive Negative Presumptive Negative - Ref. Range Final        Microbiology Results (last 10 days)     Procedure Component Value - Date/Time    Blood Culture - Blood, Arm, Left [[id number]]  (Abnormal) Collected: 01/01/21 2255    Lab Status: Final result Specimen: Blood from Arm, Left Updated: 01/04/21 1031     Blood Culture Escherichia coli ESBL      Comment: Consider infectious disease consult.  Susceptibility results may not correlate to clinical outcomes.  For ESBL-producing infections in the blood, a carbapenem is recommended as first-line therapy for optimal clinical outcomes.        Isolated from Aerobic and Anaerobic Bottles     Gram Stain Anaerobic Bottle Gram negative bacilli      Aerobic Bottle Gram negative bacilli    Narrative:      Refer to previous blood culture collected on 1/1/2021 2255 for MICs.    Blood Culture - Blood, Hand, Left [[id number]]  (Abnormal)  (Susceptibility) Collected: 01/01/21 2255    Lab Status: Final result Specimen: Blood from Hand, Left Updated: 01/04/21 1030     Blood Culture Escherichia coli ESBL     Comment: Consider infectious disease consult.  Susceptibility results may not correlate to clinical outcomes.  For ESBL-producing infections in the blood, a carbapenem is recommended as first-line therapy for optimal clinical outcomes.        Isolated from Aerobic and Anaerobic Bottles     Gram Stain Anaerobic Bottle Gram negative bacilli      Aerobic Bottle Gram negative bacilli    Susceptibility      Escherichia coli ESBL     ANA     Ertapenem Susceptible     Meropenem Susceptible                Susceptibility Comments     Escherichia coli ESBL    Cefazolin sensitivity will not be reported for Enterobacteriaceae in non-urine isolates. If cefazolin is preferred, please call the microbiology lab to request an E-test.  With the exception of urinary-sourced infections, aminoglycosides should not be used as monotherapy.             Blood Culture ID, PCR - Blood, Hand, Left [[id number]]  (Abnormal) Collected: 01/01/21 2255    Lab Status: Final result Specimen: Blood from Hand, Left Updated: 01/02/21 1047     BCID, PCR Escherichia coli. Identification by BCID PCR.     BOTTLE TYPE Anaerobic Bottle    COVID-19, ABBOTT IN-HOUSE,NASAL Swab (NO TRANSPORT MEDIA) 2 HR TAT - Swab, Nasopharynx [[id number]]  (Normal) Collected: 01/01/21  2013    Lab Status: Final result Specimen: Swab from Nasopharynx Updated: 01/01/21 2037     COVID19 Presumptive Negative    Narrative:      Fact sheet for providers: https://www.fda.gov/media/714521/download     Fact sheet for patients: https://www.fda.gov/media/173869/download    Test performed by PCR.  If inconsistent with clinical signs and symptoms patient should be tested with different authorized molecular test.    Urine Culture - Urine, Urine, Catheter [[id number]]  (Abnormal)  (Susceptibility) Collected: 01/01/21 1852    Lab Status: Final result Specimen: Urine, Catheter Updated: 01/03/21 0208     Urine Culture >100,000 CFU/mL Escherichia coli ESBL     Comment: Consider infectious disease consult.  Susceptibility results may not correlate to clinical outcomes.       Susceptibility      Escherichia coli ESBL     ANA     Amikacin Susceptible     Ertapenem Susceptible     Gentamicin Resistant     Levofloxacin Resistant     Meropenem Susceptible     Nitrofurantoin Susceptible     Piperacillin + Tazobactam Susceptible     Tetracycline Resistant     Tobramycin Resistant     Trimethoprim + Sulfamethoxazole Resistant                          ECG/EMG Results (most recent)     Procedure Component Value Units Date/Time    ECG 12 Lead [[id number]] Collected: 01/01/21 1728     Updated: 01/01/21 1729     QT Interval 371 ms     Narrative:      HEART RATE= 87  bpm  RR Interval= 692  ms  GA Interval= 120  ms  P Horizontal Axis= 5  deg  P Front Axis= 54  deg  QRSD Interval= 95  ms  QT Interval= 371  ms  QRS Axis= 23  deg  T Wave Axis= 76  deg  - ABNORMAL ECG -  Sinus rhythm  ST elevation, consider anterior injury  Electronically Signed By:   Date and Time of Study: 2021-01-01 17:28:09                    Ct Head Without Contrast    Result Date: 1/1/2021   1. Motion artifact which limits the study, especially to the skull base. 2. No large parenchymal hemorrhage. 3. There appears to be volume loss secondary to atrophy.   Electronically Signed By-Erick Bautista MD On:1/1/2021 6:13 PM This report was finalized on [id number] by  Erick Bautista MD.    Ct Cervical Spine Without Contrast    Result Date: 1/1/2021   1. Limited study due to marked motion artifact at the C4-C6 level. It is difficult to exclude an acute fracture or dislocation in this area. 2. Degenerative spondylosis and facet arthritis. 3. Varying degrees of canal and neural foraminal stenosis which was better appreciated on the previous CT exam where no motion artifact was apparent.  Electronically Signed By-Erick Bautista MD On:1/1/2021 6:34 PM This report was finalized on [id number] by  Erick Bautista MD.    Xr Chest 1 View    Result Date: 1/3/2021   1. No acute cardiopulmonary disease.   Electronically Signed By-Gera Young MD On:1/3/2021 6:28 PM This report was finalized on [id number] by  Gera Young MD.    Xr Chest 1 View    Result Date: 1/1/2021  No active disease.  Electronically Signed By-Erick Bautista MD On:1/1/2021 5:59 PM This report was finalized on [id number] by  Erick Bautista MD.    Ct Abdomen Pelvis Stone Protocol    Result Date: 1/4/2021   1. There is a small volume of scattered ascites, mild body wall edema and a small right-sided pleural effusion, which would indicate an abnormal fluid status. Liver margins are lobulated, which could indicate cirrhosis. Correlate for the possibility of liver, kidney or heart failure 2. Bilateral perinephric stranding, which is nonspecific. There is mild thickening of the urinary bladder wall. This could be related to the patient's known urosepsis. 3. Mild osseous degenerative changes.  Electronically Signed By-Juanjo Pastor MD On:1/4/2021 2:27 PM This report was finalized on [id number] by  Juanjo Pastor MD.      I personally reviewed patient's x-ray films and my findings are: 0    I personally reviewed patient's EKG strip and my findings are: 0    Medication Review:   I have reviewed the patient's current  medication list 01/04/21     Scheduled Meds  atenolol, 50 mg, Oral, Daily  divalproex, 125 mg, Oral, Daily  divalproex, 250 mg, Oral, Daily  divalproex, 500 mg, Oral, Nightly  enoxaparin, 30 mg, Subcutaneous, Daily  insulin glargine, 18 Units, Subcutaneous, Nightly  insulin lispro, 0-7 Units, Subcutaneous, TID AC  melatonin, 10 mg, Oral, Nightly  meropenem, 500 mg, Intravenous, Q8H  mirtazapine, 7.5 mg, Oral, Nightly  polyethylene glycol, 17 g, Oral, BID  QUEtiapine, 50 mg, Oral, Nightly  sodium chloride, 10 mL, Intravenous, Q12H        Meds Infusions  sodium chloride, 125 mL/hr, Last Rate: 125 mL/hr (01/03/21 3767)        DVT prophylaxis  Mechanical Order History:     None      Pharmalogical Order History:      Ordered     Dose Route Frequency Stop    01/01/21 2127  enoxaparin (LOVENOX) syringe 40 mg      40 mg SC Daily --                Meds PRN  •  acetaminophen **OR** acetaminophen **OR** acetaminophen  •  dextrose  •  dextrose  •  glucagon (human recombinant)  •  insulin lispro **AND** insulin lispro  •  ipratropium-albuterol  •  ondansetron **OR** ondansetron  •  sodium chloride      Davide Mills MD  01/04/21  18:05 EST

## 2021-01-04 NOTE — PROGRESS NOTES
"   LOS: 3 days    Patient Care Team:  Provider, No Known as PCP - General  Vincent Cabezas MD as Consulting Physician (Hospitalist)      Subjective     Patient resting comfortably  Complaining of generalized aches and pains.  Some shortness of breath.  No chills, nausea or vomiting    Objective     Vital Sign Min/Max for last 24 hours  Temp:  [98.1 °F (36.7 °C)-102 °F (38.9 °C)] 99.7 °F (37.6 °C)  Heart Rate:  [66-88] 80  Resp:  [16-47] 16  BP: (133-165)/(59-83) 153/81                       Flowsheet Rows      First Filed Value   Admission Height  182.9 cm (72\") Documented at 01/01/2021 1711   Admission Weight  90.3 kg (199 lb) Documented at 01/01/2021 1711          No intake/output data recorded.  I/O last 3 completed shifts:  In: 1200 [P.O.:1200]  Out: 475 [Urine:475]    Physical Exam:  Physical Exam    General.  Awake, alert  Head.  Atraumatic, normocephalic  Neck.  Supple.  No JVD  ENT.  Oral mucosa dry  Respiratory.  Few scattered wheezes  Cardiovascular.  Regular rhythm  Abdominal.  Obese, soft, nontender  Extremities.  No edema     LABS:  Lab Results   Component Value Date    CALCIUM 8.7 01/04/2021    PHOS 5.3 (H) 01/03/2021     Results from last 7 days   Lab Units 01/04/21  0325 01/04/21  0324 01/03/21  1752 01/03/21  1717 01/03/21  1217 01/01/21  1733   MAGNESIUM mg/dL  --   --  1.9  --   --   --    SODIUM mmol/L  --  133* 132*  --   --  133*   POTASSIUM mmol/L  --  5.1 5.4*  --  5.2 4.7   CHLORIDE mmol/L  --  103 99  --  101 99   CO2 mmol/L  --  20.0* 24.0  --  21.0* 24.0   BUN mg/dL  --  69* 66*  --  66* 35*   CREATININE mg/dL  --  3.41* 3.44*  --  3.26* 2.24*   GLUCOSE mg/dL  --  79 107*  --  145* 115*   CALCIUM mg/dL  --  8.7 8.7  --  8.4* 8.9   WBC 10*3/mm3 21.20*  --  18.50*  --  16.70* 15.30*   HEMOGLOBIN g/dL 9.6*  --  10.7*  --  9.1* 10.4*   HEMOGLOBIN, POC g/dL  --   --   --  10.4*  --   --    PLATELETS 10*3/mm3 115*  --  126*  --  109* 162   ALT (SGPT) U/L  --  20 22  --   --  12   AST (SGOT) U/L "  --  34 37  --   --  22     Lab Results   Component Value Date    CKTOTAL 92 01/01/2021    TROPONINT <0.010 01/04/2021     Estimated Creatinine Clearance: 27.8 mL/min (A) (by C-G formula based on SCr of 3.41 mg/dL (H)).  Results from last 7 days   Lab Units 01/03/21  1717   PH, ARTERIAL pH units 7.360   PO2 ART mm Hg 131.3*   PCO2, ARTERIAL mm Hg 37.7   HCO3 ART mmol/L 21.3     Brief Urine Lab Results  (Last result in the past 365 days)      Color   Clarity   Blood   Leuk Est   Nitrite   Protein   CREAT   Urine HCG        01/01/21 1852 Yellow Turbid  Comment:  Result checked  Large (3+) Moderate (2+) Positive >=300 mg/dL (3+)             WEIGHTS:     Wt Readings from Last 1 Encounters:   01/04/21 0500 88.8 kg (195 lb 12.3 oz)   01/03/21 2346 88.8 kg (195 lb 12.3 oz)   01/01/21 2230 86.1 kg (189 lb 13.1 oz)   01/01/21 1711 90.3 kg (199 lb)       atenolol, 50 mg, Oral, Daily  divalproex, 125 mg, Oral, Daily  divalproex, 250 mg, Oral, Daily  divalproex, 500 mg, Oral, Nightly  enoxaparin, 30 mg, Subcutaneous, Daily  insulin glargine, 18 Units, Subcutaneous, Nightly  insulin lispro, 0-7 Units, Subcutaneous, TID AC  melatonin, 10 mg, Oral, Nightly  meropenem, 500 mg, Intravenous, Q8H  mirtazapine, 7.5 mg, Oral, Nightly  polyethylene glycol, 17 g, Oral, BID  QUEtiapine, 50 mg, Oral, Nightly  sodium chloride, 10 mL, Intravenous, Q12H      sodium chloride, 125 mL/hr, Last Rate: 125 mL/hr (01/03/21 1457)        Assessment/Plan       1.  Acute kidney injury  Nonoliguric.  Creatinine seems to be plateauing  Continue IV fluids.  Ordered urine sodium    2.  Hyperkalemia  Mild.  Improving    3.  E. coli UTI with bacteremia  On IV meropenem  Ordering CT abdomen with renal stone protocol    4.  Hypertension  Blood pressure fairly well controlled      Hermilo Wilson MD  01/04/21  08:24 EST

## 2021-01-04 NOTE — CONSULTS
PULMONARY/CRITICAL CARE CONSULT NOTE     PATIENT NAME:  Ronald Reyer       :  1955       MRN:  [id number]       ROOM:  [address]     PRIMARY CARE PROVIDER  Provider, No Known    REFERRING PROVIDER     Davide Mills MD    REASON FOR CONSULTATION  Dyspnea     SUBJECTIVE     CHIEF COMPLAINT:   Fall    HISTORY OF PRESENT ILLNESS:  Ronald Reyer is a 65 y.o. male  has a past medical history of Antisocial personality disorder (CMS/[facility]), Bipolar disorder (CMS/[facility]), Chronic kidney disease, Convulsions (CMS/[facility]), Diabetes mellitus (CMS/[facility]), Hypertension, Insomnia, Renal disorder, and Schizophrenia (CMS/[facility]).  Patient presented to the ED on 2021 for evaluation after a fall at the [facility] care facility where he resides.  He reported left elbow pain.  A CT head and neck were completed and both studies were limited due to motion artifact.  On the CT neck report it states difficulty excluding an acute fracture or dislocation at C4 - C6.  COVID-19 swab presumed negative.  Labs in the ED were abnormal for a creatinine of 2.24, BUN 35, WBC 15.30, hgb 10.4.  Urinalysis positive for infection with too numerous to count WBC and 2+ bacteria.  Urine culture is positive for E-coli.  Since admission the patients serum creatinine has continued to increase and is now 3.44.  Nephrology is consulted.  WBC has increased to 18.50.  Blood cultures are positive for E-coli and he is on Merrem.  On 1/3/21, a FAST call was made due to difficulty breathing.  The patient was placed on BIPAP.  CXR obtained showed no active disease.  ABG obtained showed a PH 7.360, PCO2 37.7, PO2 131.3, HCO3 21.3, base excess -3.7, and O2 saturation of 98.9%.                        Pulmonary consultation was requested for further evaluation and treatment of patient condition.      Thank you for this consultation, we will follow along on this patient.        REVIEW OF SYSTEMS:  Review of systems could not be obtained due to mental status.  Patient has  "schizophrenia and some confusion.      ASSESSMENT & PLAN     Principal Problem:    E coli bacteremia  Active Problems:    Fall    SRAVAN (acute kidney injury) (CMS/[facility])    Stage 3b chronic kidney disease    UTI (urinary tract infection), bacterial    Polypharmacy    Dyspnea       PLAN:  -CXR and ABG reviewed. COVID-19 presumed negative.  -cont with supplemental oxygen as needed for sats 92%.  Patient does not need BIPAP.  Ok for nasal cannula, 4 liters  -duo-nebs as needed   -IVFs per renal  -home medication per primary   -NPO, ST to see     Full Code     HOSPITAL MEDICATIONS     SCHEDULED MEDICATIONS:  atenolol, 50 mg, Oral, Daily  divalproex, 125 mg, Oral, Daily  divalproex, 250 mg, Oral, Daily  divalproex, 500 mg, Oral, Nightly  enoxaparin, 30 mg, Subcutaneous, Daily  insulin glargine, 18 Units, Subcutaneous, Nightly  insulin lispro, 0-7 Units, Subcutaneous, TID AC  melatonin, 10 mg, Oral, Nightly  meropenem, 500 mg, Intravenous, Q8H  mirtazapine, 7.5 mg, Oral, Nightly  polyethylene glycol, 17 g, Oral, BID  QUEtiapine, 50 mg, Oral, Nightly  sodium chloride, 10 mL, Intravenous, Q12H         CONTINUOUS INFUSIONS:    sodium chloride, 125 mL/hr, Last Rate: 125 mL/hr (01/03/21 1457)         PRN MEDICATIONS:   •  acetaminophen **OR** acetaminophen **OR** acetaminophen  •  dextrose  •  dextrose  •  glucagon (human recombinant)  •  insulin lispro **AND** insulin lispro  •  ondansetron **OR** ondansetron  •  sodium chloride       OBJECTIVE     VITAL SIGNS:  /80   Pulse 83   Temp (!) 102 °F (38.9 °C) (Axillary)   Resp (!) 47   Ht 182.9 cm (72\")   Wt 86.1 kg (189 lb 13.1 oz)   SpO2 100%   BMI 25.74 kg/m²     Wt Readings from Last 3 Encounters:   01/01/21 86.1 kg (189 lb 13.1 oz)   08/09/20 77.1 kg (170 lb)   02/29/20 78 kg (172 lb)       INTAKE/OUTPUT:    Intake/Output Summary (Last 24 hours) at 1/3/2021 1952  Last data filed at 1/3/2021 1300  Gross per 24 hour   Intake 1200 ml   Output 400 ml   Net 800 ml       "       PHYSICAL EXAM:   Constitutional:  Well developed, well nourished, no acute distress, non-toxic appearance   Eyes:  PERRL, conjunctiva normal, EOMI   HENT:  Atraumatic, external ears normal, nose normal, oropharynx moist, no pharyngeal exudates. Neck-normal range of motion, no tenderness, supple, trachea midline  Respiratory:  Good air movement bilaterally, no wheezing, non-labored respirations without accessory muscle use  Cardiovascular:  Normal rate, normal rhythm, no murmurs, no gallops, no rubs   GI:  Soft, nondistended, normal bowel sounds, nontender, no organomegaly, no mass, no rebound, no guarding   :  No costovertebral angle tenderness   Musculoskeletal:  No edema, no tenderness, no deformities  Integument:  Warm, dry   Lymphatic:  No lymphadenopathy noted   Neurologic:  Awake and alert, follows commands.  Answers simple questions.  Some confusion, normal sensory function, no focal deficits noted   Psychiatric:  calm      HISTORY     HISTORY:  Past Medical History:   Diagnosis Date   • Antisocial personality disorder (CMS/HCC)    • Bipolar disorder (CMS/HCC)    • Chronic kidney disease    • Convulsions (CMS/HCC)    • Diabetes mellitus (CMS/HCC)    • Hypertension    • Insomnia    • Renal disorder    • Schizophrenia (CMS/HCC)      History reviewed. No pertinent surgical history.  Family History   Problem Relation Age of Onset   • No Known Problems Mother    • No Known Problems Father      Social History     Socioeconomic History   • Marital status:      Spouse name: Not on file   • Number of children: Not on file   • Years of education: Not on file   • Highest education level: Not on file   Tobacco Use   • Smoking status: Current Every Day Smoker     Packs/day: 0.50     Types: Cigarettes   • Smokeless tobacco: Never Used   Substance and Sexual Activity   • Alcohol use: Never     Frequency: Never   • Drug use: Never   • Sexual activity: Defer        HOME MEDICATIONS:   Prior to Admission  medications    Medication Sig Start Date End Date Taking? Authorizing Provider   acetaminophen (TYLENOL) 325 MG tablet Take 650 mg by mouth Every 4 (Four) Hours As Needed for Mild Pain  or Fever.   Yes Jesus Bowser MD   atenolol (TENORMIN) 50 MG tablet Take 50 mg by mouth Daily.   Yes Jesus Bowser MD   divalproex (DEPAKOTE) 125 MG DR tablet Take 125 mg by mouth Daily.   Yes Jesus Bowser MD   divalproex (DEPAKOTE) 250 MG DR tablet Take 250 mg by mouth Daily.   Yes Jesus Bowser MD   divalproex (DEPAKOTE) 500 MG DR tablet Take 500 mg by mouth Every Night.   Yes Jesus Bowser MD   fluPHENAZine (PROLIXIN) 1 MG tablet Take 1 mg by mouth 3 (Three) Times a Day.   Yes Jesus Bowser MD   glucagon (GlucaGen HypoKit) 1 MG injection Infuse 1 mg into a venous catheter 2 (Two) Times a Day As Needed for Low Blood Sugar.   Yes Jesus Bowser MD   ibuprofen (ADVIL,MOTRIN) 800 MG tablet Take 800 mg by mouth Every 8 (Eight) Hours As Needed for Mild Pain .   Yes Jesus Bowser MD   insulin glargine (LANTUS, SEMGLEE) 100 UNIT/ML injection Inject 20 Units under the skin into the appropriate area as directed Every Night.   Yes Jesus Bowser MD   lisinopril (PRINIVIL,ZESTRIL) 5 MG tablet Take 5 mg by mouth Daily.   Yes Jesus Bowser MD   melatonin 5 MG tablet tablet Take 10 mg by mouth Every Night.   Yes Jesus Bowser MD   Menthol, Topical Analgesic, (Biofreeze) 4 % gel Apply  topically 2 (Two) Times a Day As Needed. BL Feet legs back and hips   Yes Jesus Bwoser MD   mirtazapine (REMERON) 7.5 MG tablet Take 7.5 mg by mouth Every Night.   Yes Jesus Bowser MD   Prenatal Vit-Fe Fumarate-FA (M-Vit) tablet Take 1 tablet by mouth Daily.   Yes Jesus Bowser MD   promethazine (PHENERGAN) 12.5 MG tablet Take 12.5 mg by mouth Every 6 (Six) Hours As Needed for Nausea or Vomiting.   Yes Jesus Bowser MD   QUEtiapine (SEROquel) 100  MG tablet Take 100 mg by mouth Every Night.   Yes Provider, MD Jesus       ALLERGIES:  Patient has no known allergies.      RESULTS     LABS:  Lab Results (last 24 hours)     Procedure Component Value Units Date/Time    POC Glucose Once [[id number]]  (Abnormal) Collected: 01/02/21 2009    Specimen: Blood Updated: 01/02/21 2010     Glucose 176 mg/dL      Comment: Serial Number: [id number]:  [id number]       POC Glucose Once [[id number]]  (Normal) Collected: 01/03/21 0728    Specimen: Blood Updated: 01/03/21 0729     Glucose 94 mg/dL      Comment: Serial Number: [id number]:  [id number]       POC Glucose Once [[id number]]  (Abnormal) Collected: 01/03/21 1144    Specimen: Blood Updated: 01/03/21 1149     Glucose 127 mg/dL      Comment: Serial Number: [id number]:  [id number]       Basic Metabolic Panel [[id number]]  (Abnormal) Collected: 01/03/21 1217    Specimen: Blood Updated: 01/03/21 1314     Glucose 145 mg/dL      BUN 66 mg/dL      Comment: Result checked         Creatinine 3.26 mg/dL      Potassium 5.2 mmol/L      Chloride 101 mmol/L      CO2 21.0 mmol/L      Calcium 8.4 mg/dL      eGFR Non African Amer 19 mL/min/1.73      BUN/Creatinine Ratio 20.2     Anion Gap 9.0 mmol/L     Narrative:      GFR Normal >60  Chronic Kidney Disease <60  Kidney Failure <15      CBC & Differential [[id number]]  (Abnormal) Collected: 01/03/21 1217    Specimen: Blood Updated: 01/03/21 1230    Narrative:      The following orders were created for panel order CBC & Differential.  Procedure                               Abnormality         Status                     ---------                               -----------         ------                     CBC Auto Differential[[id number]]        Abnormal            Final result                 Please view results for these tests on the individual orders.    CBC Auto Differential [[id number]]  (Abnormal) Collected: 01/03/21 1217    Specimen: Blood Updated: 01/03/21 1230     WBC  16.70 10*3/mm3      RBC 3.06 10*6/mm3      Hemoglobin 9.1 g/dL      Hematocrit 27.7 %      MCV 90.5 fL      MCH 29.6 pg      MCHC 32.7 g/dL      RDW 16.4 %      RDW-SD 52.5 fl      MPV 7.9 fL      Platelets 109 10*3/mm3      Neutrophil % 90.5 %      Lymphocyte % 3.7 %      Monocyte % 5.6 %      Eosinophil % 0.1 %      Basophil % 0.1 %      Neutrophils, Absolute 15.20 10*3/mm3      Lymphocytes, Absolute 0.60 10*3/mm3      Monocytes, Absolute 0.90 10*3/mm3      Eosinophils, Absolute 0.00 10*3/mm3      Basophils, Absolute 0.00 10*3/mm3      nRBC 0.1 /100 WBC     POC Glucose Once [[id number]]  (Normal) Collected: 01/03/21 1604    Specimen: Blood Updated: 01/03/21 1606     Glucose 104 mg/dL      Comment: Serial Number: [id number]:  [id number]       Blood Gas, Arterial - [[id number]]  (Abnormal) Collected: 01/03/21 1717    Specimen: Arterial Blood Updated: 01/03/21 1722     Site Right Radial     Ricardo's Test Positive     pH, Arterial 7.360 pH units      pCO2, Arterial 37.7 mm Hg      pO2, Arterial 131.3 mm Hg      HCO3, Arterial 21.3 mmol/L      Base Excess, Arterial -3.7 mmol/L      Comment: Serial Number: [id number]:  [id number]        O2 Saturation, Arterial 98.9 %      CO2 Content 22.5 mmol/L      Barometric Pressure for Blood Gas --     Comment: N/A        Modality Cannula     FIO2 32 %      Hemodilution No    POCT Electrolytes +HGB +HCT [[id number]]  (Abnormal) Collected: 01/03/21 1717    Specimen: Blood Updated: 01/03/21 1722     Sodium 132 mmol/L      POC Potassium 5.2 mmol/L      Ionized Calcium 0.99 mmol/L      Comment: Serial Number: [id number]:  [id number]        Glucose 103 mg/dL      Hematocrit 30 %      Hemoglobin 10.4 g/dL     POC Lactate [[id number]]  (Abnormal) Collected: 01/03/21 1717    Specimen: Blood Updated: 01/03/21 1722     Lactate 0.4 mmol/L      Comment: Serial Number: [id number]:  [id number]       POC Glucose Once [[id number]]  (Abnormal) Collected: 01/03/21 1717    Specimen: Blood Updated:  01/03/21 1722     Glucose 103 mg/dL      Comment: Serial Number: [id number]:  [id number]       Comprehensive Metabolic Panel [[id number]]  (Abnormal) Collected: 01/03/21 1752    Specimen: Blood from Arm, Left Updated: 01/03/21 1838     Glucose 107 mg/dL      BUN 66 mg/dL      Creatinine 3.44 mg/dL      Sodium 132 mmol/L      Potassium 5.4 mmol/L      Chloride 99 mmol/L      CO2 24.0 mmol/L      Calcium 8.7 mg/dL      Total Protein 6.8 g/dL      Albumin 2.50 g/dL      ALT (SGPT) 22 U/L      AST (SGOT) 37 U/L      Alkaline Phosphatase 307 U/L      Total Bilirubin 0.3 mg/dL      eGFR Non African Amer 18 mL/min/1.73      Globulin 4.3 gm/dL      A/G Ratio 0.6 g/dL      BUN/Creatinine Ratio 19.2     Anion Gap 9.0 mmol/L     Narrative:      GFR Normal >60  Chronic Kidney Disease <60  Kidney Failure <15      CBC & Differential [[id number]]  (Abnormal) Collected: 01/03/21 1752    Specimen: Blood from Arm, Left Updated: 01/03/21 1819    Narrative:      The following orders were created for panel order CBC & Differential.  Procedure                               Abnormality         Status                     ---------                               -----------         ------                     CBC Auto Differential[[id number]]        Abnormal            Final result                 Please view results for these tests on the individual orders.    Magnesium [[id number]]  (Normal) Collected: 01/03/21 1752    Specimen: Blood from Arm, Left Updated: 01/03/21 1834     Magnesium 1.9 mg/dL     Phosphorus [[id number]]  (Abnormal) Collected: 01/03/21 1752    Specimen: Blood from Arm, Left Updated: 01/03/21 1834     Phosphorus 5.3 mg/dL     CBC Auto Differential [[id number]]  (Abnormal) Collected: 01/03/21 1752    Specimen: Blood from Arm, Left Updated: 01/03/21 1819     WBC 18.50 10*3/mm3      RBC 3.56 10*6/mm3      Hemoglobin 10.7 g/dL      Hematocrit 32.0 %      MCV 89.9 fL      MCH 30.0 pg      MCHC 33.4 g/dL      RDW 16.4 %      RDW-SD  51.2 fl      MPV 7.6 fL      Platelets 126 10*3/mm3      Neutrophil % 93.5 %      Lymphocyte % 1.9 %      Monocyte % 4.4 %      Eosinophil % 0.1 %      Basophil % 0.1 %      Neutrophils, Absolute 17.30 10*3/mm3      Lymphocytes, Absolute 0.40 10*3/mm3      Monocytes, Absolute 0.80 10*3/mm3      Eosinophils, Absolute 0.00 10*3/mm3      Basophils, Absolute 0.00 10*3/mm3      nRBC 0.1 /100 WBC             MICRO:  Microbiology Results (last 10 days)     Procedure Component Value - Date/Time    Blood Culture - Blood, Arm, Left [[id number]]  (Abnormal) Collected: 01/01/21 2255    Lab Status: Preliminary result Specimen: Blood from Arm, Left Updated: 01/03/21 0727     Blood Culture Escherichia coli     Isolated from Aerobic and Anaerobic Bottles     Gram Stain Anaerobic Bottle Gram negative bacilli      Aerobic Bottle Gram negative bacilli    Blood Culture - Blood, Hand, Left [[id number]]  (Abnormal) Collected: 01/01/21 2255    Lab Status: Preliminary result Specimen: Blood from Hand, Left Updated: 01/03/21 0726     Blood Culture Escherichia coli     Isolated from Aerobic and Anaerobic Bottles     Gram Stain Anaerobic Bottle Gram negative bacilli      Aerobic Bottle Gram negative bacilli    Blood Culture ID, PCR - Blood, Hand, Left [[id number]]  (Abnormal) Collected: 01/01/21 2255    Lab Status: Final result Specimen: Blood from Hand, Left Updated: 01/02/21 1047     BCID, PCR Escherichia coli. Identification by BCID PCR.     BOTTLE TYPE Anaerobic Bottle    COVID-19, [facility] IN-HOUSE,NASAL Swab (NO TRANSPORT MEDIA) 2 HR TAT - Swab, Nasopharynx [[id number]]  (Normal) Collected: 01/01/21 2013    Lab Status: Final result Specimen: Swab from Nasopharynx Updated: 01/01/21 2037     COVID19 Presumptive Negative    Narrative:      Fact sheet for providers: https://www.fda.gov/media/801356/download     Fact sheet for patients: https://www.fda.gov/media/141562/download    Test performed by PCR.  If inconsistent with clinical signs and  symptoms patient should be tested with different authorized molecular test.    Urine Culture - Urine, Urine, Catheter [[id number]]  (Abnormal)  (Susceptibility) Collected: 01/01/21 1852    Lab Status: Final result Specimen: Urine, Catheter Updated: 01/03/21 0208     Urine Culture >100,000 CFU/mL Escherichia coli ESBL     Comment: Consider infectious disease consult.  Susceptibility results may not correlate to clinical outcomes.       Susceptibility      Escherichia coli ESBL     ANA     Amikacin Susceptible     Ertapenem Susceptible     Gentamicin Resistant     Levofloxacin Resistant     Meropenem Susceptible     Nitrofurantoin Susceptible     Piperacillin + Tazobactam Susceptible     Tetracycline Resistant     Tobramycin Resistant     Trimethoprim + Sulfamethoxazole Resistant                            RADIOLOGY STUDIES:  Imaging Results (Last 72 Hours)     Procedure Component Value Units Date/Time    XR Chest 1 View [[id number]] Collected: 01/03/21 1827     Updated: 01/03/21 1830    Narrative:      XR CHEST 1 VW-     Date of Exam: 1/3/2021 5:40 PM     Indication: resp distress; W19.XXXA-Unspecified fall, initial encounter;  R53.1-Weakness; N17.9-Acute kidney failure, unspecified; N39.0-Urinary  tract infection, site not specified; R31.9-Hematuria, unspecified.     Comparison: 01/01/2021     Technique: A single view of the chest was obtained.     FINDINGS:      The heart size and pulmonary vessels are within normal limits. The  lungs are clear bilaterally. There are no pleural effusions. Bony thorax  is unremarkable.                Impression:         1. No acute cardiopulmonary disease.        Electronically Signed By-Gera Young MD On:1/3/2021 6:28 PM  This report was finalized on [id number] by  Gera Young MD.    CT Cervical Spine Without Contrast [[id number]] Collected: 01/01/21 1829     Updated: 01/01/21 1836    Narrative:      DATE OF EXAM:  1/1/2021 6:00 PM     PROCEDURE:  CT CERVICAL SPINE WO  CONTRAST-     INDICATIONS:   Tripped and fell at nursing home, confusion, neck pain.     COMPARISON:   02/29/2020.     TECHNIQUE:  Routine transaxial slices were obtained through the cervical spine  without the administration of intravenous contrast. Reconstructed  coronal and sagittal images were also obtained. Automated exposure  control and iterative construction methods were used.      FINDINGS:  There is marked motion artifact, especially through the C4-C6 level. It  is difficult to exclude an acute fracture or dislocation. There is no  acute fracture seen within the remaining cervical spine. The patient has  multilevel endplate sclerosis and spurring consistent with spondylosis.  There are are bilateral facet degenerative changes. There is no definite  paraspinal hematoma or fluid collection. There are carotid artery  atherosclerotic vascular calcifications. The pulmonary apices are clear.  There are appear to be of varying degrees of canal and neural foraminal  stenosis as demonstrated on the previous CT exam where there was no  motion artifact present.       Impression:         1. Limited study due to marked motion artifact at the C4-C6 level. It is  difficult to exclude an acute fracture or dislocation in this area.  2. Degenerative spondylosis and facet arthritis.  3. Varying degrees of canal and neural foraminal stenosis which was  better appreciated on the previous CT exam where no motion artifact was  apparent.     Electronically Signed By-Erick Bautista MD On:1/1/2021 6:34 PM  This report was finalized on [id number] by  Erick Bautista MD.    CT Head Without Contrast [[id number]] Collected: 01/01/21 1811     Updated: 01/01/21 1815    Narrative:         DATE OF EXAM:  1/1/2021 6:00 PM     PROCEDURE:   CT HEAD WO CONTRAST-     INDICATIONS:   Fall at nursing home a, headaches and neck pain.     COMPARISON:  No Comparisons Available     TECHNIQUE:   Routine transaxial cuts were obtained through the head  without the  administration of contrast. Automated exposure control and iterative  reconstruction methods were used.      FINDINGS:  There is motion artifact which limits the study, especially through the  skull base. There appears to be volume loss secondary to atrophy. The  ventricles, sulci, and fissures are enlarged. There is no obvious of  large hemorrhage. There is also no obvious midline shift. It is  difficult to exclude a skull fracture. The orbital contents are normal.  The visualized paranasal sinuses are felt to be clear.        Impression:         1. Motion artifact which limits the study, especially to the skull base.  2. No large parenchymal hemorrhage.  3. There appears to be volume loss secondary to atrophy.     Electronically Signed By-Erick Bautista MD On:1/1/2021 6:13 PM  This report was finalized on [id number] by  Erick Bautista MD.    XR Chest 1 View [[id number]] Collected: 01/01/21 1758     Updated: 01/01/21 1801    Narrative:      DATE OF EXAM:  1/1/2021 5:52 PM     PROCEDURE:  XR CHEST 1 VW-     INDICATIONS:  Fall, weakness, shortness of breath.      COMPARISON:  08/09/2020.     TECHNIQUE:   Single radiographic view of the chest was obtained.     FINDINGS:  The heart size is normal. The pulmonary vascular markings are normal.  The lungs and pleural spaces are clear of active disease.  There are  chronic age-related changes involving the bony thorax and thoracic  aorta.       Impression:      No active disease.     Electronically Signed By-Erick Bautista MD On:1/1/2021 5:59 PM  This report was finalized on [id number] by  Erick Bautista MD.                           I reviewed the patient's new clinical results.    I discussed the patient's findings and my recommendations with patient and nursing staff.  I have discussed plan with on-call attending physician, who agrees with this plan of care.    Appropriate PPE worn during assessment of patient per established guidelines.      Electronically  signed by Sherice Philip, APRN, 01/03/21, 7:52 PM EST.

## 2021-01-04 NOTE — PROGRESS NOTES
Continued Stay Note   [facility]     Patient Name: Ronald Reyer  MRN: [id number]  Today's Date: 1/4/2021    Admit Date: 1/1/2021    Discharge Plan     Row Name 01/04/21 1552       Plan    Plan  DC Plan: Return to [facility], LTC. Okay to return per liaison/pt's sister. No PASRR or pre-cert needed for return.    Patient/Family in Agreement with Plan  yes    Plan Comments  INDIA texted liaison who confirmed pt is LTC at facility & can return at d/c. SW contacted pt's sister d/t pt being MISHA at time of attempt. She confirmed pt is LTC at facility and will want to return. Pt has been a resident there since 2011.     Phone communication only - no physical contact with patient or family.    JENNY Beckwith    Phone: [phone]  Cell: [phone]  Fax: [phone]  [email or web address]

## 2021-01-04 NOTE — THERAPY EVALUATION
"Acute Care - Speech Language Pathology   Swallow Initial Evaluation  [facility]     Patient Name: Ronald Reyer  : 1955  MRN: [id number]  Today's Date: 2021               Admit Date: 2021    Visit Dx:     ICD-10-CM ICD-9-CM   1. Fall, initial encounter  W19.XXXA E888.9   2. Weakness  R53.1 780.79   3. Acute kidney injury (CMS/HCC)  N17.9 584.9   4. Urinary tract infection with hematuria, site unspecified  N39.0 599.0    R31.9 599.70     Patient Active Problem List   Diagnosis   • Fall   • SRAVAN (acute kidney injury) (CMS/HCC)   • Stage 3b chronic kidney disease   • UTI (urinary tract infection), bacterial   • Polypharmacy   • E coli bacteremia   • Dyspnea     Past Medical History:   Diagnosis Date   • Antisocial personality disorder (CMS/HCC)    • Bipolar disorder (CMS/HCC)    • Chronic kidney disease    • Convulsions (CMS/HCC)    • Diabetes mellitus (CMS/HCC)    • Hypertension    • Insomnia    • Renal disorder    • Schizophrenia (CMS/HCC)      History reviewed. No pertinent surgical history.     SWALLOW EVALUATION (last 72 hours)      SLP Adult Swallow Evaluation     Row Name 21 1100          Document Type  evaluation  -MC          Patient Profile Reviewed  yes  -MC    Pertinent History Of Current Problem  Per H&P: \"65-year-old male presents the ER with a chief complaint of left elbow pain after being found on the floor at the [facility] today.  The patient reports he does not remember falling.  He states he probably just tripped.  The patient told me that he has only been at the [facility] for approximately a week.  However, with review of records it looks that the patient is a long-term [facility] resident.\" CXT with no acute findings. ST was consulted due to nursing staff reports of coughing while eating.   -MC    Current Method of Nutrition  NPO awaiting ST eval  -    Prior Level of Function-Swallowing  -- pt reports taking pills with applesauce  -          Additional Documentation  -- verbally denied pain  - "          Pain: FACES Scale, Pretreatment  0-->no hurt  -    Posttreatment Pain Rating  0-->no hurt  -          Oral Motor, Comment  Pt is edentulous, denies any impact this has on ability to consume solids. Otherwise grossly WFL.  -          Respiratory Support Currently in Use  nasal cannula  -          Oral Prep Phase  WFL  -    Oral Transit  WFL  -    Oral Residue  impaired  -    Pharyngeal Phase  -- coughing after eval  -    Esophageal Phase  -- potential esophageal dysphagia given nature of coughing  -    Clinical Swallow Evaluation Summary  Clinical swallow exam conducted with water, applesauce, fig ayala, & saltine cracker.  Reduced swallow safety with impulsive behaviors, consuming ~8 oz water by straw via multiple sequential drinks & taking large bites including consuming cracker in single bolus. Pt demonstrated coughing at end of exam, however no s/s aspiration correlated with PO trials.  Mastication functional but oral phase marked by stasis throughout oral cavity with both soft & regular solids which is cleared by multiple spontaneous swallows. Recommend mechanical soft diet with thin liquids, tray set up & supervise during first meal. Medications in puree. Ensure full HOB elevation during & for 30 minutes following all PO. ST will follow to ensure tolerance of least restrictive diet and for further recs as indicated.   -          SLP Swallowing Diagnosis  mild;oral dysphagia  -    Functional Impact  risk of aspiration/pneumonia due to impulsivity  -    Rehab Potential/Prognosis, Swallowing  good, to achieve stated therapy goals  -    Swallow Criteria for Skilled Therapeutic Interventions Met  demonstrates skilled criteria  -          Therapy Frequency (Swallow)  PRN  -    SLP Diet Recommendation  thin liquids mechanical soft, thin liquids, pills in puree  -    Recommended Precautions and Strategies  upright posture during/after eating;small bites of food and sips of  liquid;1:1 supervision;reflux precautions;general aspiration precautions  -    Oral Care Recommendations  Oral Care before breakfast, after meals and PRN  -    SLP Rec. for Method of Medication Administration  with pudding or applesauce  -    Monitor for Signs of Aspiration  yes;notify SLP if any concerns  -          Oral Nutrition/Hydration Goal Selection (SLP)  oral nutrition/hydration, SLP goal 1;oral nutrition/hydration, SLP goal 2  -          Oral Nutrition/Hydration Goal 1, SLP  Pt will tolerate recommended diet with no s/s aspiration   -    Time Frame (Oral Nutrition/Hydration Goal 1, SLP)  by discharge  -          Oral Nutrition/Hydration Goal 2, SLP  Pt will verbalize/demonstrate understanding of safe swallow strategies & techniques with max cues as required.   -    Time Frame (Oral Nutrition/Hydration Goal 2, SLP)  2 days;3 days  -      User Key  (r) = Recorded By, (t) = Taken By, (c) = Cosigned By    Initials Name Effective Dates     Sallie Ca, SLP 03/01/19 -           EDUCATION  The patient has been educated in the following areas:   Modified Diet Instruction, ST POC, safe swallow strategies & positioning, aspiration risk    SLP Recommendation and Plan  SLP Swallowing Diagnosis: mild, oral dysphagia  SLP Diet Recommendation: thin liquids(mechanical soft, thin liquids, pills in puree)  Recommended Precautions and Strategies: upright posture during/after eating, small bites of food and sips of liquid, 1:1 supervision, reflux precautions, general aspiration precautions  SLP Rec. for Method of Medication Administration: with pudding or applesauce     Monitor for Signs of Aspiration: yes, notify SLP if any concerns     Swallow Criteria for Skilled Therapeutic Interventions Met: demonstrates skilled criteria     Rehab Potential/Prognosis, Swallowing: good, to achieve stated therapy goals  Therapy Frequency (Swallow): PRN              Patient was not wearing a face mask during this  therapy encounter. Therapist used appropriate personal protective equipment including mask, eye protection and gloves.  Mask used was standard procedure mask. Appropriate PPE was worn during the entire therapy session. Hand hygiene was completed before and after therapy session. Patient is not in enhanced droplet precautions.         SLP GOALS     Row Name 01/04/21 1100          Oral Nutrition/Hydration Goal 1, SLP  Pt will tolerate recommended diet with no s/s aspiration   -    Time Frame (Oral Nutrition/Hydration Goal 1, SLP)  by discharge  -          Oral Nutrition/Hydration Goal 2, SLP  Pt will verbalize/demonstrate understanding of safe swallow strategies & techniques with max cues as required.   -    Time Frame (Oral Nutrition/Hydration Goal 2, SLP)  2 days;3 days  -      User Key  (r) = Recorded By, (t) = Taken By, (c) = Cosigned By    Initials Name Provider Type    Sallie Mcclain, SLP Speech and Language Pathologist             Time Calculation:                JOVAN Canchola  1/4/2021

## 2021-01-04 NOTE — PLAN OF CARE
Goal Outcome Evaluation:  Plan of Care Reviewed With: patient  Progress: improving  Outcome Summary: pt was cooperative with PT today and was following direction well.  pt required moderate assist of 2 for supine to sit to supine.  pt has difficulty with inital sitting balance leaning posterior.   pt was able to stand 2 times off eob and stood briefly.  pt took a few short steps up toward hob with walker and min assist of 2.   pt is far from his PLOF but potential cont to recover.  Recommend IP rehab at d/c.  PPE used:  gown, mask, glasses and gloves

## 2021-01-05 ENCOUNTER — APPOINTMENT (OUTPATIENT)
Dept: CT IMAGING | Facility: HOSPITAL | Age: 66
End: 2021-01-05

## 2021-01-05 PROBLEM — I26.99 ACUTE PULMONARY EMBOLISM WITHOUT ACUTE COR PULMONALE (HCC): Status: ACTIVE | Noted: 2021-01-05

## 2021-01-05 PROBLEM — J96.01 ACUTE RESPIRATORY FAILURE WITH HYPOXIA (HCC): Status: ACTIVE | Noted: 2021-01-03

## 2021-01-05 LAB
ALBUMIN SERPL-MCNC: 1.9 G/DL (ref 3.5–5.2)
ANION GAP SERPL CALCULATED.3IONS-SCNC: 10 MMOL/L (ref 5–15)
BUN SERPL-MCNC: 75 MG/DL (ref 8–23)
BUN/CREAT SERPL: 25.3 (ref 7–25)
CALCIUM SPEC-SCNC: 8.3 MG/DL (ref 8.6–10.5)
CHLORIDE SERPL-SCNC: 101 MMOL/L (ref 98–107)
CO2 SERPL-SCNC: 21 MMOL/L (ref 22–29)
CREAT SERPL-MCNC: 2.96 MG/DL (ref 0.76–1.27)
D DIMER PPP FEU-MCNC: 10.05 MG/L (FEU) (ref 0–0.59)
DEPRECATED RDW RBC AUTO: 53.8 FL (ref 37–54)
ERYTHROCYTE [DISTWIDTH] IN BLOOD BY AUTOMATED COUNT: 16.9 % (ref 12.3–15.4)
GFR SERPL CREATININE-BSD FRML MDRD: 22 ML/MIN/1.73
GLUCOSE BLDC GLUCOMTR-MCNC: 79 MG/DL (ref 70–105)
GLUCOSE BLDC GLUCOMTR-MCNC: 80 MG/DL (ref 70–105)
GLUCOSE BLDC GLUCOMTR-MCNC: 95 MG/DL (ref 70–105)
GLUCOSE BLDC GLUCOMTR-MCNC: 98 MG/DL (ref 70–105)
GLUCOSE SERPL-MCNC: 93 MG/DL (ref 65–99)
HCT VFR BLD AUTO: 27.6 % (ref 37.5–51)
HGB BLD-MCNC: 9 G/DL (ref 13–17.7)
MAGNESIUM SERPL-MCNC: 2 MG/DL (ref 1.6–2.4)
MCH RBC QN AUTO: 29.5 PG (ref 26.6–33)
MCHC RBC AUTO-ENTMCNC: 32.8 G/DL (ref 31.5–35.7)
MCV RBC AUTO: 90 FL (ref 79–97)
PHOSPHATE SERPL-MCNC: 3.8 MG/DL (ref 2.5–4.5)
PLATELET # BLD AUTO: 115 10*3/MM3 (ref 140–450)
PMV BLD AUTO: 7.8 FL (ref 6–12)
POTASSIUM SERPL-SCNC: 4.5 MMOL/L (ref 3.5–5.2)
RBC # BLD AUTO: 3.06 10*6/MM3 (ref 4.14–5.8)
SODIUM SERPL-SCNC: 132 MMOL/L (ref 136–145)
WBC # BLD AUTO: 17.3 10*3/MM3 (ref 3.4–10.8)

## 2021-01-05 PROCEDURE — 83735 ASSAY OF MAGNESIUM: CPT | Performed by: INTERNAL MEDICINE

## 2021-01-05 PROCEDURE — 80069 RENAL FUNCTION PANEL: CPT | Performed by: INTERNAL MEDICINE

## 2021-01-05 PROCEDURE — 99233 SBSQ HOSP IP/OBS HIGH 50: CPT | Performed by: INTERNAL MEDICINE

## 2021-01-05 PROCEDURE — 92526 ORAL FUNCTION THERAPY: CPT

## 2021-01-05 PROCEDURE — 85027 COMPLETE CBC AUTOMATED: CPT | Performed by: INTERNAL MEDICINE

## 2021-01-05 PROCEDURE — 94799 UNLISTED PULMONARY SVC/PX: CPT

## 2021-01-05 PROCEDURE — 82962 GLUCOSE BLOOD TEST: CPT

## 2021-01-05 PROCEDURE — 85379 FIBRIN DEGRADATION QUANT: CPT | Performed by: INTERNAL MEDICINE

## 2021-01-05 PROCEDURE — 63710000001 INSULIN GLARGINE PER 5 UNITS: Performed by: NURSE PRACTITIONER

## 2021-01-05 PROCEDURE — 25010000002 ENOXAPARIN PER 10 MG: Performed by: INTERNAL MEDICINE

## 2021-01-05 PROCEDURE — 25010000002 MEROPENEM PER 100 MG: Performed by: INTERNAL MEDICINE

## 2021-01-05 PROCEDURE — 71250 CT THORAX DX C-: CPT

## 2021-01-05 RX ORDER — IPRATROPIUM BROMIDE AND ALBUTEROL SULFATE 2.5; .5 MG/3ML; MG/3ML
3 SOLUTION RESPIRATORY (INHALATION)
Status: DISCONTINUED | OUTPATIENT
Start: 2021-01-05 | End: 2021-01-09 | Stop reason: HOSPADM

## 2021-01-05 RX ORDER — ATENOLOL 50 MG/1
50 TABLET ORAL ONCE
Status: COMPLETED | OUTPATIENT
Start: 2021-01-05 | End: 2021-01-05

## 2021-01-05 RX ORDER — ATENOLOL 50 MG/1
100 TABLET ORAL DAILY
Status: DISCONTINUED | OUTPATIENT
Start: 2021-01-06 | End: 2021-01-09 | Stop reason: HOSPADM

## 2021-01-05 RX ORDER — TAMSULOSIN HYDROCHLORIDE 0.4 MG/1
0.4 CAPSULE ORAL DAILY
Status: DISCONTINUED | OUTPATIENT
Start: 2021-01-05 | End: 2021-01-09 | Stop reason: HOSPADM

## 2021-01-05 RX ADMIN — DIVALPROEX SODIUM 500 MG: 500 TABLET, DELAYED RELEASE ORAL at 22:31

## 2021-01-05 RX ADMIN — Medication 10 ML: at 09:11

## 2021-01-05 RX ADMIN — Medication 10 ML: at 22:32

## 2021-01-05 RX ADMIN — POLYETHYLENE GLYCOL 3350 17 G: 17 POWDER, FOR SOLUTION ORAL at 09:11

## 2021-01-05 RX ADMIN — MIRTAZAPINE 7.5 MG: 7.5 TABLET ORAL at 22:35

## 2021-01-05 RX ADMIN — ATENOLOL 50 MG: 50 TABLET ORAL at 09:11

## 2021-01-05 RX ADMIN — MEROPENEM 500 MG: 500 INJECTION, POWDER, FOR SOLUTION INTRAVENOUS at 11:42

## 2021-01-05 RX ADMIN — TAMSULOSIN HYDROCHLORIDE 0.4 MG: 0.4 CAPSULE ORAL at 09:16

## 2021-01-05 RX ADMIN — MEROPENEM 500 MG: 500 INJECTION, POWDER, FOR SOLUTION INTRAVENOUS at 22:32

## 2021-01-05 RX ADMIN — ENOXAPARIN SODIUM 60 MG: 60 INJECTION SUBCUTANEOUS at 22:31

## 2021-01-05 RX ADMIN — Medication 10 MG: at 22:41

## 2021-01-05 RX ADMIN — ENOXAPARIN SODIUM 30 MG: 30 INJECTION SUBCUTANEOUS at 15:21

## 2021-01-05 RX ADMIN — INSULIN GLARGINE 18 UNITS: 100 INJECTION, SOLUTION SUBCUTANEOUS at 22:41

## 2021-01-05 RX ADMIN — MEROPENEM 500 MG: 500 INJECTION, POWDER, FOR SOLUTION INTRAVENOUS at 02:48

## 2021-01-05 RX ADMIN — DIVALPROEX SODIUM 250 MG: 250 TABLET, DELAYED RELEASE ORAL at 09:11

## 2021-01-05 RX ADMIN — POLYETHYLENE GLYCOL 3350 17 G: 17 POWDER, FOR SOLUTION ORAL at 22:31

## 2021-01-05 RX ADMIN — DIVALPROEX SODIUM 125 MG: 125 TABLET, DELAYED RELEASE ORAL at 15:21

## 2021-01-05 RX ADMIN — IPRATROPIUM BROMIDE AND ALBUTEROL SULFATE 3 ML: 2.5; .5 SOLUTION RESPIRATORY (INHALATION) at 20:08

## 2021-01-05 RX ADMIN — QUETIAPINE FUMARATE 50 MG: 25 TABLET ORAL at 22:31

## 2021-01-05 RX ADMIN — ATENOLOL 50 MG: 50 TABLET ORAL at 22:31

## 2021-01-05 NOTE — PROGRESS NOTES
"PULMONARY CRITICAL CARE [facility]  NOTE      PATIENT IDENTIFICATION:  Name: Reyer, Ronald  MRN: [id number]  :  1955     Age: 65 y.o.  Sex: male    DATE OF Note:  2021   Referring Physician: Davide Mills MD                  Subjective:   Resting but looks fatigue  no SOB no chest pain, no nausea or vomiting, no change in bowel habit, no dysuria,  no new  skin rash or itching.      Objective:  tMax 24 hrs: Temp (24hrs), Av.5 °F (36.9 °C), Min:97.4 °F (36.3 °C), Max:99.7 °F (37.6 °C)      Vitals Ranges:   Temp:  [97.4 °F (36.3 °C)-99.7 °F (37.6 °C)] 98.5 °F (36.9 °C)  Heart Rate:  [66-81] 75  Resp:  [16-24] 18  BP: (134-159)/(59-81) 159/81    Intake and Output Last 3 Shifts:   I/O last 3 completed shifts:  In: 1600 [P.O.:1600]  Out: 300 [Urine:300]    Exam:  /81   Pulse 75   Temp 98.5 °F (36.9 °C) (Oral)   Resp 18   Ht 182.9 cm (72\")   Wt 88.8 kg (195 lb 12.3 oz)   SpO2 98%   BMI 26.55 kg/m²     General Appearance:   Looks fatigue  HEENT:  Normocephalic, without obvious abnormality, Conjunctiva/corneas clear,.  Normal external ear canals, Nares normal, no drainage     Neck:  Supple, symmetrical, trachea midline. No JVD.  Lungs /Chest wall:   Bilateral basal rhonchi, respirations unlabored symmetrical wall movement.     Heart:  Regular rate and rhythm, systolic murmur PMI left sternal border  Abdomen: Soft, non-tender, no masses, no organomegaly.    Extremities: Trace edema no clubbing or Cyanosis        Medications:    Current Facility-Administered Medications:   •  acetaminophen (TYLENOL) tablet 650 mg, 650 mg, Oral, Q4H PRN, 650 mg at 21 0953 **OR** acetaminophen (TYLENOL) 160 MG/5ML solution 650 mg, 650 mg, Oral, Q4H PRN **OR** acetaminophen (TYLENOL) suppository 650 mg, 650 mg, Rectal, Q4H PRN, Anita Farnsworth FNP, 650 mg at 21 1905  •  atenolol (TENORMIN) tablet 50 mg, 50 mg, Oral, Daily, Anita Farnsworth FNP, 50 mg at 21 1057  •  dextrose (D50W) 25 g/ 50mL Intravenous " Solution 25 g, 25 g, Intravenous, Q15 Min PRN, Anita Farnsworth FNP  •  dextrose (GLUTOSE) oral gel 15 g, 15 g, Oral, Q15 Min PRN, Anita Farnsworth FNP  •  divalproex (DEPAKOTE) DR tablet 125 mg, 125 mg, Oral, Daily, Anita Farnsworth FNP, 125 mg at 01/04/21 1531  •  divalproex (DEPAKOTE) DR tablet 250 mg, 250 mg, Oral, Daily, Anita Farnsworth FNP, 250 mg at 01/04/21 1058  •  divalproex (DEPAKOTE) DR tablet 500 mg, 500 mg, Oral, Nightly, Anita Farnsworth FNP, 500 mg at 01/04/21 2001  •  enoxaparin (LOVENOX) syringe 30 mg, 30 mg, Subcutaneous, Daily, Davide Mills MD, 30 mg at 01/04/21 1531  •  glucagon (human recombinant) (GLUCAGEN DIAGNOSTIC) injection 1 mg, 1 mg, Subcutaneous, Q15 Min PRN, Anita Farnsworth FNP  •  insulin glargine (LANTUS, SEMGLEE) injection 18 Units, 18 Units, Subcutaneous, Nightly, Anita Farnsworth FNP, 18 Units at 01/04/21 2002  •  insulin lispro (humaLOG, ADMELOG) injection 0-7 Units, 0-7 Units, Subcutaneous, TID AC **AND** insulin lispro (humaLOG, ADMELOG) injection 0-7 Units, 0-7 Units, Subcutaneous, PRN, Anita Farnsworth FNP  •  ipratropium-albuterol (DUO-NEB) nebulizer solution 3 mL, 3 mL, Nebulization, Q6H PRN, Sherice Philip APRN  •  melatonin tablet 10 mg, 10 mg, Oral, Nightly, Anita Farnsworth FNP, 10 mg at 01/04/21 2001  •  meropenem (MERREM) 500 mg in sodium chloride 0.9 % 100 mL IVPB, 500 mg, Intravenous, Q8H, Davide Mills MD, 500 mg at 01/04/21 2001  •  mirtazapine (REMERON) tablet 7.5 mg, 7.5 mg, Oral, Nightly, Anita Farnsworth FNP, 7.5 mg at 01/04/21 2001  •  ondansetron (ZOFRAN) tablet 4 mg, 4 mg, Oral, Q6H PRN **OR** ondansetron (ZOFRAN) injection 4 mg, 4 mg, Intravenous, Q6H PRN, Anita Farnsworth FNP  •  polyethylene glycol (MIRALAX) packet 17 g, 17 g, Oral, BID, Davide Mills MD, 17 g at 01/04/21 2001  •  QUEtiapine (SEROquel) tablet 50 mg, 50 mg, Oral, Nightly, Davide Mills MD, 50 mg at 01/04/21 2001  •  sodium chloride 0.9 % flush 10 mL, 10 mL, Intravenous, Q12H, Anita Farnsworth FNP, 10 mL at 01/04/21 2001  •  sodium  chloride 0.9 % flush 10 mL, 10 mL, Intravenous, PRN, Anita Farnsworth FNP  •  sodium chloride 0.9 % infusion, 125 mL/hr, Intravenous, Continuous, Davide Mills MD, Last Rate: 125 mL/hr at 01/03/21 1457, 125 mL/hr at 01/03/21 1457    Data Review:  All labs (24hrs):   Recent Results (from the past 24 hour(s))   Troponin    Collection Time: 01/04/21  3:24 AM    Specimen: Blood   Result Value Ref Range    Troponin T <0.010 0.000 - 0.030 ng/mL   Comprehensive Metabolic Panel    Collection Time: 01/04/21  3:24 AM    Specimen: Blood   Result Value Ref Range    Glucose 79 65 - 99 mg/dL    BUN 69 (H) 8 - 23 mg/dL    Creatinine 3.41 (H) 0.76 - 1.27 mg/dL    Sodium 133 (L) 136 - 145 mmol/L    Potassium 5.1 3.5 - 5.2 mmol/L    Chloride 103 98 - 107 mmol/L    CO2 20.0 (L) 22.0 - 29.0 mmol/L    Calcium 8.7 8.6 - 10.5 mg/dL    Total Protein 5.9 (L) 6.0 - 8.5 g/dL    Albumin 2.20 (L) 3.50 - 5.20 g/dL    ALT (SGPT) 20 1 - 41 U/L    AST (SGOT) 34 1 - 40 U/L    Alkaline Phosphatase 274 (H) 39 - 117 U/L    Total Bilirubin 0.4 0.0 - 1.2 mg/dL    eGFR Non African Amer 18 (L) >60 mL/min/1.73    Globulin 3.7 gm/dL    A/G Ratio 0.6 g/dL    BUN/Creatinine Ratio 20.2 7.0 - 25.0    Anion Gap 10.0 5.0 - 15.0 mmol/L   CBC (No Diff)    Collection Time: 01/04/21  3:25 AM    Specimen: Blood   Result Value Ref Range    WBC 21.20 (H) 3.40 - 10.80 10*3/mm3    RBC 3.25 (L) 4.14 - 5.80 10*6/mm3    Hemoglobin 9.6 (L) 13.0 - 17.7 g/dL    Hematocrit 29.2 (L) 37.5 - 51.0 %    MCV 89.7 79.0 - 97.0 fL    MCH 29.5 26.6 - 33.0 pg    MCHC 32.9 31.5 - 35.7 g/dL    RDW 16.3 (H) 12.3 - 15.4 %    RDW-SD 50.8 37.0 - 54.0 fl    MPV 7.9 6.0 - 12.0 fL    Platelets 115 (L) 140 - 450 10*3/mm3   POC Glucose Once    Collection Time: 01/04/21  7:26 AM    Specimen: Blood   Result Value Ref Range    Glucose 83 70 - 105 mg/dL   Sodium, Urine, Random - Urine, Clean Catch    Collection Time: 01/04/21  9:23 AM    Specimen: Urine, Clean Catch   Result Value Ref Range    Sodium, Urine <20  mmol/L   POC Glucose Once    Collection Time: 01/04/21 11:20 AM    Specimen: Blood   Result Value Ref Range    Glucose 135 (H) 70 - 105 mg/dL   POC Glucose Once    Collection Time: 01/04/21  5:21 PM    Specimen: Blood   Result Value Ref Range    Glucose 185 (H) 70 - 105 mg/dL   POC Glucose Once    Collection Time: 01/04/21  7:56 PM    Specimen: Blood   Result Value Ref Range    Glucose 151 (H) 70 - 105 mg/dL        Imaging:  CT Abdomen Pelvis Stone Protocol  Narrative: Examination: CT ABDOMEN PELVIS STONE PROTOCOL-     Date of Exam: 1/4/2021 11:50 AM     Indication: Urosepsis; W19.XXXA-Unspecified fall, initial encounter;  R53.1-Weakness; N17.9-Acute kidney failure, unspecified; N39.0-Urinary  tract infection, site not specified; R31.9-Hematuria, unspecified.     Comparison: None available.     Technique: Noncontrast axial volumetric CT imaging of the abdomen was  performed. Automated exposure control and iterative reconstruction  methods were used.     Findings:  There is a small right-sided pleural effusion with mild dependent  atelectasis. No acute acute findings in the mediastinum. There is mild  body wall edema. There are no acute osseous abnormalities or destructive  bone lesions. There is mild thoracolumbar disc degeneration.     There is a solitary gallstone without evidence of acute cholecystitis.  No biliary distention. There is a small amount of scattered ascites.  There is mild urinary bladder wall thickening. There is mild aortoiliac  atherosclerosis. There is symmetric bilateral perinephric stranding.  Prostate is normal size. There is no pneumoperitoneum or  lymphadenopathy. The adrenal glands and pancreas appear within normal  limits. Liver margins appear lobulated. There is a 13 mm simple cyst at  the superior right kidney. No hydronephrosis.     Impression:    1. There is a small volume of scattered ascites, mild body wall edema  and a small right-sided pleural effusion, which would indicate  an  abnormal fluid status. Liver margins are lobulated, which could indicate  cirrhosis. Correlate for the possibility of liver, kidney or heart  failure  2. Bilateral perinephric stranding, which is nonspecific. There is mild  thickening of the urinary bladder wall. This could be related to the  patient's known urosepsis.  3. Mild osseous degenerative changes.     Electronically Signed By-Juanjo Pastor MD On:1/4/2021 2:27 PM  This report was finalized on [id number] by  Juanjo Pastor MD.       ASSESSMENT:    E coli bacteremia    Fall    SRAVAN (acute kidney injury) (CMS/HCC)    Stage 3b chronic kidney disease    UTI (urinary tract infection), bacterial    Polypharmacy    Dyspnea         PLAN:  IV fluid per renal  Continue the current antibiotics  Bronchodilator  Inhaled corticosteroids  Electrolytes/ glycemic control  DVT and GI prophylaxis.    Total Critical care time in direct medical management (   ) minutes  Chetna Haines MD. D, ABSM.     1/4/2021  20:43 EST

## 2021-01-05 NOTE — PROGRESS NOTES
[facility] Medicine Services  INPATIENT PROGRESS NOTE  2116/1   Hospitalist Team  LOS 4 days      Patient Care Team:  Provider, No Known as PCP - Vincent Yao MD as Consulting Physician (Hospitalist)      Patient was examined with relevant and adequate PPE keeping in mind the current coronavirus pandemic. Minimum of 10 minutes to don and doff PPE.    Chief Complaint / Subjective  Chief Complaint   Patient presents with   • Fall       HPI (4 hpi elements or status of 3 chronic)     65-year-old male presents the ER with a chief complaint of left elbow pain after being found on the floor at the [facility] today.  The patient reports he does not remember falling.  He states he probably just tripped.  The patient told me that he has only been at the [facility] for approximately a week.  However, with review of records it looks that the patient is a long-term [facility] resident.    Patient unable to provide any history drowsy.    No change.  Still nonverbal mostly    Unsure of the baseline.  Patient agitated in the daytime otherwise answers in monosyllables right now 01/04/21, 6:13 PM EST    No significant change. On room air 01/05/21, 2:36 PM EST         Fall          History taken from: chart    Review of Systems   Unable to perform ROS: dementia             Family History   Problem Relation Age of Onset   • No Known Problems Mother    • No Known Problems Father        Past Medical History:   Diagnosis Date   • Antisocial personality disorder (CMS/HCC)    • Bipolar disorder (CMS/HCC)    • Chronic kidney disease    • Convulsions (CMS/HCC)    • Diabetes mellitus (CMS/HCC)    • Hypertension    • Insomnia    • Renal disorder    • Schizophrenia (CMS/HCC)        Social History     Socioeconomic History   • Marital status:      Spouse name: Not on file   • Number of children: Not on file   • Years of education: Not on file   • Highest education level: Not on file   Tobacco Use   • Smoking status: Current  Every Day Smoker     Packs/day: 0.50     Types: Cigarettes   • Smokeless tobacco: Never Used   Substance and Sexual Activity   • Alcohol use: Never     Frequency: Never   • Drug use: Never   • Sexual activity: Defer       Prior to Admission medications    Medication Sig Start Date End Date Taking? Authorizing Provider   acetaminophen (TYLENOL) 325 MG tablet Take 650 mg by mouth Every 4 (Four) Hours As Needed for Mild Pain  or Fever.   Yes Jesus Bowser MD   atenolol (TENORMIN) 50 MG tablet Take 50 mg by mouth Daily.   Yes Jesus Bowser MD   divalproex (DEPAKOTE) 125 MG DR tablet Take 125 mg by mouth Daily.   Yes Jesus Bowser MD   divalproex (DEPAKOTE) 250 MG DR tablet Take 250 mg by mouth Daily.   Yes Jesus Bowser MD   divalproex (DEPAKOTE) 500 MG DR tablet Take 500 mg by mouth Every Night.   Yes Jesus Bowser MD   fluPHENAZine (PROLIXIN) 1 MG tablet Take 1 mg by mouth 3 (Three) Times a Day.   Yes Jesus Bowser MD   glucagon (GlucaGen HypoKit) 1 MG injection Infuse 1 mg into a venous catheter 2 (Two) Times a Day As Needed for Low Blood Sugar.   Yes Jesus Bowser MD   ibuprofen (ADVIL,MOTRIN) 800 MG tablet Take 800 mg by mouth Every 8 (Eight) Hours As Needed for Mild Pain .   Yes Jesus Bowser MD   insulin glargine (LANTUS, SEMGLEE) 100 UNIT/ML injection Inject 20 Units under the skin into the appropriate area as directed Every Night.   Yes Jesus Bowser MD   lisinopril (PRINIVIL,ZESTRIL) 5 MG tablet Take 5 mg by mouth Daily.   Yes Jesus Bowser MD   melatonin 5 MG tablet tablet Take 10 mg by mouth Every Night.   Yes Jesus Bowser MD   Menthol, Topical Analgesic, (Biofreeze) 4 % gel Apply  topically 2 (Two) Times a Day As Needed. BL Feet legs back and hips   Yes Jesus Bowser MD   mirtazapine (REMERON) 7.5 MG tablet Take 7.5 mg by mouth Every Night.   Yes Jesus Bowser MD   Prenatal Vit-Fe Fumarate-FA (M-Vit)  "tablet Take 1 tablet by mouth Daily.   Yes ProviderJesus MD   promethazine (PHENERGAN) 12.5 MG tablet Take 12.5 mg by mouth Every 6 (Six) Hours As Needed for Nausea or Vomiting.   Yes Jesus Bowser MD   QUEtiapine (SEROquel) 100 MG tablet Take 100 mg by mouth Every Night.   Yes Jesus Bowser MD        Objective    Physical Exam     Vital Signs  Temp:  [97.5 °F (36.4 °C)-100.3 °F (37.9 °C)] 100.3 °F (37.9 °C)  Heart Rate:  [75-77] 75  Resp:  [18-24] 18  BP: (149-159)/(63-81) 153/63    Oxygen Therapy  SpO2: 95 %  Pulse Oximetry Type: Continuous  Device (Oxygen Therapy): room air  Flow (L/min): 4  Flowsheet Rows      First Filed Value   Admission Height  182.9 cm (72\") Documented at 01/01/2021 1711   Admission Weight  90.3 kg (199 lb) Documented at 01/01/2021 1711        Weight change: 2.2 kg (4 lb 13.6 oz)   Intake & Output (last 3 days)       01/02 0701 - 01/03 0700 01/03 0701 - 01/04 0700 01/04 0701 - 01/05 0700 01/05 0701 - 01/06 0700    P.O. 1490 960 640 240    IV Piggyback        Total Intake(mL/kg) 1490 (17.3) 960 (10.8) 640 (7) 240 (2.6)    Urine (mL/kg/hr) 525 (0.3) 175 (0.1) 125 (0.1)     Stool 0  0     Total Output 525 175 125     Net +965 +785 +515 +240            Urine Unmeasured Occurrence 3 x 4 x 3 x     Stool Unmeasured Occurrence 1 x  1 x         Lines, Drains & Airways    Active LDAs     Name:   Placement date:   Placement time:   Site:   Days:    Peripheral IV 01/02/21 0100 Anterior;Proximal;Right;Upper Arm   01/02/21 0100    Arm   less than 1                Physical Exam:    Physical Exam  Vitals signs and nursing note reviewed.   Constitutional:       General: He is not in acute distress.     Appearance: Normal appearance. He is well-developed. He is not ill-appearing, toxic-appearing or diaphoretic.   HENT:      Head: Normocephalic and atraumatic.      Right Ear: Ear canal and external ear normal.      Left Ear: Ear canal and external ear normal.      Nose: Nose normal. " No congestion or rhinorrhea.      Mouth/Throat:      Mouth: Mucous membranes are dry.      Pharynx: No oropharyngeal exudate.   Eyes:      General: No scleral icterus.        Right eye: No discharge.         Left eye: No discharge.      Extraocular Movements: Extraocular movements intact.      Conjunctiva/sclera: Conjunctivae normal.      Pupils: Pupils are equal, round, and reactive to light.   Neck:      Musculoskeletal: Normal range of motion and neck supple. No neck rigidity or muscular tenderness.      Thyroid: No thyromegaly.      Vascular: No carotid bruit or JVD.      Trachea: No tracheal deviation.   Cardiovascular:      Rate and Rhythm: Normal rate and regular rhythm.      Pulses: Normal pulses.      Heart sounds: Normal heart sounds. No murmur. No friction rub. No gallop.    Pulmonary:      Effort: Pulmonary effort is normal. No respiratory distress.      Breath sounds: No stridor. Wheezing present. No rhonchi or rales.      Comments: Rapid shallow breathing  Chest:      Chest wall: No tenderness.   Abdominal:      General: Bowel sounds are normal. There is no distension.      Palpations: Abdomen is soft. There is no mass.      Tenderness: There is no abdominal tenderness. There is no guarding or rebound.      Hernia: No hernia is present.   Musculoskeletal: Normal range of motion.         General: No swelling, tenderness, deformity or signs of injury.      Right lower leg: No edema.      Left lower leg: No edema.   Lymphadenopathy:      Cervical: No cervical adenopathy.   Skin:     General: Skin is warm and dry.      Coloration: Skin is not jaundiced or pale.      Findings: No bruising, erythema or rash.   Neurological:      General: No focal deficit present.      Mental Status: He is alert. Mental status is at baseline.      Cranial Nerves: No cranial nerve deficit.      Sensory: No sensory deficit.      Motor: No weakness or abnormal muscle tone.      Coordination: Coordination normal.               PT  Recommendation and Plan             Procedures:    * No surgery found *     Assessment/Plan with Problem wise       Active Hospital Problems    Diagnosis  POA   • **E coli bacteremia [R78.81, B96.20]  Yes     Priority: High   • Acute respiratory failure with hypoxia (CMS/[facility]) [J96.01]  Yes   • SRAVAN (acute kidney injury) (CMS/[facility]) [N17.9]  Yes   • Stage 3b chronic kidney disease [N18.32]  Yes   • UTI (urinary tract infection), bacterial [N39.0, A49.9]  Yes   • Polypharmacy [Z79.899]  Not Applicable   • Fall [W19.XXXA]  Yes      Resolved Hospital Problems   No resolved problems to display.        Estimated Creatinine Clearance: 32.9 mL/min (A) (by C-G formula based on SCr of 2.96 mg/dL (H)).    Code Status and Medical Interventions:   Ordered at: 01/01/21 2129     Code Status:    CPR     Medical Interventions (Level of Support Prior to Arrest):    Full       MEDICAL DECISION MAKING COMPLEXITY BY PROBLEM:     E. coli bacteremia  Secondary to UTI  Meropenem    UTI:  Cultures  Empiric antibiotics till cultures finalize  Remove Mclain when not needed  Screening urinalysis should not be done and is not recommended by any professional society or organization.  Does not achieve any purpose except antibiotic resistance and adverse effects like C. difficile colitis      Renal failure/insufficiency/injury:  Hydration  Supportive treatment  Cut down or hold ACE inhibitors  Avoid nephrotoxic medications   Address diuretics      Polypharmacy probably contributing to his current condition and constipation.  And impaired intake.    Care coordination with nursing 01/02/21 10:15 AM EST.    Care coordination with nursing and pharmacy regarding  his antibiotics    Stable.  Continue antibiotics for 14 days discharge planning 1 to 2 days    Appreciate nephrology and ID input.  Need to rule out PE.  Care coordination with nursing for current care 01/05/21, 2:37 PM EST      Plan for disposition:            Continued Care and Services -  Admitted Since 1/1/2021    Coordination has not been started for this encounter.        Historical & Objective Data     Results Review:    I reviewed the patient's new lab and radiology results. 01/05/21     Results from last 7 days   Lab Units 01/05/21 0351 01/04/21 0325 01/03/21 1752 01/01/21  1733   WBC 10*3/mm3 17.30* 21.20* 18.50*   < > 15.30*   HEMOGLOBIN g/dL 9.0* 9.6* 10.7*   < > 10.4*   HEMOGLOBIN, POC   --   --   --    < >  --    HEMATOCRIT % 27.6* 29.2* 32.0*   < > 31.3*   HEMATOCRIT POC   --   --   --    < >  --    MCV fL 90.0 89.7 89.9   < > 89.1   MCH pg 29.5 29.5 30.0   < > 29.6   MPV fL 7.8 7.9 7.6   < > 7.5   RDW % 16.9* 16.3* 16.4*   < > 16.2*   PLATELETS 10*3/mm3 115* 115* 126*   < > 162   MONOCYTES % %  --   --   --   --  17.0*    < > = values in this interval not displayed.     Results from last 7 days   Lab Units 01/05/21 0351 01/04/21 0324 01/03/21 1752 01/01/21  1733   SODIUM mmol/L 132* 133* 132*  --  133*   POTASSIUM mmol/L 4.5 5.1 5.4*   < > 4.7   CHLORIDE mmol/L 101 103 99   < > 99   CO2 mmol/L 21.0* 20.0* 24.0   < > 24.0   BUN mg/dL 75* 69* 66*   < > 35*   CREATININE mg/dL 2.96* 3.41* 3.44*   < > 2.24*   CALCIUM mg/dL 8.3* 8.7 8.7   < > 8.9   MAGNESIUM mg/dL 2.0  --  1.9  --   --    BILIRUBIN mg/dL  --  0.4 0.3  --  0.4   ALK PHOS U/L  --  274* 307*  --  100   ALT (SGPT) U/L  --  20 22  --  12   AST (SGOT) U/L  --  34 37  --  22   GLUCOSE mg/dL 93 79 107*   < > 115*    < > = values in this interval not displayed.     Inflammatory Biomarkers        Invalid input(s): ESR, D-DIMER QUANTITATIVE,  PROCALCITONIN,    Lab Results   Component Value Date    PHOS 3.8 01/05/2021     No results found for: HGBA1C  No results found for: CHOL, CHLPL, TRIG, HDL, LDL, LDLDIRECT  No results found for: LIPASE        Results from last 7 days   Lab Units 01/03/21  1717   PH, ARTERIAL pH units 7.360   PO2 ART mm Hg 131.3*   PCO2, ARTERIAL mm Hg 37.7   HCO3 ART mmol/L 21.3     Pathology  No results  found for: INTRAOP, PREDX, FINALDX, COMDX  COVID19   Date Value Ref Range Status   01/01/2021 Presumptive Negative Presumptive Negative - Ref. Range Final        Microbiology Results (last 10 days)     Procedure Component Value - Date/Time    Blood Culture - Blood, Arm, Left [[id number]]  (Abnormal) Collected: 01/01/21 2255    Lab Status: Final result Specimen: Blood from Arm, Left Updated: 01/04/21 1031     Blood Culture Escherichia coli ESBL     Comment: Consider infectious disease consult.  Susceptibility results may not correlate to clinical outcomes.  For ESBL-producing infections in the blood, a carbapenem is recommended as first-line therapy for optimal clinical outcomes.        Isolated from Aerobic and Anaerobic Bottles     Gram Stain Anaerobic Bottle Gram negative bacilli      Aerobic Bottle Gram negative bacilli    Narrative:      Refer to previous blood culture collected on 1/1/2021 2255 for MICs.    Blood Culture - Blood, Hand, Left [[id number]]  (Abnormal)  (Susceptibility) Collected: 01/01/21 2255    Lab Status: Final result Specimen: Blood from Hand, Left Updated: 01/04/21 1030     Blood Culture Escherichia coli ESBL     Comment: Consider infectious disease consult.  Susceptibility results may not correlate to clinical outcomes.  For ESBL-producing infections in the blood, a carbapenem is recommended as first-line therapy for optimal clinical outcomes.        Isolated from Aerobic and Anaerobic Bottles     Gram Stain Anaerobic Bottle Gram negative bacilli      Aerobic Bottle Gram negative bacilli    Susceptibility      Escherichia coli ESBL     ANA     Ertapenem Susceptible     Meropenem Susceptible                Susceptibility Comments     Escherichia coli ESBL    Cefazolin sensitivity will not be reported for Enterobacteriaceae in non-urine isolates. If cefazolin is preferred, please call the microbiology lab to request an E-test.  With the exception of urinary-sourced infections, aminoglycosides  should not be used as monotherapy.             Blood Culture ID, PCR - Blood, Hand, Left [[id number]]  (Abnormal) Collected: 01/01/21 2255    Lab Status: Final result Specimen: Blood from Hand, Left Updated: 01/02/21 1047     BCID, PCR Escherichia coli. Identification by BCID PCR.     BOTTLE TYPE Anaerobic Bottle    COVID-19, ABBOTT IN-HOUSE,NASAL Swab (NO TRANSPORT MEDIA) 2 HR TAT - Swab, Nasopharynx [[id number]]  (Normal) Collected: 01/01/21 2013    Lab Status: Final result Specimen: Swab from Nasopharynx Updated: 01/01/21 2037     COVID19 Presumptive Negative    Narrative:      Fact sheet for providers: https://www.fda.gov/media/443536/download     Fact sheet for patients: https://www.fda.gov/media/667673/download    Test performed by PCR.  If inconsistent with clinical signs and symptoms patient should be tested with different authorized molecular test.    Urine Culture - Urine, Urine, Catheter [[id number]]  (Abnormal)  (Susceptibility) Collected: 01/01/21 1852    Lab Status: Final result Specimen: Urine, Catheter Updated: 01/03/21 0208     Urine Culture >100,000 CFU/mL Escherichia coli ESBL     Comment: Consider infectious disease consult.  Susceptibility results may not correlate to clinical outcomes.       Susceptibility      Escherichia coli ESBL     ANA     Amikacin Susceptible     Ertapenem Susceptible     Gentamicin Resistant     Levofloxacin Resistant     Meropenem Susceptible     Nitrofurantoin Susceptible     Piperacillin + Tazobactam Susceptible     Tetracycline Resistant     Tobramycin Resistant     Trimethoprim + Sulfamethoxazole Resistant                          ECG/EMG Results (most recent)     Procedure Component Value Units Date/Time    ECG 12 Lead [[id number]] Collected: 01/01/21 1728     Updated: 01/01/21 1729     QT Interval 371 ms     Narrative:      HEART RATE= 87  bpm  RR Interval= 692  ms  IN Interval= 120  ms  P Horizontal Axis= 5  deg  P Front Axis= 54  deg  QRSD Interval= 95  ms  QT  Interval= 371  ms  QRS Axis= 23  deg  T Wave Axis= 76  deg  - ABNORMAL ECG -  Sinus rhythm  ST elevation, consider anterior injury  Electronically Signed By:   Date and Time of Study: 2021-01-01 17:28:09                    Ct Head Without Contrast    Result Date: 1/1/2021   1. Motion artifact which limits the study, especially to the skull base. 2. No large parenchymal hemorrhage. 3. There appears to be volume loss secondary to atrophy.  Electronically Signed By-Erick Bautista MD On:1/1/2021 6:13 PM This report was finalized on [id number] by  Erick Bautista MD.    Ct Cervical Spine Without Contrast    Result Date: 1/1/2021   1. Limited study due to marked motion artifact at the C4-C6 level. It is difficult to exclude an acute fracture or dislocation in this area. 2. Degenerative spondylosis and facet arthritis. 3. Varying degrees of canal and neural foraminal stenosis which was better appreciated on the previous CT exam where no motion artifact was apparent.  Electronically Signed By-Erick Bautista MD On:1/1/2021 6:34 PM This report was finalized on [id number] by  Erick Bautista MD.    Xr Chest 1 View    Result Date: 1/3/2021   1. No acute cardiopulmonary disease.   Electronically Signed By-Gera Young MD On:1/3/2021 6:28 PM This report was finalized on [id number] by  Gera Young MD.    Xr Chest 1 View    Result Date: 1/1/2021  No active disease.  Electronically Signed By-Erick Bautista MD On:1/1/2021 5:59 PM This report was finalized on [id number] by  Erick Bautista MD.    Ct Abdomen Pelvis Stone Protocol    Result Date: 1/4/2021   1. There is a small volume of scattered ascites, mild body wall edema and a small right-sided pleural effusion, which would indicate an abnormal fluid status. Liver margins are lobulated, which could indicate cirrhosis. Correlate for the possibility of liver, kidney or heart failure 2. Bilateral perinephric stranding, which is nonspecific. There is mild thickening of the urinary  bladder wall. This could be related to the patient's known urosepsis. 3. Mild osseous degenerative changes.  Electronically Signed By-Juanjo Pastor MD On:1/4/2021 2:27 PM This report was finalized on [id number] by  Juanjo Pastor MD.      I personally reviewed patient's x-ray films and my findings are: 0    I personally reviewed patient's EKG strip and my findings are: 0    Medication Review:   I have reviewed the patient's current medication list 01/05/21     Scheduled Meds  atenolol, 50 mg, Oral, Daily  divalproex, 125 mg, Oral, Daily  divalproex, 250 mg, Oral, Daily  divalproex, 500 mg, Oral, Nightly  enoxaparin, 30 mg, Subcutaneous, Daily  insulin glargine, 18 Units, Subcutaneous, Nightly  insulin lispro, 0-7 Units, Subcutaneous, TID AC  melatonin, 10 mg, Oral, Nightly  meropenem, 500 mg, Intravenous, Q8H  mirtazapine, 7.5 mg, Oral, Nightly  polyethylene glycol, 17 g, Oral, BID  QUEtiapine, 50 mg, Oral, Nightly  sodium chloride, 10 mL, Intravenous, Q12H  tamsulosin, 0.4 mg, Oral, Daily        Meds Infusions  sodium chloride, 125 mL/hr, Last Rate: 125 mL/hr (01/03/21 1457)        DVT prophylaxis  Mechanical Order History:     None      Pharmalogical Order History:      Ordered     Dose Route Frequency Stop    01/01/21 2129  enoxaparin (LOVENOX) syringe 40 mg      40 mg SC Daily --                Meds PRN  •  acetaminophen **OR** acetaminophen **OR** acetaminophen  •  dextrose  •  dextrose  •  glucagon (human recombinant)  •  insulin lispro **AND** insulin lispro  •  ipratropium-albuterol  •  ondansetron **OR** ondansetron  •  sodium chloride      Davide Mills MD  01/05/21  14:36 EST

## 2021-01-05 NOTE — PROGRESS NOTES
"   LOS: 4 days    Patient Care Team:  Provider, No Known as PCP - General  Vincent Cabezas MD as Consulting Physician (Hospitalist)      Subjective     Patient was seen and examined this morning  Feeling tired.  No specific complaint  Denies any chest pain, shortness of breath, nausea or vomiting    Objective     Vital Sign Min/Max for last 24 hours  Temp:  [97.4 °F (36.3 °C)-99 °F (37.2 °C)] 97.5 °F (36.4 °C)  Heart Rate:  [75-81] 75  Resp:  [18-24] 24  BP: (149-159)/(63-81) 153/63                       Flowsheet Rows      First Filed Value   Admission Height  182.9 cm (72\") Documented at 01/01/2021 1711   Admission Weight  90.3 kg (199 lb) Documented at 01/01/2021 1711          No intake/output data recorded.  I/O last 3 completed shifts:  In: 640 [P.O.:640]  Out: 200 [Urine:200]    Physical Exam:  Physical Exam    General.  Awake, alert  Head.  Atraumatic, normocephalic  Neck.  Supple.  No JVD  ENT.  Oral mucosa dry  Respiratory.  Few scattered wheezes  Cardiovascular.  Regular rhythm  Abdominal.  Obese, soft, nontender  Extremities.  No edema     LABS:  Lab Results   Component Value Date    CALCIUM 8.3 (L) 01/05/2021    PHOS 3.8 01/05/2021     Results from last 7 days   Lab Units 01/05/21  0351 01/04/21  0325 01/04/21  0324 01/03/21  1752  01/01/21  1733   MAGNESIUM mg/dL 2.0  --   --  1.9  --   --    SODIUM mmol/L 132*  --  133* 132*  --  133*   POTASSIUM mmol/L 4.5  --  5.1 5.4*   < > 4.7   CHLORIDE mmol/L 101  --  103 99   < > 99   CO2 mmol/L 21.0*  --  20.0* 24.0   < > 24.0   BUN mg/dL 75*  --  69* 66*   < > 35*   CREATININE mg/dL 2.96*  --  3.41* 3.44*   < > 2.24*   GLUCOSE mg/dL 93  --  79 107*   < > 115*   CALCIUM mg/dL 8.3*  --  8.7 8.7   < > 8.9   WBC 10*3/mm3 17.30* 21.20*  --  18.50*   < > 15.30*   HEMOGLOBIN g/dL 9.0* 9.6*  --  10.7*   < > 10.4*   HEMOGLOBIN, POC   --   --   --   --    < >  --    PLATELETS 10*3/mm3 115* 115*  --  126*   < > 162   ALT (SGPT) U/L  --   --  20 22  --  12   AST (SGOT) " U/L  --   --  34 37  --  22    < > = values in this interval not displayed.     Lab Results   Component Value Date    CKTOTAL 92 01/01/2021    TROPONINT <0.010 01/04/2021     Estimated Creatinine Clearance: 32.9 mL/min (A) (by C-G formula based on SCr of 2.96 mg/dL (H)).  Results from last 7 days   Lab Units 01/03/21  1717   PH, ARTERIAL pH units 7.360   PO2 ART mm Hg 131.3*   PCO2, ARTERIAL mm Hg 37.7   HCO3 ART mmol/L 21.3     Brief Urine Lab Results  (Last result in the past 365 days)      Color   Clarity   Blood   Leuk Est   Nitrite   Protein   CREAT   Urine HCG        01/01/21 1852 Yellow Turbid  Comment:  Result checked  Large (3+) Moderate (2+) Positive >=300 mg/dL (3+)             WEIGHTS:     Wt Readings from Last 1 Encounters:   01/05/21 0500 91 kg (200 lb 9.9 oz)   01/04/21 0500 88.8 kg (195 lb 12.3 oz)   01/03/21 2346 88.8 kg (195 lb 12.3 oz)   01/01/21 2230 86.1 kg (189 lb 13.1 oz)   01/01/21 1711 90.3 kg (199 lb)       atenolol, 50 mg, Oral, Daily  divalproex, 125 mg, Oral, Daily  divalproex, 250 mg, Oral, Daily  divalproex, 500 mg, Oral, Nightly  enoxaparin, 30 mg, Subcutaneous, Daily  insulin glargine, 18 Units, Subcutaneous, Nightly  insulin lispro, 0-7 Units, Subcutaneous, TID AC  melatonin, 10 mg, Oral, Nightly  meropenem, 500 mg, Intravenous, Q8H  mirtazapine, 7.5 mg, Oral, Nightly  polyethylene glycol, 17 g, Oral, BID  QUEtiapine, 50 mg, Oral, Nightly  sodium chloride, 10 mL, Intravenous, Q12H      sodium chloride, 125 mL/hr, Last Rate: 125 mL/hr (01/03/21 1457)        Assessment/Plan       1.  Acute kidney injury  Nonoliguric.  Renal function improving slowly  Continue IV hydration.  Electrolytes acceptable    2.  Hyperkalemia  Improved    3.  E. coli UTI with bacteremia  On IV meropenem  Possibly related to urinary retention.  Mild bladder wall thickening noticed on CT scan but no hydronephrosis  Start Flomax   CT scan also showed evidence of cirrhosis -work-up per primary team    4.   Hyponatremia  Mild.  Continue IV hydration with normal saline    Hermilo Wilson MD  01/05/21  08:35 EST

## 2021-01-05 NOTE — PLAN OF CARE
Pt resting overnight without issue on room air. Pills taken whole in applesauce. Multiple episodes of incontinence, and patient became irritable and anxious while bed was changed. External cath applied to bedside drainage. Will cont to monitor for safety.       Problem: Adult Inpatient Plan of Care  Goal: Plan of Care Review  Outcome: Ongoing, Progressing  Goal: Patient-Specific Goal (Individualized)  Outcome: Ongoing, Progressing  Goal: Absence of Hospital-Acquired Illness or Injury  Outcome: Ongoing, Progressing  Intervention: Identify and Manage Fall Risk  Recent Flowsheet Documentation  Taken 1/5/2021 0400 by Lisa Andino, RN  Safety Promotion/Fall Prevention:   activity supervised   assistive device/personal items within reach   clutter free environment maintained   fall prevention program maintained   lighting adjusted   nonskid shoes/slippers when out of bed   room organization consistent   safety round/check completed  Taken 1/5/2021 0200 by Lisa Andino, RN  Safety Promotion/Fall Prevention:   safety round/check completed   room organization consistent   nonskid shoes/slippers when out of bed   lighting adjusted   fall prevention program maintained   clutter free environment maintained   assistive device/personal items within reach   activity supervised  Taken 1/5/2021 0000 by Lisa Andino, RN  Safety Promotion/Fall Prevention:   safety round/check completed   nonskid shoes/slippers when out of bed   muscle strengthening facilitated   lighting adjusted   fall prevention program maintained   clutter free environment maintained   activity supervised   assistive device/personal items within reach  Taken 1/4/2021 2200 by Lisa Andino, RN  Safety Promotion/Fall Prevention:   safety round/check completed   room organization consistent   nonskid shoes/slippers when out of bed   mobility aid in reach   lighting adjusted   fall prevention program maintained   clutter free environment maintained    assistive device/personal items within reach   activity supervised  Taken 1/4/2021 2000 by Lisa Andino, RN  Safety Promotion/Fall Prevention:   toileting scheduled   safety round/check completed   room organization consistent   nonskid shoes/slippers when out of bed   mobility aid in reach   lighting adjusted   fall prevention program maintained   clutter free environment maintained   assistive device/personal items within reach   activity supervised  Intervention: Prevent Infection  Recent Flowsheet Documentation  Taken 1/5/2021 0400 by Lisa Andino, RN  Infection Prevention:   environmental surveillance performed   equipment surfaces disinfected   hand hygiene promoted   personal protective equipment utilized   rest/sleep promoted   single patient room provided  Taken 1/5/2021 0200 by Lisa Andino, RN  Infection Prevention:   environmental surveillance performed   equipment surfaces disinfected   hand hygiene promoted   personal protective equipment utilized   rest/sleep promoted   single patient room provided  Taken 1/5/2021 0000 by Lisa Andino, RN  Infection Prevention:   environmental surveillance performed   equipment surfaces disinfected   hand hygiene promoted   personal protective equipment utilized   rest/sleep promoted   single patient room provided  Taken 1/4/2021 2200 by Lisa Andino, RN  Infection Prevention:   environmental surveillance performed   equipment surfaces disinfected   hand hygiene promoted   personal protective equipment utilized   rest/sleep promoted   single patient room provided  Taken 1/4/2021 2000 by Lisa Andino, RN  Infection Prevention:   environmental surveillance performed   equipment surfaces disinfected   hand hygiene promoted   personal protective equipment utilized   rest/sleep promoted   single patient room provided  Goal: Optimal Comfort and Wellbeing  Outcome: Ongoing, Progressing  Intervention: Provide Person-Centered Care  Recent  Flowsheet Documentation  Taken 1/4/2021 2000 by Lisa Andino, RN  Trust Relationship/Rapport:   care explained   choices provided   emotional support provided   questions answered   questions encouraged   reassurance provided   thoughts/feelings acknowledged  Goal: Readiness for Transition of Care  Outcome: Ongoing, Progressing     Problem: Fall Injury Risk  Goal: Absence of Fall and Fall-Related Injury  Outcome: Ongoing, Progressing  Intervention: Identify and Manage Contributors to Fall Injury Risk  Recent Flowsheet Documentation  Taken 1/5/2021 0400 by Lisa Andino RN  Medication Review/Management: medications reviewed  Taken 1/5/2021 0200 by Lisa Andino RN  Medication Review/Management: medications reviewed  Taken 1/5/2021 0000 by Lisa Andino RN  Medication Review/Management: medications reviewed  Taken 1/4/2021 2200 by Lisa Andino RN  Medication Review/Management: medications reviewed  Taken 1/4/2021 2000 by Lisa Andino RN  Medication Review/Management: medications reviewed  Intervention: Promote Injury-Free Environment  Recent Flowsheet Documentation  Taken 1/5/2021 0400 by Lisa Andino, RN  Safety Promotion/Fall Prevention:   activity supervised   assistive device/personal items within reach   clutter free environment maintained   fall prevention program maintained   lighting adjusted   nonskid shoes/slippers when out of bed   room organization consistent   safety round/check completed  Taken 1/5/2021 0200 by Lisa Andino, RN  Safety Promotion/Fall Prevention:   safety round/check completed   room organization consistent   nonskid shoes/slippers when out of bed   lighting adjusted   fall prevention program maintained   clutter free environment maintained   assistive device/personal items within reach   activity supervised  Taken 1/5/2021 0000 by Lisa Andino, RN  Safety Promotion/Fall Prevention:   safety round/check completed   nonskid shoes/slippers when  out of bed   muscle strengthening facilitated   lighting adjusted   fall prevention program maintained   clutter free environment maintained   activity supervised   assistive device/personal items within reach  Taken 1/4/2021 2200 by Lisa Andino RN  Safety Promotion/Fall Prevention:   safety round/check completed   room organization consistent   nonskid shoes/slippers when out of bed   mobility aid in reach   lighting adjusted   fall prevention program maintained   clutter free environment maintained   assistive device/personal items within reach   activity supervised  Taken 1/4/2021 2000 by Lisa Andino RN  Safety Promotion/Fall Prevention:   toileting scheduled   safety round/check completed   room organization consistent   nonskid shoes/slippers when out of bed   mobility aid in reach   lighting adjusted   fall prevention program maintained   clutter free environment maintained   assistive device/personal items within reach   activity supervised     Problem: Skin Injury Risk Increased  Goal: Skin Health and Integrity  Outcome: Ongoing, Progressing  Intervention: Optimize Skin Protection  Recent Flowsheet Documentation  Taken 1/4/2021 2000 by Lisa Andino, RN  Pressure Reduction Techniques:   frequent weight shift encouraged   heels elevated off bed   weight shift assistance provided  Pressure Reduction Devices: pressure-redistributing mattress utilized  Skin Protection: incontinence pads utilized     Problem: UTI (Urinary Tract Infection)  Goal: Improved Infection Symptoms  Outcome: Ongoing, Progressing  Intervention: Prevent and Manage Infection Progression  Recent Flowsheet Documentation  Taken 1/5/2021 0400 by Lisa Andino RN  Infection Management: aseptic technique maintained  Taken 1/4/2021 2000 by Lisa Andino RN  Infection Management: aseptic technique maintained   Goal Outcome Evaluation:  Plan of Care Reviewed With: patient  Progress: improving

## 2021-01-05 NOTE — PLAN OF CARE
Goal Outcome Evaluation:  Plan of Care Reviewed With: patient  Progress: improving  Outcome Summary: Patient is doing well today but is very confused. Vitals stable. Will monitor.

## 2021-01-05 NOTE — THERAPY TREATMENT NOTE
Acute Care - Speech Language Pathology   Swallow Treatment Note  [facility]     Patient Name: Ronald Reyer  : 1955  MRN: [id number]  Today's Date: 2021               Admit Date: 2021    Visit Dx:     ICD-10-CM ICD-9-CM   1. Fall, initial encounter  W19.XXXA E888.9   2. Weakness  R53.1 780.79   3. Acute kidney injury (CMS/HCC)  N17.9 584.9   4. Urinary tract infection with hematuria, site unspecified  N39.0 599.0    R31.9 599.70     Patient Active Problem List   Diagnosis   • Fall   • SRAVAN (acute kidney injury) (CMS/HCC)   • Stage 3b chronic kidney disease   • UTI (urinary tract infection), bacterial   • Polypharmacy   • E coli bacteremia   • Acute respiratory failure with hypoxia (CMS/HCC)     Past Medical History:   Diagnosis Date   • Antisocial personality disorder (CMS/HCC)    • Bipolar disorder (CMS/HCC)    • Chronic kidney disease    • Convulsions (CMS/HCC)    • Diabetes mellitus (CMS/HCC)    • Hypertension    • Insomnia    • Renal disorder    • Schizophrenia (CMS/HCC)      History reviewed. No pertinent surgical history.     SWALLOW EVALUATION (last 72 hours)      SLP Adult Swallow Evaluation     Row Name 21 1500          Document Type  therapy note (daily note)  -    Patient Observations  alert;cooperative;agree to therapy  -    Patient/Family/Caregiver Comments/Observations  Pt sitting up in bed with lunch tray in front of him. Noted pt with rapid shallow breathing prior to any PO. RN reports that this has worsened throughout the day. Pt with orders for STAT labs and chest CT - results pending.   -    Patient Effort  good  -             Daily Summary of Progress (SLP)  progress toward functional goals as expected  -    SLP Swallowing Diagnosis  mild;oral dysphagia;suspected pharyngeal dysphagia  -    Functional Impact  risk of aspiration/pneumonia  -    Rehab Potential/Prognosis, Swallowing  good, to achieve stated therapy goals  -    Swallow Criteria for Skilled Therapeutic  Interventions Met  demonstrates skilled criteria  -    Plan for Continued Treatment (SLP)  Continue mech soft with thin liquids, assist with feeding. Pt should be fully upright and take small bites/sips. Pt may benefit from VFSS to assess for dysphagia and to determine his safest/least restrictive diet d/t overall clinical presentation this date.   -          Therapy Frequency (Swallow)  PRN  -    Predicted Duration Therapy Intervention (Days)  until discharge  -    SLP Diet Recommendation  ground;thin liquids  -    Recommended Diagnostics  VFSS (MBS)  -    Recommended Precautions and Strategies  upright posture during/after eating;small bites of food and sips of liquid;1:1 supervision;reflux precautions;general aspiration precautions  -    Oral Care Recommendations  Oral Care before breakfast, after meals and PRN  -    SLP Rec. for Method of Medication Administration  with pudding or applesauce  -    Monitor for Signs of Aspiration  yes;notify SLP if any concerns  -          Oral Nutrition/Hydration Goal Selection (SLP)  oral nutrition/hydration, SLP goal 1;oral nutrition/hydration, SLP goal 2  -          Oral Nutrition/Hydration Goal 1, SLP  Pt will tolerate recommended diet with no s/s aspiration   -    Time Frame (Oral Nutrition/Hydration Goal 1, SLP)  by discharge  -    Barriers (Oral Nutrition/Hydration Goal 1, SLP)  Pt participated in limited meal assessment this date due to need to urinate. Pt largely appeared to tolerate mech soft and thins based on today's session; however pt with x1 instance of coughing with large drink of liquids. Pt may benefit from VFSS tomorrow as appropriate.   -    Progress/Outcomes (Oral Nutrition/Hydration Goal 1, SLP)  goal ongoing  -          Oral Nutrition/Hydration Goal 2, SLP  Pt will verbalize/demonstrate understanding of safe swallow strategies & techniques with max cues as required.   -    Time Frame (Oral Nutrition/Hydration Goal 2, SLP)  2  days;3 days  -MF    Barriers (Oral Nutrition/Hydration Goal 2, SLP)  Pt seen for meal assessment at lunch time meal. Pt did not attempt to feed himself, so SLP provided all trials. Pt accepted trials of thins via straw, puree, and mech soft. Noted slightly slow mastication of mech soft solids, however clear his oral cavity independently. No residue observed. Pt coughed following 1/5 trials of thin liquids due to pt impulsively taking large consecutive drinks via straw. No further overt s/s of aspiration observed at any time. However, pt noted to have shallow, dyspneic breathing throughout session. Pt to have STAT CT chest this date. Will continue to follow.   -    Progress/Outcomes (Oral Nutrition/Hydration Goal 2, SLP)  goal ongoing  -      User Key  (r) = Recorded By, (t) = Taken By, (c) = Cosigned By    Initials Name Effective Dates    Sallie Mcclain SLP 03/01/19 -      Concha Wong SLP 06/17/19 -           EDUCATION  The patient has been educated in the following areas:   Modified Diet Instruction.    SLP Recommendation and Plan  SLP Swallowing Diagnosis: mild, oral dysphagia, suspected pharyngeal dysphagia  SLP Diet Recommendation: ground, thin liquids  Recommended Precautions and Strategies: upright posture during/after eating, small bites of food and sips of liquid, 1:1 supervision, reflux precautions, general aspiration precautions  SLP Rec. for Method of Medication Administration: with pudding or applesauce     Monitor for Signs of Aspiration: yes, notify SLP if any concerns  Recommended Diagnostics: VFSS (MBS)  Swallow Criteria for Skilled Therapeutic Interventions Met: demonstrates skilled criteria     Rehab Potential/Prognosis, Swallowing: good, to achieve stated therapy goals  Therapy Frequency (Swallow): PRN  Predicted Duration Therapy Intervention (Days): until discharge     Daily Summary of Progress (SLP): progress toward functional goals as expected    Plan for Continued Treatment  (SLP): Continue mech soft with thin liquids, assist with feeding. Pt should be fully upright and take small bites/sips. Pt may benefit from VFSS to assess for dysphagia and to determine his safest/least restrictive diet d/t overall clinical presentation this date.                    SLP GOALS     Row Name 01/05/21 1500 01/04/21 1100          Oral Nutrition/Hydration Goal 1 (SLP)    Oral Nutrition/Hydration Goal 1, SLP  Pt will tolerate recommended diet with no s/s aspiration   -  Pt will tolerate recommended diet with no s/s aspiration   -MC     Time Frame (Oral Nutrition/Hydration Goal 1, SLP)  by discharge  -MF  by discharge  -     Barriers (Oral Nutrition/Hydration Goal 1, SLP)  Pt participated in limited meal assessment this date due to need to urinate. Pt largely appeared to tolerate mech soft and thins based on today's session; however pt with x1 instance of coughing with large drink of liquids. Pt may benefit from VFSS tomorrow as appropriate.   -MF  --     Progress/Outcomes (Oral Nutrition/Hydration Goal 1, SLP)  goal ongoing  -MF  --        Oral Nutrition/Hydration Goal 2 (SLP)    Oral Nutrition/Hydration Goal 2, SLP  Pt will verbalize/demonstrate understanding of safe swallow strategies & techniques with max cues as required.   -  Pt will verbalize/demonstrate understanding of safe swallow strategies & techniques with max cues as required.   -     Time Frame (Oral Nutrition/Hydration Goal 2, SLP)  2 days;3 days  -MF  2 days;3 days  -     Barriers (Oral Nutrition/Hydration Goal 2, SLP)  Pt seen for meal assessment at lunch time meal. Pt did not attempt to feed himself, so SLP provided all trials. Pt accepted trials of thins via straw, puree, and mech soft. Noted slightly slow mastication of mech soft solids, however clear his oral cavity independently. No residue observed. Pt coughed following 1/5 trials of thin liquids due to pt impulsively taking large consecutive drinks via straw. No further  overt s/s of aspiration observed at any time. However, pt noted to have shallow, dyspneic breathing throughout session. Pt to have STAT CT chest this date. Will continue to follow.   -MF  --     Progress/Outcomes (Oral Nutrition/Hydration Goal 2, SLP)  goal ongoing  -  --       User Key  (r) = Recorded By, (t) = Taken By, (c) = Cosigned By    Initials Name Provider Type    Sallie Mcclain SLP Speech and Language Pathologist    Concha Grullon SLP Speech and Language Pathologist           Patient was not wearing a face mask during this therapy encounter. The patient's mask was removed to allow po trials to be administered for purposes of this assessment. The patient's mask was replaced prior to being transported back up to their room. Therapist used appropriate personal protective equipment including gown, eye protection, mask and gloves.  Mask used was standard procedure mask. Appropriate PPE was worn during the entire therapy session. Hand hygiene was completed before and after therapy session. Patient is not in enhanced droplet precautions.       Time Calculation:       Therapy Charges for Today     Code Description Service Date Service Provider Modifiers Qty    [id number]  ST TREATMENT SWALLOW 4 1/5/2021 Concha Wong SLP GN 1               JOVAN Ovalle  1/5/2021

## 2021-01-06 ENCOUNTER — APPOINTMENT (OUTPATIENT)
Dept: CARDIOLOGY | Facility: HOSPITAL | Age: 66
End: 2021-01-06

## 2021-01-06 ENCOUNTER — APPOINTMENT (OUTPATIENT)
Dept: GENERAL RADIOLOGY | Facility: HOSPITAL | Age: 66
End: 2021-01-06

## 2021-01-06 ENCOUNTER — APPOINTMENT (OUTPATIENT)
Dept: NUCLEAR MEDICINE | Facility: HOSPITAL | Age: 66
End: 2021-01-06

## 2021-01-06 PROBLEM — I26.99 ACUTE PULMONARY EMBOLISM WITHOUT ACUTE COR PULMONALE (HCC): Status: RESOLVED | Noted: 2021-01-05 | Resolved: 2021-01-06

## 2021-01-06 LAB
ALBUMIN SERPL-MCNC: 2 G/DL (ref 3.5–5.2)
ANION GAP SERPL CALCULATED.3IONS-SCNC: 8 MMOL/L (ref 5–15)
ARTERIAL PATENCY WRIST A: POSITIVE
ATMOSPHERIC PRESS: ABNORMAL MM[HG]
BASE EXCESS BLDA CALC-SCNC: -4.2 MMOL/L (ref 0–3)
BDY SITE: ABNORMAL
BH CV ECHO MEAS - % IVS THICK: 73 %
BH CV ECHO MEAS - % LVPW THICK: 70.5 %
BH CV ECHO MEAS - ACS: 2 CM
BH CV ECHO MEAS - AO MAX PG (FULL): 2.1 MMHG
BH CV ECHO MEAS - AO MAX PG: 7.4 MMHG
BH CV ECHO MEAS - AO MEAN PG (FULL): 1.2 MMHG
BH CV ECHO MEAS - AO MEAN PG: 4.1 MMHG
BH CV ECHO MEAS - AO ROOT AREA (BSA CORRECTED): 1.5
BH CV ECHO MEAS - AO ROOT AREA: 8.2 CM^2
BH CV ECHO MEAS - AO ROOT DIAM: 3.2 CM
BH CV ECHO MEAS - AO V2 MAX: 136.3 CM/SEC
BH CV ECHO MEAS - AO V2 MEAN: 97.3 CM/SEC
BH CV ECHO MEAS - AO V2 VTI: 26.9 CM
BH CV ECHO MEAS - AVA(I,A): 2.7 CM^2
BH CV ECHO MEAS - AVA(I,D): 2.7 CM^2
BH CV ECHO MEAS - AVA(V,A): 2.9 CM^2
BH CV ECHO MEAS - AVA(V,D): 2.9 CM^2
BH CV ECHO MEAS - BSA(HAYCOCK): 2.2 M^2
BH CV ECHO MEAS - BSA: 2.1 M^2
BH CV ECHO MEAS - BZI_BMI: 27.1 KILOGRAMS/M^2
BH CV ECHO MEAS - BZI_METRIC_HEIGHT: 182.9 CM
BH CV ECHO MEAS - BZI_METRIC_WEIGHT: 90.7 KG
BH CV ECHO MEAS - EDV(CUBED): 165.4 ML
BH CV ECHO MEAS - EDV(MOD-SP4): 109 ML
BH CV ECHO MEAS - EDV(TEICH): 146.7 ML
BH CV ECHO MEAS - EF(CUBED): 62.9 %
BH CV ECHO MEAS - EF(MOD-BP): 65 %
BH CV ECHO MEAS - EF(MOD-SP4): 64.8 %
BH CV ECHO MEAS - EF(TEICH): 53.9 %
BH CV ECHO MEAS - ESV(CUBED): 61.3 ML
BH CV ECHO MEAS - ESV(MOD-SP4): 38.3 ML
BH CV ECHO MEAS - ESV(TEICH): 67.7 ML
BH CV ECHO MEAS - FS: 28.2 %
BH CV ECHO MEAS - IVS/LVPW: 1
BH CV ECHO MEAS - IVSD: 1 CM
BH CV ECHO MEAS - IVSS: 1.8 CM
BH CV ECHO MEAS - LA DIMENSION(2D): 4 CM
BH CV ECHO MEAS - LV DIASTOLIC VOL/BSA (35-75): 51.2 ML/M^2
BH CV ECHO MEAS - LV MASS(C)D: 216.1 GRAMS
BH CV ECHO MEAS - LV MASS(C)DI: 101.5 GRAMS/M^2
BH CV ECHO MEAS - LV MASS(C)S: 289.9 GRAMS
BH CV ECHO MEAS - LV MASS(C)SI: 136.1 GRAMS/M^2
BH CV ECHO MEAS - LV MAX PG: 5.3 MMHG
BH CV ECHO MEAS - LV MEAN PG: 2.9 MMHG
BH CV ECHO MEAS - LV SYSTOLIC VOL/BSA (12-30): 18 ML/M^2
BH CV ECHO MEAS - LV V1 MAX: 115.1 CM/SEC
BH CV ECHO MEAS - LV V1 MEAN: 78.8 CM/SEC
BH CV ECHO MEAS - LV V1 VTI: 21.3 CM
BH CV ECHO MEAS - LVIDD: 5.5 CM
BH CV ECHO MEAS - LVIDS: 3.9 CM
BH CV ECHO MEAS - LVOT AREA: 3.5 CM^2
BH CV ECHO MEAS - LVOT DIAM: 2.1 CM
BH CV ECHO MEAS - LVPWD: 1 CM
BH CV ECHO MEAS - LVPWS: 1.7 CM
BH CV ECHO MEAS - MV A MAX VEL: 83.3 CM/SEC
BH CV ECHO MEAS - MV DEC SLOPE: 418.7 CM/SEC^2
BH CV ECHO MEAS - MV DEC TIME: 0.23 SEC
BH CV ECHO MEAS - MV E MAX VEL: 96.7 CM/SEC
BH CV ECHO MEAS - MV E/A: 1.2
BH CV ECHO MEAS - MV MAX PG: 4.1 MMHG
BH CV ECHO MEAS - MV MEAN PG: 1.7 MMHG
BH CV ECHO MEAS - MV V2 MAX: 101.4 CM/SEC
BH CV ECHO MEAS - MV V2 MEAN: 61.5 CM/SEC
BH CV ECHO MEAS - MV V2 VTI: 31.3 CM
BH CV ECHO MEAS - MVA(VTI): 2.4 CM^2
BH CV ECHO MEAS - PA ACC TIME: 0.14 SEC
BH CV ECHO MEAS - PA MAX PG (FULL): 1.1 MMHG
BH CV ECHO MEAS - PA MAX PG: 5.7 MMHG
BH CV ECHO MEAS - PA MEAN PG (FULL): 1.1 MMHG
BH CV ECHO MEAS - PA MEAN PG: 3.2 MMHG
BH CV ECHO MEAS - PA PR(ACCEL): 15.2 MMHG
BH CV ECHO MEAS - PA V2 MAX: 118.9 CM/SEC
BH CV ECHO MEAS - PA V2 MEAN: 86.5 CM/SEC
BH CV ECHO MEAS - PA V2 VTI: 29 CM
BH CV ECHO MEAS - RAP SYSTOLE: 8 MMHG
BH CV ECHO MEAS - RV MAX PG: 4.5 MMHG
BH CV ECHO MEAS - RV MEAN PG: 2.1 MMHG
BH CV ECHO MEAS - RV V1 MAX: 106.6 CM/SEC
BH CV ECHO MEAS - RV V1 MEAN: 66.4 CM/SEC
BH CV ECHO MEAS - RV V1 VTI: 23.9 CM
BH CV ECHO MEAS - RVDD: 2.7 CM
BH CV ECHO MEAS - RVSP: 50.6 MMHG
BH CV ECHO MEAS - SI(AO): 104.1 ML/M^2
BH CV ECHO MEAS - SI(CUBED): 48.8 ML/M^2
BH CV ECHO MEAS - SI(LVOT): 34.7 ML/M^2
BH CV ECHO MEAS - SI(MOD-SP4): 33.2 ML/M^2
BH CV ECHO MEAS - SI(TEICH): 37.1 ML/M^2
BH CV ECHO MEAS - SV(AO): 221.8 ML
BH CV ECHO MEAS - SV(CUBED): 104.1 ML
BH CV ECHO MEAS - SV(LVOT): 74 ML
BH CV ECHO MEAS - SV(MOD-SP4): 70.7 ML
BH CV ECHO MEAS - SV(TEICH): 79.1 ML
BH CV ECHO MEAS - TR MAX VEL: 326.3 CM/SEC
BH CV LOWER VASCULAR LEFT COMMON FEMORAL AUGMENT: NORMAL
BH CV LOWER VASCULAR LEFT COMMON FEMORAL COMPETENT: NORMAL
BH CV LOWER VASCULAR LEFT COMMON FEMORAL COMPRESS: NORMAL
BH CV LOWER VASCULAR LEFT COMMON FEMORAL PHASIC: NORMAL
BH CV LOWER VASCULAR LEFT COMMON FEMORAL SPONT: NORMAL
BH CV LOWER VASCULAR LEFT DISTAL FEMORAL COMPRESS: NORMAL
BH CV LOWER VASCULAR LEFT GASTRONEMIUS COMPRESS: NORMAL
BH CV LOWER VASCULAR LEFT GREATER SAPH AK COMPRESS: NORMAL
BH CV LOWER VASCULAR LEFT GREATER SAPH BK COMPRESS: NORMAL
BH CV LOWER VASCULAR LEFT LESSER SAPH COMPRESS: NORMAL
BH CV LOWER VASCULAR LEFT MID FEMORAL AUGMENT: NORMAL
BH CV LOWER VASCULAR LEFT MID FEMORAL COMPETENT: NORMAL
BH CV LOWER VASCULAR LEFT MID FEMORAL COMPRESS: NORMAL
BH CV LOWER VASCULAR LEFT MID FEMORAL PHASIC: NORMAL
BH CV LOWER VASCULAR LEFT MID FEMORAL SPONT: NORMAL
BH CV LOWER VASCULAR LEFT PERONEAL COMPRESS: NORMAL
BH CV LOWER VASCULAR LEFT POPLITEAL AUGMENT: NORMAL
BH CV LOWER VASCULAR LEFT POPLITEAL COMPETENT: NORMAL
BH CV LOWER VASCULAR LEFT POPLITEAL COMPRESS: NORMAL
BH CV LOWER VASCULAR LEFT POPLITEAL PHASIC: NORMAL
BH CV LOWER VASCULAR LEFT POPLITEAL SPONT: NORMAL
BH CV LOWER VASCULAR LEFT POSTERIOR TIBIAL COMPRESS: NORMAL
BH CV LOWER VASCULAR LEFT PROXIMAL FEMORAL COMPRESS: NORMAL
BH CV LOWER VASCULAR LEFT SAPHENOFEMORAL JUNCTION COMPRESS: NORMAL
BH CV LOWER VASCULAR RIGHT COMMON FEMORAL AUGMENT: NORMAL
BH CV LOWER VASCULAR RIGHT COMMON FEMORAL COMPETENT: NORMAL
BH CV LOWER VASCULAR RIGHT COMMON FEMORAL COMPRESS: NORMAL
BH CV LOWER VASCULAR RIGHT COMMON FEMORAL PHASIC: NORMAL
BH CV LOWER VASCULAR RIGHT COMMON FEMORAL SPONT: NORMAL
BH CV LOWER VASCULAR RIGHT DISTAL FEMORAL COMPRESS: NORMAL
BH CV LOWER VASCULAR RIGHT GASTRONEMIUS COMPRESS: NORMAL
BH CV LOWER VASCULAR RIGHT GREATER SAPH AK COMPRESS: NORMAL
BH CV LOWER VASCULAR RIGHT GREATER SAPH BK COMPRESS: NORMAL
BH CV LOWER VASCULAR RIGHT LESSER SAPH COMPRESS: NORMAL
BH CV LOWER VASCULAR RIGHT MID FEMORAL AUGMENT: NORMAL
BH CV LOWER VASCULAR RIGHT MID FEMORAL COMPETENT: NORMAL
BH CV LOWER VASCULAR RIGHT MID FEMORAL COMPRESS: NORMAL
BH CV LOWER VASCULAR RIGHT MID FEMORAL PHASIC: NORMAL
BH CV LOWER VASCULAR RIGHT MID FEMORAL SPONT: NORMAL
BH CV LOWER VASCULAR RIGHT PERONEAL COMPRESS: NORMAL
BH CV LOWER VASCULAR RIGHT POPLITEAL AUGMENT: NORMAL
BH CV LOWER VASCULAR RIGHT POPLITEAL COMPETENT: NORMAL
BH CV LOWER VASCULAR RIGHT POPLITEAL COMPRESS: NORMAL
BH CV LOWER VASCULAR RIGHT POPLITEAL PHASIC: NORMAL
BH CV LOWER VASCULAR RIGHT POPLITEAL SPONT: NORMAL
BH CV LOWER VASCULAR RIGHT POSTERIOR TIBIAL COMPRESS: NORMAL
BH CV LOWER VASCULAR RIGHT PROXIMAL FEMORAL COMPRESS: NORMAL
BH CV LOWER VASCULAR RIGHT SAPHENOFEMORAL JUNCTION COMPRESS: NORMAL
BUN SERPL-MCNC: 72 MG/DL (ref 8–23)
BUN/CREAT SERPL: 27.4 (ref 7–25)
CALCIUM SPEC-SCNC: 8.4 MG/DL (ref 8.6–10.5)
CHLORIDE SERPL-SCNC: 107 MMOL/L (ref 98–107)
CO2 BLDA-SCNC: 21.2 MMOL/L (ref 22–29)
CO2 SERPL-SCNC: 21 MMOL/L (ref 22–29)
CREAT SERPL-MCNC: 2.63 MG/DL (ref 0.76–1.27)
DEPRECATED RDW RBC AUTO: 52.5 FL (ref 37–54)
ERYTHROCYTE [DISTWIDTH] IN BLOOD BY AUTOMATED COUNT: 16.7 % (ref 12.3–15.4)
GFR SERPL CREATININE-BSD FRML MDRD: 25 ML/MIN/1.73
GLUCOSE BLDC GLUCOMTR-MCNC: 141 MG/DL (ref 70–105)
GLUCOSE BLDC GLUCOMTR-MCNC: 71 MG/DL (ref 70–105)
GLUCOSE BLDC GLUCOMTR-MCNC: 78 MG/DL (ref 70–105)
GLUCOSE BLDC GLUCOMTR-MCNC: 85 MG/DL (ref 70–105)
GLUCOSE SERPL-MCNC: 95 MG/DL (ref 65–99)
HCO3 BLDA-SCNC: 20.2 MMOL/L (ref 21–28)
HCT VFR BLD AUTO: 27.4 % (ref 37.5–51)
HEMODILUTION: NO
HGB BLD-MCNC: 9 G/DL (ref 13–17.7)
LV EF 2D ECHO EST: 60 %
MAGNESIUM SERPL-MCNC: 2.1 MG/DL (ref 1.6–2.4)
MAXIMAL PREDICTED HEART RATE: 157 BPM
MCH RBC QN AUTO: 29.2 PG (ref 26.6–33)
MCHC RBC AUTO-ENTMCNC: 33 G/DL (ref 31.5–35.7)
MCV RBC AUTO: 88.5 FL (ref 79–97)
MODALITY: ABNORMAL
PCO2 BLDA: 33.1 MM HG (ref 35–48)
PH BLDA: 7.39 PH UNITS (ref 7.35–7.45)
PHOSPHATE SERPL-MCNC: 4.1 MG/DL (ref 2.5–4.5)
PLATELET # BLD AUTO: 142 10*3/MM3 (ref 140–450)
PMV BLD AUTO: 7.4 FL (ref 6–12)
PO2 BLDA: 63.4 MM HG (ref 83–108)
POTASSIUM SERPL-SCNC: 4.7 MMOL/L (ref 3.5–5.2)
RBC # BLD AUTO: 3.1 10*6/MM3 (ref 4.14–5.8)
SAO2 % BLDCOA: 92 % (ref 94–98)
SARS-COV-2 RNA PNL SPEC NAA+PROBE: NOT DETECTED
SODIUM SERPL-SCNC: 136 MMOL/L (ref 136–145)
STRESS TARGET HR: 133 BPM
WBC # BLD AUTO: 14.4 10*3/MM3 (ref 3.4–10.8)

## 2021-01-06 PROCEDURE — 94799 UNLISTED PULMONARY SVC/PX: CPT

## 2021-01-06 PROCEDURE — 85027 COMPLETE CBC AUTOMATED: CPT | Performed by: INTERNAL MEDICINE

## 2021-01-06 PROCEDURE — 99233 SBSQ HOSP IP/OBS HIGH 50: CPT | Performed by: INTERNAL MEDICINE

## 2021-01-06 PROCEDURE — 25010000002 ENOXAPARIN PER 10 MG: Performed by: INTERNAL MEDICINE

## 2021-01-06 PROCEDURE — A9540 TC99M MAA: HCPCS | Performed by: INTERNAL MEDICINE

## 2021-01-06 PROCEDURE — 92611 MOTION FLUOROSCOPY/SWALLOW: CPT

## 2021-01-06 PROCEDURE — 97530 THERAPEUTIC ACTIVITIES: CPT

## 2021-01-06 PROCEDURE — 0 TECHNETIUM TC99M PYROPHOSPHATE: Performed by: INTERNAL MEDICINE

## 2021-01-06 PROCEDURE — 83735 ASSAY OF MAGNESIUM: CPT | Performed by: INTERNAL MEDICINE

## 2021-01-06 PROCEDURE — U0003 INFECTIOUS AGENT DETECTION BY NUCLEIC ACID (DNA OR RNA); SEVERE ACUTE RESPIRATORY SYNDROME CORONAVIRUS 2 (SARS-COV-2) (CORONAVIRUS DISEASE [COVID-19]), AMPLIFIED PROBE TECHNIQUE, MAKING USE OF HIGH THROUGHPUT TECHNOLOGIES AS DESCRIBED BY CMS-2020-01-R: HCPCS | Performed by: INTERNAL MEDICINE

## 2021-01-06 PROCEDURE — 82803 BLOOD GASES ANY COMBINATION: CPT

## 2021-01-06 PROCEDURE — 93306 TTE W/DOPPLER COMPLETE: CPT | Performed by: INTERNAL MEDICINE

## 2021-01-06 PROCEDURE — 25010000002 SULFUR HEXAFLUORIDE MICROSPH 60.7-25 MG RECONSTITUTED SUSPENSION: Performed by: INTERNAL MEDICINE

## 2021-01-06 PROCEDURE — 92526 ORAL FUNCTION THERAPY: CPT

## 2021-01-06 PROCEDURE — 93306 TTE W/DOPPLER COMPLETE: CPT

## 2021-01-06 PROCEDURE — 82962 GLUCOSE BLOOD TEST: CPT

## 2021-01-06 PROCEDURE — 36600 WITHDRAWAL OF ARTERIAL BLOOD: CPT

## 2021-01-06 PROCEDURE — 25010000002 MEROPENEM PER 100 MG: Performed by: INTERNAL MEDICINE

## 2021-01-06 PROCEDURE — 74230 X-RAY XM SWLNG FUNCJ C+: CPT

## 2021-01-06 PROCEDURE — 63710000001 INSULIN GLARGINE PER 5 UNITS: Performed by: NURSE PRACTITIONER

## 2021-01-06 PROCEDURE — A9538 TC99M PYROPHOSPHATE: HCPCS | Performed by: INTERNAL MEDICINE

## 2021-01-06 PROCEDURE — 93970 EXTREMITY STUDY: CPT

## 2021-01-06 PROCEDURE — 0 TECHNETIUM ALBUMIN AGGREGATED: Performed by: INTERNAL MEDICINE

## 2021-01-06 PROCEDURE — 80069 RENAL FUNCTION PANEL: CPT | Performed by: INTERNAL MEDICINE

## 2021-01-06 PROCEDURE — 78582 LUNG VENTILAT&PERFUS IMAGING: CPT

## 2021-01-06 RX ADMIN — POLYETHYLENE GLYCOL 3350 17 G: 17 POWDER, FOR SOLUTION ORAL at 11:15

## 2021-01-06 RX ADMIN — IPRATROPIUM BROMIDE AND ALBUTEROL SULFATE 3 ML: 2.5; .5 SOLUTION RESPIRATORY (INHALATION) at 20:00

## 2021-01-06 RX ADMIN — IPRATROPIUM BROMIDE AND ALBUTEROL SULFATE 3 ML: 2.5; .5 SOLUTION RESPIRATORY (INHALATION) at 06:32

## 2021-01-06 RX ADMIN — DIVALPROEX SODIUM 125 MG: 125 TABLET, DELAYED RELEASE ORAL at 16:30

## 2021-01-06 RX ADMIN — TECHNETIUM TC99M PYROPHOSPHATE 1 DOSE: 12 INJECTION INTRAVENOUS at 09:15

## 2021-01-06 RX ADMIN — KIT FOR THE PREPARATION OF TECHNETIUM TC 99M ALBUMIN AGGREGATED 1 DOSE: 2.5 INJECTION, POWDER, FOR SOLUTION INTRAVENOUS at 09:15

## 2021-01-06 RX ADMIN — Medication 10 ML: at 20:32

## 2021-01-06 RX ADMIN — ENOXAPARIN SODIUM 90 MG: 100 INJECTION SUBCUTANEOUS at 16:31

## 2021-01-06 RX ADMIN — DIVALPROEX SODIUM 500 MG: 500 TABLET, DELAYED RELEASE ORAL at 20:32

## 2021-01-06 RX ADMIN — MEROPENEM 500 MG: 500 INJECTION, POWDER, FOR SOLUTION INTRAVENOUS at 03:01

## 2021-01-06 RX ADMIN — INSULIN GLARGINE 18 UNITS: 100 INJECTION, SOLUTION SUBCUTANEOUS at 20:32

## 2021-01-06 RX ADMIN — BARIUM SULFATE 50 ML: 400 SUSPENSION ORAL at 15:15

## 2021-01-06 RX ADMIN — QUETIAPINE FUMARATE 50 MG: 25 TABLET ORAL at 20:32

## 2021-01-06 RX ADMIN — MEROPENEM 500 MG: 500 INJECTION, POWDER, FOR SOLUTION INTRAVENOUS at 20:31

## 2021-01-06 RX ADMIN — SULFUR HEXAFLUORIDE 2 ML: KIT at 11:30

## 2021-01-06 RX ADMIN — Medication 10 MG: at 20:32

## 2021-01-06 RX ADMIN — MEROPENEM 500 MG: 500 INJECTION, POWDER, FOR SOLUTION INTRAVENOUS at 11:14

## 2021-01-06 RX ADMIN — Medication 10 ML: at 11:14

## 2021-01-06 RX ADMIN — TAMSULOSIN HYDROCHLORIDE 0.4 MG: 0.4 CAPSULE ORAL at 11:14

## 2021-01-06 RX ADMIN — MIRTAZAPINE 7.5 MG: 7.5 TABLET ORAL at 20:31

## 2021-01-06 RX ADMIN — ATENOLOL 100 MG: 50 TABLET ORAL at 11:15

## 2021-01-06 RX ADMIN — POLYETHYLENE GLYCOL 3350 17 G: 17 POWDER, FOR SOLUTION ORAL at 21:29

## 2021-01-06 RX ADMIN — DIVALPROEX SODIUM 250 MG: 250 TABLET, DELAYED RELEASE ORAL at 11:14

## 2021-01-06 NOTE — PROGRESS NOTES
"PULMONARY CRITICAL CARE Progress  NOTE      PATIENT IDENTIFICATION:  Name: Reyer, Ronald  MRN: [id number]  :  1955     Age: 65 y.o.  Sex: male    DATE OF Note:  2021   Referring Physician: Davide Mills MD                  Subjective:   More tachypneic today  More weak and fatigued  No nausea or vomiting, no change in bowel habit, no dysuria,  no new  skin rash or itching.      Objective:  tMax 24 hrs: Temp (24hrs), Av.3 °F (37.4 °C), Min:97.5 °F (36.4 °C), Max:100.4 °F (38 °C)      Vitals Ranges:   Temp:  [97.5 °F (36.4 °C)-100.4 °F (38 °C)] 100.1 °F (37.8 °C)  Heart Rate:  [75-82] 82  Resp:  [18-24] 20  BP: (149-168)/(63-78) 168/72    Intake and Output Last 3 Shifts:   I/O last 3 completed shifts:  In: 880 [P.O.:880]  Out: 125 [Urine:125]    Exam:  /72   Pulse 82   Temp 100.1 °F (37.8 °C) (Axillary)   Resp 20   Ht 182.9 cm (72\")   Wt 91 kg (200 lb 9.9 oz)   SpO2 95%   BMI 27.21 kg/m²     General Appearance:   Looks fatigue tachypneic  HEENT:  Normocephalic, without obvious abnormality, Conjunctiva/corneas clear,.  Normal external ear canals, Nares normal, no drainage     Neck:  Supple, symmetrical, trachea midline. No JVD.  Lungs /Chest wall:   Bilateral basal rhonchi, respirations unlabored symmetrical wall movement.     Heart:  Regular rate and rhythm, systolic murmur PMI left sternal border  Abdomen: Soft, non-tender, no masses, no organomegaly.    Extremities: Trace edema no clubbing or Cyanosis        Medications:    Current Facility-Administered Medications:   •  acetaminophen (TYLENOL) tablet 650 mg, 650 mg, Oral, Q4H PRN, 650 mg at 21 0953 **OR** acetaminophen (TYLENOL) 160 MG/5ML solution 650 mg, 650 mg, Oral, Q4H PRN **OR** acetaminophen (TYLENOL) suppository 650 mg, 650 mg, Rectal, Q4H PRN, Anita Farnsworth, EFRAIN, 650 mg at 215  •  [START ON 2021] atenolol (TENORMIN) tablet 100 mg, 100 mg, Oral, Daily, Davide Mills MD  •  atenolol (TENORMIN) tablet 50 mg, 50 mg, " Oral, Once, Davide Mills MD  •  dextrose (D50W) 25 g/ 50mL Intravenous Solution 25 g, 25 g, Intravenous, Q15 Min PRN, Anita Farnsworth FNP  •  dextrose (GLUTOSE) oral gel 15 g, 15 g, Oral, Q15 Min PRN, Anita Farnsworth FNP  •  divalproex (DEPAKOTE) DR tablet 125 mg, 125 mg, Oral, Daily, Anita Farnsworth FNP, 125 mg at 01/05/21 1521  •  divalproex (DEPAKOTE) DR tablet 250 mg, 250 mg, Oral, Daily, Anita Farnsworth FNP, 250 mg at 01/05/21 0911  •  divalproex (DEPAKOTE) DR tablet 500 mg, 500 mg, Oral, Nightly, Anita Farnsworth FNP, 500 mg at 01/04/21 2001  •  enoxaparin (LOVENOX) syringe 60 mg, 60 mg, Subcutaneous, Once, Davide Mills MD  •  [START ON 1/6/2021] enoxaparin (LOVENOX) syringe 90 mg, 1 mg/kg, Subcutaneous, Q24H, Davide Mills MD  •  glucagon (human recombinant) (GLUCAGEN DIAGNOSTIC) injection 1 mg, 1 mg, Subcutaneous, Q15 Min PRN, Anita Farnsworth FNP  •  insulin glargine (LANTUS, SEMGLEE) injection 18 Units, 18 Units, Subcutaneous, Nightly, Anita Farnsworth FNP, 18 Units at 01/04/21 2002  •  insulin lispro (humaLOG, ADMELOG) injection 0-7 Units, 0-7 Units, Subcutaneous, TID AC **AND** insulin lispro (humaLOG, ADMELOG) injection 0-7 Units, 0-7 Units, Subcutaneous, PRN, Anita Farnsworth FNP  •  ipratropium-albuterol (DUO-NEB) nebulizer solution 3 mL, 3 mL, Nebulization, 4x Daily - RT, Davide Mills MD, 3 mL at 01/05/21 2008  •  melatonin tablet 10 mg, 10 mg, Oral, Nightly, Anita Farnsworth FNP, 10 mg at 01/04/21 2001  •  meropenem (MERREM) 500 mg in sodium chloride 0.9 % 100 mL IVPB, 500 mg, Intravenous, Q8H, Davide Mills MD, 500 mg at 01/05/21 1142  •  mirtazapine (REMERON) tablet 7.5 mg, 7.5 mg, Oral, Nightly, Anita Farnsworth FNP, 7.5 mg at 01/04/21 2001  •  ondansetron (ZOFRAN) tablet 4 mg, 4 mg, Oral, Q6H PRN **OR** ondansetron (ZOFRAN) injection 4 mg, 4 mg, Intravenous, Q6H PRN, Anita Farnsworth FNP  •  polyethylene glycol (MIRALAX) packet 17 g, 17 g, Oral, BID, Davide Mills MD, 17 g at 01/05/21 0911  •  QUEtiapine (SEROquel) tablet 50 mg, 50 mg, Oral,  Nightly, Davide Mills MD, 50 mg at 01/04/21 2001  •  sodium chloride 0.9 % flush 10 mL, 10 mL, Intravenous, Q12H, Anita Farnsworth FNP, 10 mL at 01/05/21 0911  •  sodium chloride 0.9 % flush 10 mL, 10 mL, Intravenous, PRN, Anita Farnsworth FNP  •  sodium chloride 0.9 % infusion, 125 mL/hr, Intravenous, Continuous, Davide Mills MD, Last Rate: 125 mL/hr at 01/03/21 1457, 125 mL/hr at 01/03/21 1457  •  tamsulosin (FLOMAX) 24 hr capsule 0.4 mg, 0.4 mg, Oral, Daily, Hermilo Wilson MD, 0.4 mg at 01/05/21 0916    Data Review:  All labs (24hrs):   Recent Results (from the past 24 hour(s))   Renal Function Panel    Collection Time: 01/05/21  3:51 AM    Specimen: Blood   Result Value Ref Range    Glucose 93 65 - 99 mg/dL    BUN 75 (H) 8 - 23 mg/dL    Creatinine 2.96 (H) 0.76 - 1.27 mg/dL    Sodium 132 (L) 136 - 145 mmol/L    Potassium 4.5 3.5 - 5.2 mmol/L    Chloride 101 98 - 107 mmol/L    CO2 21.0 (L) 22.0 - 29.0 mmol/L    Calcium 8.3 (L) 8.6 - 10.5 mg/dL    Albumin 1.90 (L) 3.50 - 5.20 g/dL    Phosphorus 3.8 2.5 - 4.5 mg/dL    Anion Gap 10.0 5.0 - 15.0 mmol/L    BUN/Creatinine Ratio 25.3 (H) 7.0 - 25.0    eGFR Non African Amer 22 (L) >60 mL/min/1.73   Magnesium    Collection Time: 01/05/21  3:51 AM    Specimen: Blood   Result Value Ref Range    Magnesium 2.0 1.6 - 2.4 mg/dL   CBC (No Diff)    Collection Time: 01/05/21  3:51 AM    Specimen: Blood   Result Value Ref Range    WBC 17.30 (H) 3.40 - 10.80 10*3/mm3    RBC 3.06 (L) 4.14 - 5.80 10*6/mm3    Hemoglobin 9.0 (L) 13.0 - 17.7 g/dL    Hematocrit 27.6 (L) 37.5 - 51.0 %    MCV 90.0 79.0 - 97.0 fL    MCH 29.5 26.6 - 33.0 pg    MCHC 32.8 31.5 - 35.7 g/dL    RDW 16.9 (H) 12.3 - 15.4 %    RDW-SD 53.8 37.0 - 54.0 fl    MPV 7.8 6.0 - 12.0 fL    Platelets 115 (L) 140 - 450 10*3/mm3   POC Glucose Once    Collection Time: 01/05/21  7:06 AM    Specimen: Blood   Result Value Ref Range    Glucose 80 70 - 105 mg/dL   POC Glucose Once    Collection Time: 01/05/21 11:21 AM    Specimen: Blood   Result  Value Ref Range    Glucose 95 70 - 105 mg/dL   D-dimer, Quantitative    Collection Time: 01/05/21  2:58 PM    Specimen: Blood   Result Value Ref Range    D-Dimer, Quantitative 10.05 (H) 0.00 - 0.59 mg/L (FEU)   POC Glucose Once    Collection Time: 01/05/21  4:40 PM    Specimen: Blood   Result Value Ref Range    Glucose 98 70 - 105 mg/dL   POC Glucose Once    Collection Time: 01/05/21  7:43 PM    Specimen: Blood   Result Value Ref Range    Glucose 79 70 - 105 mg/dL        Imaging:  CT CHEST WITHOUT CONTRAST DIAGNOSTIC  Narrative:    DATE OF EXAM:  1/5/2021 3:57 PM     PROCEDURE:  CT CHEST WO CONTRAST-     INDICATIONS:   Respiratory illness, nondiagnostic xray; W19.XXXA-Unspecified fall,  initial encounter; R53.1-Weakness; N17.9-Acute kidney failure,  unspecified; N39.0-Urinary tract infection, site not specified;  R31.9-Hematuria, unspecified     COMPARISON:   CT abdomen and pelvis without contrast 01/04/2021     TECHNIQUE:  Routine transaxial slices were obtained through the chest without the  administration of intravenous contrast. Reconstructed coronal and  sagittal images were also obtained. Automated exposure control and  iterative construction methods were used.      FINDINGS:  Visualized soft tissues of the lower neck without acute abnormality. No  pericardial effusion. Coronary calcifications noted. There is a small  right and trace left pleural effusion. The trachea and mainstem bronchi  are patent. Minimal dependent right basilar atelectasis. No focal  consolidation indicate pneumonia. No pneumothorax. Negative for  suspicious pulmonary nodule. No mediastinal, hilar, or axillary  adenopathy within limits of a noncontrast exam     Upper abdomen demonstrates small ascites. Cholelithiasis noted. Mildly  nodular liver contour. There is anasarca again noted. Mild splenomegaly.     Impression: 1. Small right and trace left pleural effusion similar to abdominal CT.  2. Anasarca with small abdominal ascites.  3.  Mild splenomegaly.  4. Cholelithiasis and additional incidental chronic findings above.     Electronically Signed By-Tahir Chase MD On:1/5/2021 4:28 PM  This report was finalized on [id number] by  Tahir Chase MD.       ASSESSMENT:    Acute pulmonary embolism without acute cor pulmonale (CMS/HCC)    Fall    SRAVAN (acute kidney injury) (CMS/[facility])    Stage 3b chronic kidney disease    UTI (urinary tract infection), bacterial    Polypharmacy    E coli bacteremia    Acute respiratory failure with hypoxia (CMS/[facility])         PLAN:  I will get an ABG  We will plan to get a perfusion scan just to be sure there is no underlying pulmonary emboli  And also cardiac echo  CT scan was reviewed  Continue the current antibiotics  Bronchodilator  Inhaled corticosteroids  Electrolytes/ glycemic control  DVT and GI prophylaxis.    Total Critical care time in direct medical management (   ) minutes  Chetna Haines MD. D, ABSM.     1/5/2021  20:25 EST

## 2021-01-06 NOTE — THERAPY TREATMENT NOTE
Patient Name: Ronald Reyer  : 1955    MRN: [id number]                              Today's Date: 2021       Admit Date: 2021    Visit Dx:     ICD-10-CM ICD-9-CM   1. Fall, initial encounter  W19.XXXA E888.9   2. Weakness  R53.1 780.79   3. Acute kidney injury (CMS/HCC)  N17.9 584.9   4. Urinary tract infection with hematuria, site unspecified  N39.0 599.0    R31.9 599.70     Patient Active Problem List   Diagnosis   • Fall   • SRAVAN (acute kidney injury) (CMS/[facility])   • Stage 3b chronic kidney disease   • UTI (urinary tract infection), bacterial   • Polypharmacy   • E coli bacteremia   • Acute respiratory failure with hypoxia (CMS/[facility])   • Acute pulmonary embolism without acute cor pulmonale (CMS/[facility])     Past Medical History:   Diagnosis Date   • Antisocial personality disorder (CMS/[facility])    • Bipolar disorder (CMS/[facility])    • Chronic kidney disease    • Convulsions (CMS/[facility])    • Diabetes mellitus (CMS/[facility])    • Hypertension    • Insomnia    • Renal disorder    • Schizophrenia (CMS/[facility])      History reviewed. No pertinent surgical history.  General Information     Row Name 21 [id number]          Physical Therapy Time and Intention    Document Type  therapy note (daily note)  -EL     Mode of Treatment  individual therapy;physical therapy  -EL       User Key  (r) = Recorded By, (t) = Taken By, (c) = Cosigned By    Initials Name Provider Type    Hardy Morrison PT Physical Therapist        Mobility     Row Name 21 1648          Bed Mobility    Bed Mobility  rolling right;supine-sit;rolling left;sit-supine  -EL     Rolling Left [place] (Bed Mobility)  moderate assist (50% patient effort);2 person assist  -EL     Rolling Right [place] (Bed Mobility)  moderate assist (50% patient effort);2 person assist  -EL     Supine-Sit [place] (Bed Mobility)  moderate assist (50% patient effort);2 person assist;maximum assist (25% patient effort)  -EL     Sit-Supine [place] (Bed Mobility)  moderate  assist (50% patient effort);maximum assist (25% patient effort);2 person assist  -EL     Assistive Device (Bed Mobility)  bed rails;draw sheet  -EL     Comment (Bed Mobility)  Tolerated sitting EOB for approx 8 min this date with CGA and MIN VC for maintaining midline  -EL       User Key  (r) = Recorded By, (t) = Taken By, (c) = Cosigned By    Initials Name Provider Type    Hardy Morrison, PT Physical Therapist        Obj/Interventions    No documentation.       Goals/Plan    No documentation.       Clinical Impression     Row Name 01/06/21 1649          Pain    Additional Documentation  Pain Scale: FACES Pre/Post-Treatment (Group)  -     Row Name 01/06/21 1649          Pain Scale: FACES Pre/Post-Treatment    Pain: FACES Scale, Pretreatment  0-->no hurt  -EL     Posttreatment Pain Rating  0-->no hurt  -EL     Row Name 01/06/21 1649          Plan of Care Review    Plan of Care Reviewed With  patient  -EL     Outcome Summary  Pt pleasant this date and tolerated treatment well. Pt requires 2 person assist to come to sitting EOB, remained sitting for approx 8 min with CGA and VC for maintaince of midline while eating. Once pt was finished and requested to return to supine, required 2 person assist for positioning. In supine pt instucted and assisted with bed moblity and self care. Recommendation remains IP rehab following d/c. PPE worn includes gloves and mask with goggles.  -     Row Name 01/06/21 1649          Therapy Assessment/Plan (PT)    Rehab Potential (PT)  fair, will monitor progress closely  -EL     Criteria for Skilled Interventions Met (PT)  yes  -     Row Name 01/06/21 1649          Vital Signs    O2 Delivery Pre Treatment  room air  -EL     O2 Delivery Intra Treatment  room air  -EL     O2 Delivery Post Treatment  room air  -EL     Pre Patient Position  Supine  -EL     Intra Patient Position  Sitting  -EL     Post Patient Position  Supine  -EL     Row Name 01/06/21 1649          Positioning and  Restraints    Pre-Treatment Position  in bed  -EL     Post Treatment Position  bed  -EL       User Key  (r) = Recorded By, (t) = Taken By, (c) = Cosigned By    Initials Name Provider Type    Hardy Morrison PT Physical Therapist        Outcome Measures     Row Name 01/06/21 1655          How much help from another person do you currently need...    Turning from your back to your side while in flat bed without using bedrails?  2  -EL     Moving from lying on back to sitting on the side of a flat bed without bedrails?  2  -EL     Moving to and from a bed to a chair (including a wheelchair)?  2  -EL     Standing up from a chair using your arms (e.g., wheelchair, bedside chair)?  2  -EL     Climbing 3-5 steps with a railing?  1  -EL     To walk in hospital room?  1  -EL     AM-PAC 6 Clicks Score (PT)  10  -EL     Row Name 01/06/21 1655          Functional Assessment    Outcome Measure Options  AM-PAC 6 Clicks Basic Mobility (PT)  -EL       User Key  (r) = Recorded By, (t) = Taken By, (c) = Cosigned By    Initials Name Provider Type    Hardy Morrison PT Physical Therapist        Physical Therapy Education                 Title: PT OT SLP Therapies (In Progress)     Topic: Physical Therapy (In Progress)     Point: Mobility training (In Progress)     Learning Progress Summary           Patient Acceptance, E,TB, NR by  at 1/6/2021 1655    Acceptance, E,TB, VU by KM at 1/6/2021 0244    Acceptance, E,TB, VU,NR by  at 1/4/2021 2328    Acceptance, E, NR,Bed IU by SC at 1/4/2021 1849    Acceptance, E,TB, VU,NR,DU by  at 1/2/2021 1426                   Point: Home exercise program (Done)     Learning Progress Summary           Patient Acceptance, E,TB, VU by KM at 1/6/2021 0244    Acceptance, E,TB, VU,NR by  at 1/4/2021 2328    Acceptance, E, NR,Bed IU by SC at 1/4/2021 1849    Acceptance, E,TB, VU,NR,DU by  at 1/2/2021 1426                   Point: Body mechanics (Done)     Learning Progress Summary           Patient  Acceptance, E,TB, VU by KM at 1/6/2021 0244    Acceptance, E,TB, VU,NR by  at 1/4/2021 2328    Acceptance, E, NR,Bed IU by SC at 1/4/2021 1849    Acceptance, E,TB, VU,NR,DU by  at 1/2/2021 1426                   Point: Precautions (In Progress)     Learning Progress Summary           Patient Acceptance, E,TB, NR by EL at 1/6/2021 1655    Acceptance, E,TB, VU by  at 1/6/2021 0244    Acceptance, E,TB, VU,NR by  at 1/4/2021 2328    Acceptance, E, NR,Bed IU by SC at 1/4/2021 1849    Acceptance, E,TB, VU,NR,DU by  at 1/2/2021 1426                               User Key     Initials Effective Dates Name Provider Type Discipline    SC 03/01/19 -  Apple Nguyen, PTA Physical Therapy Assistant PT     06/23/20 -  Hardy Cheng, URIEL Physical Therapist PT     04/01/20 -  Lisa Andino, RN Registered Nurse Nurse     10/23/19 -  Kathie Townsend, RN Registered Nurse Nurse     01/07/20 -  Eloisa Nava, URIEL Physical Therapist PT              PT Recommendation and Plan     Plan of Care Reviewed With: patient  Outcome Summary: Pt pleasant this date and tolerated treatment well. Pt requires 2 person assist to come to sitting EOB, remained sitting for approx 8 min with CGA and VC for maintaince of midline while eating. Once pt was finished and requested to return to supine, required 2 person assist for positioning. In supine pt instucted and assisted with bed moblity and self care. Recommendation remains IP rehab following d/c. PPE worn includes gloves and mask with goggles.     Time Calculation:   PT Charges     Row Name 01/06/21 1655             Time Calculation    Start Time  1357  -EL      Stop Time  1420  -EL      Time Calculation (min)  23 min  -EL      PT Received On  01/06/21  -EL      PT - Next Appointment  01/08/21  -EL      PT Goal Re-Cert Due Date  01/20/21  -EL         Time Calculation- PT    Total Timed Code Minutes- PT  23 minute(s)  -EL        User Key  (r) = Recorded By, (t) = Taken By, (c) =  Cosigned By    Initials Name Provider Type    Hardy Morrison, PT Physical Therapist        Therapy Charges for Today     Code Description Service Date Service Provider Modifiers Qty    33413393790 HC PT THERAPEUTIC ACT EA 15 MIN 1/6/2021 Hardy Cheng, PT GP 2          PT G-Codes  Outcome Measure Options: AM-PAC 6 Clicks Basic Mobility (PT)  AM-PAC 6 Clicks Score (PT): 10  Modified Helene Scale: 4 - Moderately severe disability.  Unable to walk without assistance, and unable to attend to own bodily needs without assistance.    Hardy Cheng, URIEL  1/6/2021

## 2021-01-06 NOTE — PLAN OF CARE
Goal Outcome Evaluation:  Plan of Care Reviewed With: patient  Progress: no change  Outcome Summary: Pt pleasant this date and tolerated treatment well. Pt requires 2 person assist to come to sitting EOB, remained sitting for approx 8 min with CGA and VC for maintaince of midline while eating. Once pt was finished and requested to return to supine, required 2 person assist for positioning. In supine pt instucted and assisted with bed moblity and self care. Recommendation remains IP rehab following d/c. PPE worn includes gloves and mask with goggles.

## 2021-01-06 NOTE — PROGRESS NOTES
[facility] Medicine Services  INPATIENT PROGRESS NOTE  2116/1   Hospitalist Team  LOS 5 days      Patient Care Team:  Provider, No Known as PCP - Vincent Yao MD as Consulting Physician (Hospitalist)      Patient was examined with relevant and adequate PPE keeping in mind the current coronavirus pandemic. Minimum of 10 minutes to don and doff PPE.    Chief Complaint / Subjective  Chief Complaint   Patient presents with   • Fall       HPI (4 hpi elements or status of 3 chronic)     65-year-old male presents the ER with a chief complaint of left elbow pain after being found on the floor at the [facility] today.  The patient reports he does not remember falling.  He states he probably just tripped.  The patient told me that he has only been at the [facility] for approximately a week.  However, with review of records it looks that the patient is a long-term [facility] resident.    Patient unable to provide any history drowsy.    No change.  Still nonverbal mostly    Unsure of the baseline.  Patient agitated in the daytime otherwise answers in monosyllables right now 01/04/21, 6:13 PM EST    No significant change. On room air 01/05/21, 2:36 PM EST    Complains of thirst 01/06/21, 6:51 PM EST      Fall          History taken from: chart    Review of Systems   Unable to perform ROS: dementia             Family History   Problem Relation Age of Onset   • No Known Problems Mother    • No Known Problems Father        Past Medical History:   Diagnosis Date   • Antisocial personality disorder (CMS/HCC)    • Bipolar disorder (CMS/HCC)    • Chronic kidney disease    • Convulsions (CMS/HCC)    • Diabetes mellitus (CMS/HCC)    • Hypertension    • Insomnia    • Renal disorder    • Schizophrenia (CMS/HCC)        Social History     Socioeconomic History   • Marital status:      Spouse name: Not on file   • Number of children: Not on file   • Years of education: Not on file   • Highest education level: Not on file      Tobacco Use   • Smoking status: Current Every Day Smoker     Packs/day: 0.50     Types: Cigarettes   • Smokeless tobacco: Never Used   Substance and Sexual Activity   • Alcohol use: Never     Frequency: Never   • Drug use: Never   • Sexual activity: Defer       Prior to Admission medications    Medication Sig Start Date End Date Taking? Authorizing Provider   acetaminophen (TYLENOL) 325 MG tablet Take 650 mg by mouth Every 4 (Four) Hours As Needed for Mild Pain  or Fever.   Yes Jesus Bowser MD   atenolol (TENORMIN) 50 MG tablet Take 50 mg by mouth Daily.   Yes Jesus Bowser MD   divalproex (DEPAKOTE) 125 MG DR tablet Take 125 mg by mouth Daily.   Yes Jesus Bowser MD   divalproex (DEPAKOTE) 250 MG DR tablet Take 250 mg by mouth Daily.   Yes Jesus Bowser MD   divalproex (DEPAKOTE) 500 MG DR tablet Take 500 mg by mouth Every Night.   Yes Jesus Bowser MD   fluPHENAZine (PROLIXIN) 1 MG tablet Take 1 mg by mouth 3 (Three) Times a Day.   Yes Jesus Bowser MD   glucagon (GlucaGen HypoKit) 1 MG injection Infuse 1 mg into a venous catheter 2 (Two) Times a Day As Needed for Low Blood Sugar.   Yes Jesus Bowser MD   ibuprofen (ADVIL,MOTRIN) 800 MG tablet Take 800 mg by mouth Every 8 (Eight) Hours As Needed for Mild Pain .   Yes Jesus Bowser MD   insulin glargine (LANTUS, SEMGLEE) 100 UNIT/ML injection Inject 20 Units under the skin into the appropriate area as directed Every Night.   Yes Jesus Bowser MD   lisinopril (PRINIVIL,ZESTRIL) 5 MG tablet Take 5 mg by mouth Daily.   Yes Jesus Bowser MD   melatonin 5 MG tablet tablet Take 10 mg by mouth Every Night.   Yes Jesus Bowser MD   Menthol, Topical Analgesic, (Biofreeze) 4 % gel Apply  topically 2 (Two) Times a Day As Needed. BL Feet legs back and hips   Yes Jesus Bowser MD   mirtazapine (REMERON) 7.5 MG tablet Take 7.5 mg by mouth Every Night.   Yes Jesus Bowser MD  "  Prenatal Vit-Fe Fumarate-FA (M-Vit) tablet Take 1 tablet by mouth Daily.   Yes Provider, MD Jesus   promethazine (PHENERGAN) 12.5 MG tablet Take 12.5 mg by mouth Every 6 (Six) Hours As Needed for Nausea or Vomiting.   Yes Provider, MD Jesus   QUEtiapine (SEROquel) 100 MG tablet Take 100 mg by mouth Every Night.   Yes Provider, MD Jesus        Objective    Physical Exam     Vital Signs  Temp:  [97.8 °F (36.6 °C)-99.9 °F (37.7 °C)] 97.8 °F (36.6 °C)  Heart Rate:  [67-82] 71  Resp:  [14-20] 18  BP: (153-177)/(66-82) 163/69    Oxygen Therapy  SpO2: 99 %  Pulse Oximetry Type: Continuous  Device (Oxygen Therapy): nasal cannula  Flow (L/min): 2  Flowsheet Rows      First Filed Value   Admission Height  182.9 cm (72\") Documented at 01/01/2021 1711   Admission Weight  90.3 kg (199 lb) Documented at 01/01/2021 1711        Weight change: -0.8 kg (-1 lb 12.2 oz)   Intake & Output (last 3 days)       01/03 0701 - 01/04 0700 01/04 0701 - 01/05 0700 01/05 0701 - 01/06 0700 01/06 0701 - 01/07 0700    P.O. 960 640 480 480    I.V. (mL/kg)   487.8 (5.4)     Total Intake(mL/kg) 960 (10.8) 640 (7) 967.8 (10.7) 480 (5.3)    Urine (mL/kg/hr) 175 (0.1) 125 (0.1)      Stool  0      Total Output 175 125      Net +785 +515 +967.8 +480            Urine Unmeasured Occurrence 4 x 3 x 6 x 4 x    Stool Unmeasured Occurrence  1 x 2 x 1 x        Lines, Drains & Airways    Active LDAs     Name:   Placement date:   Placement time:   Site:   Days:    Peripheral IV 01/02/21 0100 Anterior;Proximal;Right;Upper Arm   01/02/21 0100    Arm   less than 1                Physical Exam:    Physical Exam  Vitals signs and nursing note reviewed.   Constitutional:       General: He is not in acute distress.     Appearance: Normal appearance. He is well-developed. He is not ill-appearing, toxic-appearing or diaphoretic.   HENT:      Head: Normocephalic and atraumatic.      Right Ear: Ear canal and external ear normal.      Left Ear: Ear canal " and external ear normal.      Nose: Nose normal. No congestion or rhinorrhea.      Mouth/Throat:      Mouth: Mucous membranes are dry.      Pharynx: No oropharyngeal exudate.   Eyes:      General: No scleral icterus.        Right eye: No discharge.         Left eye: No discharge.      Extraocular Movements: Extraocular movements intact.      Conjunctiva/sclera: Conjunctivae normal.      Pupils: Pupils are equal, round, and reactive to light.   Neck:      Musculoskeletal: Normal range of motion and neck supple. No neck rigidity or muscular tenderness.      Thyroid: No thyromegaly.      Vascular: No carotid bruit or JVD.      Trachea: No tracheal deviation.   Cardiovascular:      Rate and Rhythm: Normal rate and regular rhythm.      Pulses: Normal pulses.      Heart sounds: Normal heart sounds. No murmur. No friction rub. No gallop.    Pulmonary:      Effort: Pulmonary effort is normal. No respiratory distress.      Breath sounds: No stridor. Wheezing present. No rhonchi or rales.      Comments: Rapid shallow breathing  Chest:      Chest wall: No tenderness.   Abdominal:      General: Bowel sounds are normal. There is no distension.      Palpations: Abdomen is soft. There is no mass.      Tenderness: There is no abdominal tenderness. There is no guarding or rebound.      Hernia: No hernia is present.   Musculoskeletal: Normal range of motion.         General: No swelling, tenderness, deformity or signs of injury.      Right lower leg: No edema.      Left lower leg: No edema.   Lymphadenopathy:      Cervical: No cervical adenopathy.   Skin:     General: Skin is warm and dry.      Coloration: Skin is not jaundiced or pale.      Findings: No bruising, erythema or rash.   Neurological:      General: No focal deficit present.      Mental Status: He is alert. Mental status is at baseline.      Cranial Nerves: No cranial nerve deficit.      Sensory: No sensory deficit.      Motor: No weakness or abnormal muscle tone.       Coordination: Coordination normal.     Reviewed, no change in above data from the prior day.          PT Recommendation and Plan             Procedures:    * No surgery found *     Assessment/Plan with Problem wise       Active Hospital Problems    Diagnosis  POA   • **Acute pulmonary embolism without acute cor pulmonale (CMS/[facility]) [I26.99]  Clinically Undetermined     Priority: High   • E coli bacteremia [R78.81, B96.20]  Yes     Priority: Medium   • Acute respiratory failure with hypoxia (CMS/[facility]) [J96.01]  Yes   • SRAVAN (acute kidney injury) (CMS/[facility]) [N17.9]  Yes   • Stage 3b chronic kidney disease [N18.32]  Yes   • UTI (urinary tract infection), bacterial [N39.0, A49.9]  Yes   • Polypharmacy [Z79.899]  Not Applicable   • Fall [W19.XXXA]  Yes      Resolved Hospital Problems   No resolved problems to display.        Estimated Creatinine Clearance: 36.5 mL/min (A) (by C-G formula based on SCr of 2.63 mg/dL (H)).    Code Status and Medical Interventions:   Ordered at: 01/01/21 2129     Code Status:    CPR     Medical Interventions (Level of Support Prior to Arrest):    Full       MEDICAL DECISION MAKING COMPLEXITY BY PROBLEM:     E. coli bacteremia  Secondary to UTI  Meropenem    UTI:  Cultures  Empiric antibiotics till cultures finalize  Remove Mclain when not needed  Screening urinalysis should not be done and is not recommended by any professional society or organization.  Does not achieve any purpose except antibiotic resistance and adverse effects like C. difficile colitis      Renal failure/insufficiency/injury:  Hydration  Supportive treatment  Cut down or hold ACE inhibitors  Avoid nephrotoxic medications   Address diuretics      Polypharmacy probably contributing to his current condition and constipation.  And impaired intake.    Care coordination with nursing 01/02/21 10:15 AM EST.    Care coordination with nursing and pharmacy regarding  his antibiotics    Stable.  Continue antibiotics for 14 days discharge  planning 1 to 2 days    Appreciate nephrology and ID input.  Need to rule out PE.  Care coordination with nursing for current care 01/05/21, 2:37 PM EST    V/Q ruled out by suboptimal study.  Care coordination with Dr. Elias  Still not in shape for discharge planning.      Plan for disposition:        SLP OP Goals     Row Name 01/06/21 1655          Time Calculation    PT Goal Re-Cert Due Date  01/20/21  -NAA       User Key  (r) = Recorded By, (t) = Taken By, (c) = Cosigned By    Initials Name Provider Type    Hardy Morrison, PT Physical Therapist          Continued Care and Services - Admitted Since 1/1/2021    Coordination has not been started for this encounter.        Historical & Objective Data     Results Review:    I reviewed the patient's new lab and radiology results. 01/06/21     Results from last 7 days   Lab Units 01/06/21 0259 01/05/21 0351 01/04/21 0325 01/01/21  1733   WBC 10*3/mm3 14.40* 17.30* 21.20*   < > 15.30*   HEMOGLOBIN g/dL 9.0* 9.0* 9.6*   < > 10.4*   HEMOGLOBIN, POC   --   --   --    < >  --    HEMATOCRIT % 27.4* 27.6* 29.2*   < > 31.3*   HEMATOCRIT POC   --   --   --    < >  --    MCV fL 88.5 90.0 89.7   < > 89.1   MCH pg 29.2 29.5 29.5   < > 29.6   MPV fL 7.4 7.8 7.9   < > 7.5   RDW % 16.7* 16.9* 16.3*   < > 16.2*   PLATELETS 10*3/mm3 142 115* 115*   < > 162   MONOCYTES % %  --   --   --   --  17.0*    < > = values in this interval not displayed.     Results from last 7 days   Lab Units 01/06/21 0259 01/05/21 0351 01/04/21 0324 01/03/21  1752 01/01/21  1733   SODIUM mmol/L 136 132* 133* 132*  --  133*   POTASSIUM mmol/L 4.7 4.5 5.1 5.4*   < > 4.7   CHLORIDE mmol/L 107 101 103 99   < > 99   CO2 mmol/L 21.0* 21.0* 20.0* 24.0   < > 24.0   BUN mg/dL 72* 75* 69* 66*   < > 35*   CREATININE mg/dL 2.63* 2.96* 3.41* 3.44*   < > 2.24*   CALCIUM mg/dL 8.4* 8.3* 8.7 8.7   < > 8.9   MAGNESIUM mg/dL 2.1 2.0  --  1.9  --   --    BILIRUBIN mg/dL  --   --  0.4 0.3  --  0.4   ALK PHOS U/L  --   --   274* 307*  --  100   ALT (SGPT) U/L  --   --  20 22  --  12   AST (SGOT) U/L  --   --  34 37  --  22   GLUCOSE mg/dL 95 93 79 107*   < > 115*    < > = values in this interval not displayed.     Inflammatory Biomarkers        Invalid input(s): ESR, D-DIMER QUANTITATIVE,  PROCALCITONIN,    Lab Results   Component Value Date    PHOS 4.1 01/06/2021     No results found for: HGBA1C  No results found for: CHOL, CHLPL, TRIG, HDL, LDL, LDLDIRECT  No results found for: LIPASE        Results from last 7 days   Lab Units 01/06/21  0409   PH, ARTERIAL pH units 7.393   PO2 ART mm Hg 63.4*   PCO2, ARTERIAL mm Hg 33.1*   HCO3 ART mmol/L 20.2*     Pathology  No results found for: INTRAOP, PREDX, FINALDX, COMDX  COVID19   Date Value Ref Range Status   01/06/2021 Not Detected Not Detected - Ref. Range Final        Microbiology Results (last 10 days)     Procedure Component Value - Date/Time    COVID-19,CEPHEID,COR/KACI/PAD IN-HOUSE(OR EMERGENT/ADD-ON),NP SWAB IN TRANSPORT MEDIA 3-4 HR TAT - Swab, Nasopharynx [[id number]]  (Normal) Collected: 01/06/21 0624    Lab Status: Final result Specimen: Swab from Nasopharynx Updated: 01/06/21 0712     COVID19 Not Detected    Narrative:      Fact sheet for providers: https://www.fda.gov/media/056758/download     Fact sheet for patients: https://www.fda.gov/media/008290/download    Blood Culture - Blood, Arm, Left [[id number]]  (Abnormal) Collected: 01/01/21 2255    Lab Status: Final result Specimen: Blood from Arm, Left Updated: 01/04/21 1031     Blood Culture Escherichia coli ESBL     Comment: Consider infectious disease consult.  Susceptibility results may not correlate to clinical outcomes.  For ESBL-producing infections in the blood, a carbapenem is recommended as first-line therapy for optimal clinical outcomes.        Isolated from Aerobic and Anaerobic Bottles     Gram Stain Anaerobic Bottle Gram negative bacilli      Aerobic Bottle Gram negative bacilli    Narrative:      Refer to previous  blood culture collected on 1/1/2021 2255 for MICs.    Blood Culture - Blood, Hand, Left [[id number]]  (Abnormal)  (Susceptibility) Collected: 01/01/21 2255    Lab Status: Final result Specimen: Blood from Hand, Left Updated: 01/04/21 1030     Blood Culture Escherichia coli ESBL     Comment: Consider infectious disease consult.  Susceptibility results may not correlate to clinical outcomes.  For ESBL-producing infections in the blood, a carbapenem is recommended as first-line therapy for optimal clinical outcomes.        Isolated from Aerobic and Anaerobic Bottles     Gram Stain Anaerobic Bottle Gram negative bacilli      Aerobic Bottle Gram negative bacilli    Susceptibility      Escherichia coli ESBL     ANA     Ertapenem Susceptible     Meropenem Susceptible                Susceptibility Comments     Escherichia coli ESBL    Cefazolin sensitivity will not be reported for Enterobacteriaceae in non-urine isolates. If cefazolin is preferred, please call the microbiology lab to request an E-test.  With the exception of urinary-sourced infections, aminoglycosides should not be used as monotherapy.             Blood Culture ID, PCR - Blood, Hand, Left [[id number]]  (Abnormal) Collected: 01/01/21 2255    Lab Status: Final result Specimen: Blood from Hand, Left Updated: 01/02/21 1047     BCID, PCR Escherichia coli. Identification by BCID PCR.     BOTTLE TYPE Anaerobic Bottle    COVID-19, ABBOTT IN-HOUSE,NASAL Swab (NO TRANSPORT MEDIA) 2 HR TAT - Swab, Nasopharynx [[id number]]  (Normal) Collected: 01/01/21 2013    Lab Status: Final result Specimen: Swab from Nasopharynx Updated: 01/01/21 2037     COVID19 Presumptive Negative    Narrative:      Fact sheet for providers: https://www.fda.gov/media/487058/download     Fact sheet for patients: https://www.fda.gov/media/175347/download    Test performed by PCR.  If inconsistent with clinical signs and symptoms patient should be tested with different authorized molecular test.     Urine Culture - Urine, Urine, Catheter [[id number]]  (Abnormal)  (Susceptibility) Collected: 01/01/21 1852    Lab Status: Final result Specimen: Urine, Catheter Updated: 01/03/21 0208     Urine Culture >100,000 CFU/mL Escherichia coli ESBL     Comment: Consider infectious disease consult.  Susceptibility results may not correlate to clinical outcomes.       Susceptibility      Escherichia coli ESBL     ANA     Amikacin Susceptible     Ertapenem Susceptible     Gentamicin Resistant     Levofloxacin Resistant     Meropenem Susceptible     Nitrofurantoin Susceptible     Piperacillin + Tazobactam Susceptible     Tetracycline Resistant     Tobramycin Resistant     Trimethoprim + Sulfamethoxazole Resistant                          ECG/EMG Results (most recent)     Procedure Component Value Units Date/Time    ECG 12 Lead [[id number]] Collected: 01/01/21 1728     Updated: 01/01/21 1729     QT Interval 371 ms     Narrative:      HEART RATE= 87  bpm  RR Interval= 692  ms  MT Interval= 120  ms  P Horizontal Axis= 5  deg  P Front Axis= 54  deg  QRSD Interval= 95  ms  QT Interval= 371  ms  QRS Axis= 23  deg  T Wave Axis= 76  deg  - ABNORMAL ECG -  Sinus rhythm  ST elevation, consider anterior injury  Electronically Signed By:   Date and Time of Study: 2021-01-01 17:28:09    Adult Transthoracic Echo Complete W/ Cont if Necessary Per Protocol [[id number]] Collected: 01/06/21 1005     Updated: 01/06/21 1356     BSA 2.1 m^2      RVIDd 2.7 cm      IVSd 1.0 cm      IVSs 1.8 cm      LVIDd 5.5 cm      LVIDs 3.9 cm      LVPWd 1.0 cm      BH CV ECHO RUSSELL - LVPWS 1.7 cm      IVS/LVPW 1.0     FS 28.2 %      EDV(Teich) 146.7 ml      ESV(Teich) 67.7 ml      EF(Teich) 53.9 %      EDV(cubed) 165.4 ml      ESV(cubed) 61.3 ml      EF(cubed) 62.9 %      % IVS thick 73.0 %      % LVPW thick 70.5 %      LV mass(C)d 216.1 grams      LV mass(C)dI 101.5 grams/m^2      LV mass(C)s 289.9 grams      LV mass(C)sI 136.1 grams/m^2      SV(Teich) 79.1 ml       SI(Teich) 37.1 ml/m^2      SV(cubed) 104.1 ml      SI(cubed) 48.8 ml/m^2      Ao root diam 3.2 cm      Ao root area 8.2 cm^2      ACS 2.0 cm      LVOT diam 2.1 cm      LVOT area 3.5 cm^2      EDV(MOD-sp4) 109.0 ml      ESV(MOD-sp4) 38.3 ml      EF(MOD-sp4) 64.8 %      SV(MOD-sp4) 70.7 ml      SI(MOD-sp4) 33.2 ml/m^2      Ao root area (BSA corrected) 1.5     LV Kirby Vol (BSA corrected) 51.2 ml/m^2      LV Sys Vol (BSA corrected) 18.0 ml/m^2      MV E max kasey 96.7 cm/sec      MV A max kasey 83.3 cm/sec      MV E/A 1.2     MV V2 max 101.4 cm/sec      MV max PG 4.1 mmHg      MV V2 mean 61.5 cm/sec      MV mean PG 1.7 mmHg      MV V2 VTI 31.3 cm      MVA(VTI) 2.4 cm^2      MV dec slope 418.7 cm/sec^2      MV dec time 0.23 sec      Ao pk kasey 136.3 cm/sec      Ao max PG 7.4 mmHg      Ao max PG (full) 2.1 mmHg      Ao V2 mean 97.3 cm/sec      Ao mean PG 4.1 mmHg      Ao mean PG (full) 1.2 mmHg      Ao V2 VTI 26.9 cm      CHRISTIANO(I,A) 2.7 cm^2      CHRISTIANO(I,D) 2.7 cm^2      CHRISTIANO(V,A) 2.9 cm^2      CHRISTIANO(V,D) 2.9 cm^2      LV V1 max PG 5.3 mmHg      LV V1 mean PG 2.9 mmHg      LV V1 max 115.1 cm/sec      LV V1 mean 78.8 cm/sec      LV V1 VTI 21.3 cm      SV(Ao) 221.8 ml      SI(Ao) 104.1 ml/m^2      SV(LVOT) 74.0 ml      SI(LVOT) 34.7 ml/m^2      PA V2 max 118.9 cm/sec      PA max PG 5.7 mmHg      PA max PG (full) 1.1 mmHg      PA V2 mean 86.5 cm/sec      PA mean PG 3.2 mmHg      PA mean PG (full) 1.1 mmHg      PA V2 VTI 29.0 cm      PA acc time 0.14 sec      RV V1 max PG 4.5 mmHg      RV V1 mean PG 2.1 mmHg      RV V1 max 106.6 cm/sec      RV V1 mean 66.4 cm/sec      RV V1 VTI 23.9 cm      TR max kasey 326.3 cm/sec      RVSP(TR) 50.6 mmHg      RAP systole 8.0 mmHg      PA pr(Accel) 15.2 mmHg       CV ECHO RUSSELL - BZI_BMI 27.1 kilograms/m^2       [facility] 2.2 m^2       CV ECHO RUSSELL - BZI_METRIC_WEIGHT 90.7 kg       CV ECHO RUSSELL - BZI_METRIC_HEIGHT 182.9 cm      Target HR (85%) 133 bpm      Max. Pred. HR (100%) 157  bpm      EF(MOD-bp) 65.0 %      LA dimension(2D) 4.0 cm           Results for orders placed during the hospital encounter of 01/01/21   Duplex Venous Lower Extremity - Bilateral CAR    Narrative · Normal bilateral lower extremity venous duplex scan.               Ct Head Without Contrast    Result Date: 1/1/2021   1. Motion artifact which limits the study, especially to the skull base. 2. No large parenchymal hemorrhage. 3. There appears to be volume loss secondary to atrophy.  Electronically Signed By-Erick Bautista MD On:1/1/2021 6:13 PM This report was finalized on [id number] by  Erick Bautista MD.    Ct Chest Without Contrast Diagnostic    Result Date: 1/5/2021  1. Small right and trace left pleural effusion similar to abdominal CT. 2. Anasarca with small abdominal ascites. 3. Mild splenomegaly. 4. Cholelithiasis and additional incidental chronic findings above.  Electronically Signed By-Tahir Chase MD On:1/5/2021 4:28 PM This report was finalized on [id number] by  Tahir Chase MD.    Ct Cervical Spine Without Contrast    Result Date: 1/1/2021   1. Limited study due to marked motion artifact at the C4-C6 level. It is difficult to exclude an acute fracture or dislocation in this area. 2. Degenerative spondylosis and facet arthritis. 3. Varying degrees of canal and neural foraminal stenosis which was better appreciated on the previous CT exam where no motion artifact was apparent.  Electronically Signed By-Erick Bautista MD On:1/1/2021 6:34 PM This report was finalized on [id number] by  Erick Bautista MD.    Nm Lung Ventilation Perfusion Aerosol    Result Date: 1/6/2021  Compromised evaluation, although no definite evidence to suggest acute pulmonary embolus.  Electronically Signed By-Javed Wiggins MD On:1/6/2021 9:30 AM This report was finalized on [id number] by  Javed Wiggins MD.    Fl Video Swallow With Speech Single Contrast    Result Date: 1/6/2021  Technically successful modified barium swallow  examination.  Electronically Signed By-Erick Bautista MD On:1/6/2021 2:39 PM This report was finalized on [id number] by  Erick Bautista MD.    Xr Chest 1 View    Result Date: 1/3/2021   1. No acute cardiopulmonary disease.   Electronically Signed By-Gera Young MD On:1/3/2021 6:28 PM This report was finalized on [id number] by  Gera Young MD.    Xr Chest 1 View    Result Date: 1/1/2021  No active disease.  Electronically Signed By-Erick Bautista MD On:1/1/2021 5:59 PM This report was finalized on [id number] by  Erick Bautista MD.    Ct Abdomen Pelvis Stone Protocol    Result Date: 1/4/2021   1. There is a small volume of scattered ascites, mild body wall edema and a small right-sided pleural effusion, which would indicate an abnormal fluid status. Liver margins are lobulated, which could indicate cirrhosis. Correlate for the possibility of liver, kidney or heart failure 2. Bilateral perinephric stranding, which is nonspecific. There is mild thickening of the urinary bladder wall. This could be related to the patient's known urosepsis. 3. Mild osseous degenerative changes.  Electronically Signed By-Juanjo Pastor MD On:1/4/2021 2:27 PM This report was finalized on [id number] by  Juanjo Pastor MD.      I personally reviewed patient's x-ray films and my findings are: 0    I personally reviewed patient's EKG strip and my findings are: 0    Medication Review:   I have reviewed the patient's current medication list 01/06/21     Scheduled Meds  atenolol, 100 mg, Oral, Daily  divalproex, 125 mg, Oral, Daily  divalproex, 250 mg, Oral, Daily  divalproex, 500 mg, Oral, Nightly  enoxaparin, 1 mg/kg, Subcutaneous, Q12H  insulin glargine, 18 Units, Subcutaneous, Nightly  insulin lispro, 0-7 Units, Subcutaneous, TID AC  ipratropium-albuterol, 3 mL, Nebulization, 4x Daily - RT  melatonin, 10 mg, Oral, Nightly  meropenem, 500 mg, Intravenous, Q8H  mirtazapine, 7.5 mg, Oral, Nightly  polyethylene glycol, 17 g, Oral,  BID  QUEtiapine, 50 mg, Oral, Nightly  sodium chloride, 10 mL, Intravenous, Q12H  tamsulosin, 0.4 mg, Oral, Daily        Meds Infusions  sodium chloride, 50 mL/hr, Last Rate: 50 mL/hr (01/06/21 1115)        DVT prophylaxis  Mechanical Order History:     None      Pharmalogical Order History:      Ordered     Dose Route Frequency Stop    01/01/21 2129  enoxaparin (LOVENOX) syringe 40 mg      40 mg SC Daily --                Meds PRN  •  acetaminophen **OR** acetaminophen **OR** acetaminophen  •  dextrose  •  dextrose  •  glucagon (human recombinant)  •  insulin lispro **AND** insulin lispro  •  ondansetron **OR** ondansetron  •  sodium chloride      Davide Mills MD  01/06/21  18:49 EST

## 2021-01-06 NOTE — PLAN OF CARE
Goal Outcome Evaluation:  Plan of Care Reviewed With: patient  Progress: no change  Outcome Summary: Pt frequently yelling out for staff and cursing. Taking off gown and equipment. Slept comfortably throughout night after receiving nightly medications. Will continue to monitor.

## 2021-01-06 NOTE — PROGRESS NOTES
"   LOS: 5 days    Patient Care Team:  Provider, No Known as PCP - General  Vincent Cabezas MD as Consulting Physician (Hospitalist)      Subjective     Patient was short of breath last night but feeling better this morning.  VQ scan negative for PE per report      Objective     Vital Sign Min/Max for last 24 hours  Temp:  [97.8 °F (36.6 °C)-100.4 °F (38 °C)] 97.8 °F (36.6 °C)  Heart Rate:  [67-82] 70  Resp:  [14-20] 18  BP: (153-177)/(66-79) 153/66                       Flowsheet Rows      First Filed Value   Admission Height  182.9 cm (72\") Documented at 01/01/2021 1711   Admission Weight  90.3 kg (199 lb) Documented at 01/01/2021 1711          No intake/output data recorded.  I/O last 3 completed shifts:  In: 967.8 [P.O.:480; I.V.:487.8]  Out: -     Physical Exam:  Physical Exam    General.  Awake, alert  Head.  Atraumatic, normocephalic  Neck.  Supple.  No JVD  ENT.  Oral mucosa dry  Respiratory.  Few scattered wheezes  Cardiovascular.  Regular rhythm  Abdominal.  Obese, soft, nontender  Extremities.  No edema     LABS:  Lab Results   Component Value Date    CALCIUM 8.4 (L) 01/06/2021    PHOS 4.1 01/06/2021     Results from last 7 days   Lab Units 01/06/21  0259 01/05/21  0351 01/04/21  0325 01/04/21  0324 01/03/21  1752  01/01/21  1733   MAGNESIUM mg/dL 2.1 2.0  --   --  1.9  --   --    SODIUM mmol/L 136 132*  --  133* 132*  --  133*   POTASSIUM mmol/L 4.7 4.5  --  5.1 5.4*   < > 4.7   CHLORIDE mmol/L 107 101  --  103 99   < > 99   CO2 mmol/L 21.0* 21.0*  --  20.0* 24.0   < > 24.0   BUN mg/dL 72* 75*  --  69* 66*   < > 35*   CREATININE mg/dL 2.63* 2.96*  --  3.41* 3.44*   < > 2.24*   GLUCOSE mg/dL 95 93  --  79 107*   < > 115*   CALCIUM mg/dL 8.4* 8.3*  --  8.7 8.7   < > 8.9   WBC 10*3/mm3 14.40* 17.30* 21.20*  --  18.50*   < > 15.30*   HEMOGLOBIN g/dL 9.0* 9.0* 9.6*  --  10.7*   < > 10.4*   HEMOGLOBIN, POC   --   --   --   --   --    < >  --    PLATELETS 10*3/mm3 142 115* 115*  --  126*   < > 162   ALT (SGPT) " U/L  --   --   --  20 22  --  12   AST (SGOT) U/L  --   --   --  34 37  --  22    < > = values in this interval not displayed.     Lab Results   Component Value Date    CKTOTAL 92 01/01/2021    TROPONINT <0.010 01/04/2021     Estimated Creatinine Clearance: 36.7 mL/min (A) (by C-G formula based on SCr of 2.63 mg/dL (H)).  Results from last 7 days   Lab Units 01/06/21  0409   PH, ARTERIAL pH units 7.393   PO2 ART mm Hg 63.4*   PCO2, ARTERIAL mm Hg 33.1*   HCO3 ART mmol/L 20.2*     Brief Urine Lab Results  (Last result in the past 365 days)      Color   Clarity   Blood   Leuk Est   Nitrite   Protein   CREAT   Urine HCG        01/01/21 1852 Yellow Turbid  Comment:  Result checked  Large (3+) Moderate (2+) Positive >=300 mg/dL (3+)             WEIGHTS:     Wt Readings from Last 1 Encounters:   01/06/21 0600 90.2 kg (198 lb 13.7 oz)   01/05/21 0500 91 kg (200 lb 9.9 oz)   01/04/21 0500 88.8 kg (195 lb 12.3 oz)   01/03/21 2346 88.8 kg (195 lb 12.3 oz)   01/01/21 2230 86.1 kg (189 lb 13.1 oz)   01/01/21 1711 90.3 kg (199 lb)       atenolol, 100 mg, Oral, Daily  divalproex, 125 mg, Oral, Daily  divalproex, 250 mg, Oral, Daily  divalproex, 500 mg, Oral, Nightly  enoxaparin, 1 mg/kg, Subcutaneous, Q24H  insulin glargine, 18 Units, Subcutaneous, Nightly  insulin lispro, 0-7 Units, Subcutaneous, TID AC  ipratropium-albuterol, 3 mL, Nebulization, 4x Daily - RT  melatonin, 10 mg, Oral, Nightly  meropenem, 500 mg, Intravenous, Q8H  mirtazapine, 7.5 mg, Oral, Nightly  polyethylene glycol, 17 g, Oral, BID  QUEtiapine, 50 mg, Oral, Nightly  sodium chloride, 10 mL, Intravenous, Q12H  tamsulosin, 0.4 mg, Oral, Daily      sodium chloride, 125 mL/hr, Last Rate: 125 mL/hr (01/03/21 4887)        Assessment/Plan       1.  Acute kidney injury  Nonoliguric.  Renal function improving slowly  Decrease IV fluids.  Electrolytes acceptable  Follow echo report    2.  Hyperkalemia  Improved    3.  E. coli UTI with bacteremia  On IV  meropenem  Possibly related to urinary retention.  Mild bladder wall thickening noticed on CT scan but no hydronephrosis  On Flomax.  Will order PVR    4.  Hyponatremia  Improving with IV hydration    Hermilo Wilson MD  01/06/21  10:22 EST

## 2021-01-06 NOTE — THERAPY EVALUATION
Acute Care - Speech Language Pathology   Swallow Initial Evaluation  [facility]     Patient Name: Ronald Reyer  : 1955  MRN: [id number]  Today's Date: 2021               Admit Date: 2021    Visit Dx:     ICD-10-CM ICD-9-CM   1. Fall, initial encounter  W19.XXXA E888.9   2. Weakness  R53.1 780.79   3. Acute kidney injury (CMS/HCC)  N17.9 584.9   4. Urinary tract infection with hematuria, site unspecified  N39.0 599.0    R31.9 599.70     Patient Active Problem List   Diagnosis   • Fall   • SRAVAN (acute kidney injury) (CMS/HCC)   • Stage 3b chronic kidney disease   • UTI (urinary tract infection), bacterial   • Polypharmacy   • E coli bacteremia   • Acute respiratory failure with hypoxia (CMS/HCC)   • Acute pulmonary embolism without acute cor pulmonale (CMS/HCC)     Past Medical History:   Diagnosis Date   • Antisocial personality disorder (CMS/HCC)    • Bipolar disorder (CMS/HCC)    • Chronic kidney disease    • Convulsions (CMS/HCC)    • Diabetes mellitus (CMS/HCC)    • Hypertension    • Insomnia    • Renal disorder    • Schizophrenia (CMS/HCC)      History reviewed. No pertinent surgical history.     SWALLOW EVALUATION (last 72 hours)      SLP Adult Swallow Evaluation     Row Name 21 1600       Rehab Evaluation    Document Type  evaluation;re-evaluation  -EC    Subjective Information  no complaints  -EC    Patient Observations  alert;cooperative;agree to therapy  -EC    Patient/Family/Caregiver Comments/Observations  --    Care Plan Review  evaluation/treatment results reviewed;care plan/treatment goals reviewed;patient/other agree to care plan  -EC    Patient Effort  good  -EC    Comment  Pt seen for re-eval of swallow at bedside on this date w/VFSS performed after re-eval due to s/s of aspiration at bedside w/thin water including wet vocal quality and coughing.  See results of VFSS below.  -EC       General Information    Patient Profile Reviewed  yes  -EC    Current Method of Nutrition  --  mechanical ground  -EC    Prior Level of Function-Swallowing  --    Plans/Goals Discussed with  patient  -EC                      MBS/VFSS    Utensils Used  spoon;straw  -EC    Consistencies Trialed  mixed consistency;pureed;thin liquids;nectar/syrup-thick liquids;honey-thick liquids mechanical soft  -EC       MBS/VFSS Interpretation    VFSS Summary  VFSS completed on this date w/trials of thin liquid, NTL, HTL, puree and mechanical soft solids.  Based on results of VFSS, recommend a mechanical soft diet w/NTL, pt should have NTL by spoon only at this time as he is at risk of aspiration w/large cup sips, pt should be sitting upright to 90 degrees for all PO and take small bites of mechanical soft.  ST to continue to follow and will repeat VFSS as indicated.  Detailed results as follows:  Pt demonstrates anterior spillage of all trials during VFSS.  THIN: Pt takes very large sip. Premature spillage to the valleculae and posterior side of epiglottis w/gerda aspiration during the swallow and continued deep penetration/aspiration of thin on subsequent swallows to clear bolus/residue.  Pt noted w/delayed cough response following aspiration and cough does not clear material.  NTL: Pt given 3 trials by spoon w/spillage ot the valleculae noted and penetration during 1/3 trials.  Mild to moderate vallecular and pyriform residue noted.  HTL Pt administered trials 4 trials by spoon w/spillage to the valleculae noted. No penetration or aspiration w/this consistency. Mild vallecular residue and pyriform sinus residue after the swallow.  PUREE: Disorganized oral transit w/bolus holding and tongue pumping noted.  No penetration or aspiration noted.  Oral residue, mild to moderate vallecular and pyriform residue noted after the swallow.  MECHANICAL SOFT: Spillage to the vlaleuclae noted.  No penetration or aspiration demonstrated.  Moderate pyriform sinus residue noted.    -EC       Clinical Impression    SLP Diet Recommendation   ground;nectar thick liquids  -EC    Recommended Diagnostics  reassess via VFSS ([facility] [place]  -EC    Recommended Precautions and Strategies  upright posture during/after eating;small bites of food and sips of liquid;1:1 supervision;reflux precautions;general aspiration precautions;no straw NTL by spoon  -EC    Oral Care Recommendations  Oral Care before breakfast, after meals and PRN  -EC    SLP Rec. for Method of Medication Administration  with pudding or applesauce;as tolerated  -EC    Monitor for Signs of Aspiration  yes;notify SLP if any concerns;cough  -EC       Swallow Goals (SLP)    Oral Nutrition/Hydration Goal Selection (SLP)  oral nutrition/hydration, SLP goal 1;oral nutrition/hydration, SLP goal 2  -EC       Oral Nutrition/Hydration Goal 1 (SLP)    Oral Nutrition/Hydration Goal 1, SLP  Pt will tolerate recommended diet with no s/s aspiration   -EC    Time Frame (Oral Nutrition/Hydration Goal 1, SLP)  by discharge  -EC    Barriers (Oral Nutrition/Hydration Goal 1, SLP)  Pt participated in VFSS today, see above note  -EC    Progress/Outcomes (Oral Nutrition/Hydration Goal 1, SLP)  goal ongoing  -EC       Oral Nutrition/Hydration Goal 2 (SLP)    Oral Nutrition/Hydration Goal 2, SLP  Pt will verbalize/demonstrate understanding of safe swallow strategies & techniques with max cues as required.   -EC    Time Frame (Oral Nutrition/Hydration Goal 2, SLP)  2 days;3 days  -EC    Barriers (Oral Nutrition/Hydration Goal 2, SLP)  --    Progress/Outcomes (Oral Nutrition/Hydration Goal 2, SLP)  goal ongoing  -EC      User Key  (r) = Recorded By, (t) = Taken By, (c) = Cosigned By    Initials Name Effective Dates    Violet Vega 03/01/19 -     Sallie Mcclain SLP 03/01/19 -     Concha Grullon SLP 06/17/19 -       PPE: surgical mask, eye protection, gloves    EDUCATION  The patient has been educated in the following areas:   Dysphagia (Swallowing Impairment) Modified Diet Instruction.    SLP Recommendation and  Plan     SLP Diet Recommendation: ground, nectar thick liquids  Recommended Precautions and Strategies: upright posture during/after eating, small bites of food and sips of liquid, 1:1 supervision, reflux precautions, general aspiration precautions, no straw(NTL by spoon)  SLP Rec. for Method of Medication Administration: with pudding or applesauce, as tolerated     Monitor for Signs of Aspiration: yes, notify SLP if any concerns, cough  Recommended Diagnostics: reassess via VFSS ([facility] [place])                                             SLP GOALS     Row Name 01/06/21 1600 01/05/21 1500 01/04/21 1100       Oral Nutrition/Hydration Goal 1 (SLP)    Oral Nutrition/Hydration Goal 1, SLP  Pt will tolerate recommended diet with no s/s aspiration   -EC  Pt will tolerate recommended diet with no s/s aspiration   -MF  Pt will tolerate recommended diet with no s/s aspiration   -MC    Time Frame (Oral Nutrition/Hydration Goal 1, SLP)  by discharge  -EC  by discharge  -MF  by discharge  -MC    Barriers (Oral Nutrition/Hydration Goal 1, SLP)  Pt participated in VFSS today, see above note  -EC  Pt participated in limited meal assessment this date due to need to urinate. Pt largely appeared to tolerate mech soft and thins based on today's session; however pt with x1 instance of coughing with large drink of liquids. Pt may benefit from VFSS tomorrow as appropriate.   -MF  --    Progress/Outcomes (Oral Nutrition/Hydration Goal 1, SLP)  goal ongoing  -EC  goal ongoing  -MF  --       Oral Nutrition/Hydration Goal 2 (SLP)    Oral Nutrition/Hydration Goal 2, SLP  Pt will verbalize/demonstrate understanding of safe swallow strategies & techniques with max cues as required.   -EC  Pt will verbalize/demonstrate understanding of safe swallow strategies & techniques with max cues as required.   -MF  Pt will verbalize/demonstrate understanding of safe swallow strategies & techniques with max cues as required.   -MC    Time Frame (Oral  Nutrition/Hydration Goal 2, SLP)  2 days;3 days  -EC  2 days;3 days  -MF  2 days;3 days  -    Barriers (Oral Nutrition/Hydration Goal 2, SLP)  --  Pt seen for meal assessment at lunch time meal. Pt did not attempt to feed himself, so SLP provided all trials. Pt accepted trials of thins via straw, puree, and mech soft. Noted slightly slow mastication of mech soft solids, however clear his oral cavity independently. No residue observed. Pt coughed following 1/5 trials of thin liquids due to pt impulsively taking large consecutive drinks via straw. No further overt s/s of aspiration observed at any time. However, pt noted to have shallow, dyspneic breathing throughout session. Pt to have STAT CT chest this date. Will continue to follow.   -MF  --    Progress/Outcomes (Oral Nutrition/Hydration Goal 2, SLP)  goal ongoing  -EC  goal ongoing  -MF  --      User Key  (r) = Recorded By, (t) = Taken By, (c) = Cosigned By    Initials Name Provider Type    Violet Vega Speech and Language Pathologist    Sallie Mcclain, SLP Speech and Language Pathologist    Concha Grullon, JOVAN Speech and Language Pathologist             Time Calculation:                Violet Duong  1/6/2021

## 2021-01-06 NOTE — PLAN OF CARE
Goal Outcome Evaluation:  Plan of Care Reviewed With: patient  Progress: no change       VFSS completed on this date w/trials of thin liquid, NTL, HTL, puree and mechanical soft solids.  Based on results of VFSS, recommend a mechanical soft diet w/NTL, pt should have NTL by spoon only at this time as he is at risk of aspiration w/large cup/straw sips, pt should be sitting upright to 90 degrees for all PO and take small bites of mechanical soft.  ST to continue to follow and will repeat VFSS as indicated.  Detailed results as follows:  Pt demonstrates anterior spillage of all trials during VFSS.  THIN: Pt takes very large sip. Premature spillage to the valleculae and posterior side of epiglottis w/gerda aspiration during the swallow and continued deep penetration/aspiration of thin on subsequent swallows to clear bolus/residue.  Pt noted w/delayed cough response following aspiration and cough does not clear material.  NTL: Pt given 3 trials by spoon w/spillage ot the valleculae noted and penetration during 1/3 trials.  Mild to moderate vallecular and pyriform residue noted.  HTL Pt administered trials 4 trials by spoon w/spillage to the valleculae noted. No penetration or aspiration w/this consistency. Mild vallecular residue and pyriform sinus residue after the swallow.  PUREE: Disorganized oral transit w/bolus holding and tongue pumping noted.  No penetration or aspiration noted.  Oral residue, mild to moderate vallecular and pyriform residue noted after the swallow.  MECHANICAL SOFT: Spillage to the vlaleuclae noted.  No penetration or aspiration demonstrated.  Moderate pyriform sinus residue noted.

## 2021-01-07 LAB
ALBUMIN SERPL-MCNC: 1.6 G/DL (ref 3.5–5.2)
ANION GAP SERPL CALCULATED.3IONS-SCNC: 8 MMOL/L (ref 5–15)
BUN SERPL-MCNC: 72 MG/DL (ref 8–23)
BUN/CREAT SERPL: 29.3 (ref 7–25)
CALCIUM SPEC-SCNC: 8.2 MG/DL (ref 8.6–10.5)
CHLORIDE SERPL-SCNC: 111 MMOL/L (ref 98–107)
CO2 SERPL-SCNC: 21 MMOL/L (ref 22–29)
CREAT SERPL-MCNC: 2.46 MG/DL (ref 0.76–1.27)
DEPRECATED RDW RBC AUTO: 55.1 FL (ref 37–54)
ERYTHROCYTE [DISTWIDTH] IN BLOOD BY AUTOMATED COUNT: 17.4 % (ref 12.3–15.4)
GFR SERPL CREATININE-BSD FRML MDRD: 27 ML/MIN/1.73
GLUCOSE BLDC GLUCOMTR-MCNC: 130 MG/DL (ref 70–105)
GLUCOSE BLDC GLUCOMTR-MCNC: 151 MG/DL (ref 70–105)
GLUCOSE BLDC GLUCOMTR-MCNC: 208 MG/DL (ref 70–105)
GLUCOSE BLDC GLUCOMTR-MCNC: 80 MG/DL (ref 70–105)
GLUCOSE SERPL-MCNC: 110 MG/DL (ref 65–99)
HCT VFR BLD AUTO: 25.9 % (ref 37.5–51)
HGB BLD-MCNC: 8.5 G/DL (ref 13–17.7)
MCH RBC QN AUTO: 29.5 PG (ref 26.6–33)
MCHC RBC AUTO-ENTMCNC: 32.9 G/DL (ref 31.5–35.7)
MCV RBC AUTO: 89.8 FL (ref 79–97)
PHOSPHATE SERPL-MCNC: 3.8 MG/DL (ref 2.5–4.5)
PLATELET # BLD AUTO: 142 10*3/MM3 (ref 140–450)
PMV BLD AUTO: 7.4 FL (ref 6–12)
POTASSIUM SERPL-SCNC: 5 MMOL/L (ref 3.5–5.2)
RBC # BLD AUTO: 2.88 10*6/MM3 (ref 4.14–5.8)
SODIUM SERPL-SCNC: 140 MMOL/L (ref 136–145)
WBC # BLD AUTO: 10 10*3/MM3 (ref 3.4–10.8)

## 2021-01-07 PROCEDURE — 94799 UNLISTED PULMONARY SVC/PX: CPT

## 2021-01-07 PROCEDURE — 82962 GLUCOSE BLOOD TEST: CPT

## 2021-01-07 PROCEDURE — 25010000002 MEROPENEM PER 100 MG: Performed by: INTERNAL MEDICINE

## 2021-01-07 PROCEDURE — 25010000002 LORAZEPAM PER 2 MG: Performed by: INTERNAL MEDICINE

## 2021-01-07 PROCEDURE — 63710000001 INSULIN LISPRO (HUMAN) PER 5 UNITS: Performed by: NURSE PRACTITIONER

## 2021-01-07 PROCEDURE — 94660 CPAP INITIATION&MGMT: CPT

## 2021-01-07 PROCEDURE — 63710000001 INSULIN GLARGINE PER 5 UNITS: Performed by: NURSE PRACTITIONER

## 2021-01-07 PROCEDURE — 85027 COMPLETE CBC AUTOMATED: CPT | Performed by: INTERNAL MEDICINE

## 2021-01-07 PROCEDURE — 92526 ORAL FUNCTION THERAPY: CPT

## 2021-01-07 PROCEDURE — 97530 THERAPEUTIC ACTIVITIES: CPT

## 2021-01-07 PROCEDURE — 25010000002 ENOXAPARIN PER 10 MG: Performed by: INTERNAL MEDICINE

## 2021-01-07 PROCEDURE — 80069 RENAL FUNCTION PANEL: CPT | Performed by: INTERNAL MEDICINE

## 2021-01-07 PROCEDURE — 99233 SBSQ HOSP IP/OBS HIGH 50: CPT | Performed by: INTERNAL MEDICINE

## 2021-01-07 RX ORDER — LORAZEPAM 2 MG/ML
0.5 INJECTION INTRAMUSCULAR EVERY 4 HOURS PRN
Status: DISCONTINUED | OUTPATIENT
Start: 2021-01-07 | End: 2021-01-09 | Stop reason: HOSPADM

## 2021-01-07 RX ORDER — HALOPERIDOL 5 MG/ML
1 INJECTION INTRAMUSCULAR EVERY 6 HOURS PRN
Status: DISCONTINUED | OUTPATIENT
Start: 2021-01-07 | End: 2021-01-09 | Stop reason: HOSPADM

## 2021-01-07 RX ORDER — AMLODIPINE BESYLATE 5 MG/1
2.5 TABLET ORAL
Status: DISCONTINUED | OUTPATIENT
Start: 2021-01-07 | End: 2021-01-08

## 2021-01-07 RX ADMIN — POLYETHYLENE GLYCOL 3350 17 G: 17 POWDER, FOR SOLUTION ORAL at 21:59

## 2021-01-07 RX ADMIN — QUETIAPINE FUMARATE 50 MG: 25 TABLET ORAL at 21:59

## 2021-01-07 RX ADMIN — POLYETHYLENE GLYCOL 3350 17 G: 17 POWDER, FOR SOLUTION ORAL at 09:30

## 2021-01-07 RX ADMIN — DIVALPROEX SODIUM 250 MG: 250 TABLET, DELAYED RELEASE ORAL at 09:30

## 2021-01-07 RX ADMIN — MEROPENEM 500 MG: 500 INJECTION, POWDER, FOR SOLUTION INTRAVENOUS at 03:04

## 2021-01-07 RX ADMIN — MIRTAZAPINE 7.5 MG: 7.5 TABLET ORAL at 21:59

## 2021-01-07 RX ADMIN — MEROPENEM 500 MG: 500 INJECTION, POWDER, FOR SOLUTION INTRAVENOUS at 13:13

## 2021-01-07 RX ADMIN — Medication 10 MG: at 22:03

## 2021-01-07 RX ADMIN — INSULIN GLARGINE 18 UNITS: 100 INJECTION, SOLUTION SUBCUTANEOUS at 22:00

## 2021-01-07 RX ADMIN — IPRATROPIUM BROMIDE AND ALBUTEROL SULFATE 3 ML: 2.5; .5 SOLUTION RESPIRATORY (INHALATION) at 10:42

## 2021-01-07 RX ADMIN — SODIUM CHLORIDE 50 ML/HR: 900 INJECTION INTRAVENOUS at 13:12

## 2021-01-07 RX ADMIN — ENOXAPARIN SODIUM 90 MG: 100 INJECTION SUBCUTANEOUS at 18:11

## 2021-01-07 RX ADMIN — ENOXAPARIN SODIUM 90 MG: 100 INJECTION SUBCUTANEOUS at 03:04

## 2021-01-07 RX ADMIN — INSULIN LISPRO 2 UNITS: 100 INJECTION, SOLUTION INTRAVENOUS; SUBCUTANEOUS at 18:11

## 2021-01-07 RX ADMIN — IPRATROPIUM BROMIDE AND ALBUTEROL SULFATE 3 ML: 2.5; .5 SOLUTION RESPIRATORY (INHALATION) at 06:21

## 2021-01-07 RX ADMIN — Medication 10 ML: at 22:00

## 2021-01-07 RX ADMIN — LORAZEPAM 0.5 MG: 2 INJECTION INTRAMUSCULAR; INTRAVENOUS at 13:14

## 2021-01-07 RX ADMIN — DIVALPROEX SODIUM 500 MG: 500 TABLET, DELAYED RELEASE ORAL at 21:59

## 2021-01-07 RX ADMIN — AMLODIPINE BESYLATE 2.5 MG: 5 TABLET ORAL at 13:11

## 2021-01-07 RX ADMIN — IPRATROPIUM BROMIDE AND ALBUTEROL SULFATE 3 ML: 2.5; .5 SOLUTION RESPIRATORY (INHALATION) at 20:29

## 2021-01-07 RX ADMIN — MEROPENEM 500 MG: 500 INJECTION, POWDER, FOR SOLUTION INTRAVENOUS at 22:58

## 2021-01-07 RX ADMIN — DIVALPROEX SODIUM 125 MG: 125 TABLET, DELAYED RELEASE ORAL at 13:11

## 2021-01-07 RX ADMIN — ATENOLOL 100 MG: 50 TABLET ORAL at 09:30

## 2021-01-07 RX ADMIN — TAMSULOSIN HYDROCHLORIDE 0.4 MG: 0.4 CAPSULE ORAL at 09:30

## 2021-01-07 RX ADMIN — Medication 10 ML: at 09:30

## 2021-01-07 RX ADMIN — IPRATROPIUM BROMIDE AND ALBUTEROL SULFATE 3 ML: 2.5; .5 SOLUTION RESPIRATORY (INHALATION) at 16:23

## 2021-01-07 NOTE — PROGRESS NOTES
"PULMONARY CRITICAL CARE Progress  NOTE      PATIENT IDENTIFICATION:  Name: Reyer, Ronald  MRN: [id number]  :  1955     Age: 65 y.o.  Sex: male    DATE OF Note:  2021   Referring Physician: Davide Mills MD                  Subjective:   Feeling better but still requiring higher FIO2   No nausea or vomiting, no change in bowel habit, no dysuria,  no new  skin rash or itching.      Objective:  tMax 24 hrs: Temp (24hrs), Av.7 °F (37.1 °C), Min:97.8 °F (36.6 °C), Max:99.9 °F (37.7 °C)      Vitals Ranges:   Temp:  [97.8 °F (36.6 °C)-99.9 °F (37.7 °C)] 99.9 °F (37.7 °C)  Heart Rate:  [67-79] 79  Resp:  [14-19] 14  BP: (153-164)/(66-82) 163/69    Intake and Output Last 3 Shifts:   I/O last 3 completed shifts:  In: 1447.8 [P.O.:960; I.V.:487.8]  Out: -     Exam:  /69   Pulse 79   Temp 99.9 °F (37.7 °C) (Axillary)   Resp 14   Ht 182.9 cm (72\")   Wt 89.8 kg (198 lb)   SpO2 100%   BMI 26.85 kg/m²     General Appearance:   Looks fatigue tachypneic  HEENT:  Normocephalic, without obvious abnormality, Conjunctiva/corneas clear,.  Normal external ear canals, Nares normal, no drainage     Neck:  Supple, symmetrical, trachea midline. No JVD.  Lungs /Chest wall:   Bilateral basal rhonchi, respirations unlabored symmetrical wall movement.     Heart:  Regular rate and rhythm, systolic murmur PMI left sternal border  Abdomen: Soft, non-tender, no masses, no organomegaly.    Extremities: Trace edema no clubbing or Cyanosis        Medications:    Current Facility-Administered Medications:   •  acetaminophen (TYLENOL) tablet 650 mg, 650 mg, Oral, Q4H PRN, 650 mg at 21 0953 **OR** acetaminophen (TYLENOL) 160 MG/5ML solution 650 mg, 650 mg, Oral, Q4H PRN **OR** acetaminophen (TYLENOL) suppository 650 mg, 650 mg, Rectal, Q4H PRN, Anita Farnsworth FNP, 650 mg at 21 1905  •  atenolol (TENORMIN) tablet 100 mg, 100 mg, Oral, Daily, Davide Mills MD, 100 mg at 21 1115  •  dextrose (D50W) 25 g/ 50mL " Intravenous Solution 25 g, 25 g, Intravenous, Q15 Min PRN, Anita Farnsworth FNP  •  dextrose (GLUTOSE) oral gel 15 g, 15 g, Oral, Q15 Min PRN, Anita Farnsworth FNP  •  divalproex (DEPAKOTE) DR tablet 125 mg, 125 mg, Oral, Daily, Anita Farnsworth FNP, 125 mg at 01/06/21 1630  •  divalproex (DEPAKOTE) DR tablet 250 mg, 250 mg, Oral, Daily, Anita Farnsworth FNP, 250 mg at 01/06/21 1114  •  divalproex (DEPAKOTE) DR tablet 500 mg, 500 mg, Oral, Nightly, Anita Farnsworth FNP, 500 mg at 01/06/21 2032  •  enoxaparin (LOVENOX) syringe 90 mg, 1 mg/kg, Subcutaneous, Q12H, Davide Mills MD, 90 mg at 01/06/21 1631  •  glucagon (human recombinant) (GLUCAGEN DIAGNOSTIC) injection 1 mg, 1 mg, Subcutaneous, Q15 Min PRN, Anita Farnsworth FNP  •  insulin glargine (LANTUS, SEMGLEE) injection 18 Units, 18 Units, Subcutaneous, Nightly, Anita Farnsworth FNP, 18 Units at 01/05/21 2241  •  insulin lispro (humaLOG, ADMELOG) injection 0-7 Units, 0-7 Units, Subcutaneous, TID AC **AND** insulin lispro (humaLOG, ADMELOG) injection 0-7 Units, 0-7 Units, Subcutaneous, PRN, Anita Farnsworth FNP  •  ipratropium-albuterol (DUO-NEB) nebulizer solution 3 mL, 3 mL, Nebulization, 4x Daily - RT, Davide Mills MD, 3 mL at 01/06/21 2000  •  melatonin tablet 10 mg, 10 mg, Oral, Nightly, Anita Farnsworth FNP, 10 mg at 01/06/21 2032  •  meropenem (MERREM) 500 mg in sodium chloride 0.9 % 100 mL IVPB, 500 mg, Intravenous, Q8H, Davide Mills MD, 500 mg at 01/06/21 2031  •  mirtazapine (REMERON) tablet 7.5 mg, 7.5 mg, Oral, Nightly, Anita Farnsworth FNP, 7.5 mg at 01/06/21 2031  •  ondansetron (ZOFRAN) tablet 4 mg, 4 mg, Oral, Q6H PRN **OR** ondansetron (ZOFRAN) injection 4 mg, 4 mg, Intravenous, Q6H PRN, Anita Farnsworth FNP  •  polyethylene glycol (MIRALAX) packet 17 g, 17 g, Oral, BID, Davide Mills MD, 17 g at 01/06/21 2129  •  QUEtiapine (SEROquel) tablet 50 mg, 50 mg, Oral, Nightly, Davide Mills MD, 50 mg at 01/06/21 2032  •  sodium chloride 0.9 % flush 10 mL, 10 mL, Intravenous, Q12H, Anita Farnsworth FNP, 10 mL  at 01/06/21 2032  •  sodium chloride 0.9 % flush 10 mL, 10 mL, Intravenous, PRN, Anita Farnsworth FNP  •  sodium chloride 0.9 % infusion, 50 mL/hr, Intravenous, Continuous, Hermilo Wilson MD, Last Rate: 50 mL/hr at 01/06/21 1914, 50 mL/hr at 01/06/21 1914  •  tamsulosin (FLOMAX) 24 hr capsule 0.4 mg, 0.4 mg, Oral, Daily, Hermilo Wilson MD, 0.4 mg at 01/06/21 1114    Data Review:  All labs (24hrs):   Recent Results (from the past 24 hour(s))   Renal Function Panel    Collection Time: 01/06/21  2:59 AM    Specimen: Blood   Result Value Ref Range    Glucose 95 65 - 99 mg/dL    BUN 72 (H) 8 - 23 mg/dL    Creatinine 2.63 (H) 0.76 - 1.27 mg/dL    Sodium 136 136 - 145 mmol/L    Potassium 4.7 3.5 - 5.2 mmol/L    Chloride 107 98 - 107 mmol/L    CO2 21.0 (L) 22.0 - 29.0 mmol/L    Calcium 8.4 (L) 8.6 - 10.5 mg/dL    Albumin 2.00 (L) 3.50 - 5.20 g/dL    Phosphorus 4.1 2.5 - 4.5 mg/dL    Anion Gap 8.0 5.0 - 15.0 mmol/L    BUN/Creatinine Ratio 27.4 (H) 7.0 - 25.0    eGFR Non African Amer 25 (L) >60 mL/min/1.73   Magnesium    Collection Time: 01/06/21  2:59 AM    Specimen: Blood   Result Value Ref Range    Magnesium 2.1 1.6 - 2.4 mg/dL   CBC (No Diff)    Collection Time: 01/06/21  2:59 AM    Specimen: Blood   Result Value Ref Range    WBC 14.40 (H) 3.40 - 10.80 10*3/mm3    RBC 3.10 (L) 4.14 - 5.80 10*6/mm3    Hemoglobin 9.0 (L) 13.0 - 17.7 g/dL    Hematocrit 27.4 (L) 37.5 - 51.0 %    MCV 88.5 79.0 - 97.0 fL    MCH 29.2 26.6 - 33.0 pg    MCHC 33.0 31.5 - 35.7 g/dL    RDW 16.7 (H) 12.3 - 15.4 %    RDW-SD 52.5 37.0 - 54.0 fl    MPV 7.4 6.0 - 12.0 fL    Platelets 142 140 - 450 10*3/mm3   Blood Gas, Arterial -    Collection Time: 01/06/21  4:09 AM    Specimen: Arterial Blood   Result Value Ref Range    Site Right Radial     Ricardo's Test Positive     pH, Arterial 7.393 7.350 - 7.450 pH units    pCO2, Arterial 33.1 (L) 35.0 - 48.0 mm Hg    pO2, Arterial 63.4 (L) 83.0 - 108.0 mm Hg    HCO3, Arterial 20.2 (L) 21.0 - 28.0 mmol/L    Base Excess,  Arterial -4.2 (L) 0.0 - 3.0 mmol/L    O2 Saturation, Arterial 92.0 (L) 94.0 - 98.0 %    CO2 Content 21.2 (L) 22 - 29 mmol/L    Barometric Pressure for Blood Gas      Modality Room Air     Hemodilution No    COVID-19,CEPHEID,COR/KACI/PAD IN-HOUSE(OR EMERGENT/ADD-ON),NP SWAB IN TRANSPORT MEDIA 3-4 HR TAT - Swab, Nasopharynx    Collection Time: 01/06/21  6:24 AM    Specimen: Nasopharynx; Swab   Result Value Ref Range    COVID19 Not Detected Not Detected - Ref. Range   POC Glucose Once    Collection Time: 01/06/21  7:27 AM    Specimen: Blood   Result Value Ref Range    Glucose 78 70 - 105 mg/dL   Duplex Venous Lower Extremity - Bilateral CAR    Collection Time: 01/06/21  9:13 AM   Result Value Ref Range    Right Common Femoral Spont Y     Right Common Femoral Phasic Y     Right Common Femoral Augment Y     Right Common Femoral Competent Y     Right Common Femoral Compress C     Right Saphenofemoral Junction Compress C     Right Proximal Femoral Compress C     Right Mid Femoral Spont Y     Right Mid Femoral Phasic Y     Right Mid Femoral Augment Y     Right Mid Femoral Competent Y     Right Mid Femoral Compress C     Right Distal Femoral Compress C     Right Popliteal Spont Y     Right Popliteal Phasic Y     Right Popliteal Augment Y     Right Popliteal Competent Y     Right Popliteal Compress C     Right Posterior Tibial Compress C     Right Peroneal Compress C     Right GastronemiusSoleal Compress C     Right Greater Saph AK Compress C     Right Greater Saph BK Compress C     Right Lesser Saph Compress C     Left Common Femoral Spont Y     Left Common Femoral Phasic Y     Left Common Femoral Augment Y     Left Common Femoral Competent Y     Left Common Femoral Compress C     Left Saphenofemoral Junction Compress C     Left Proximal Femoral Compress C     Left Mid Femoral Spont Y     Left Mid Femoral Phasic Y     Left Mid Femoral Augment Y     Left Mid Femoral Competent Y     Left Mid Femoral Compress C     Left Distal  Femoral Compress C     Left Popliteal Spont Y     Left Popliteal Phasic Y     Left Popliteal Augment Y     Left Popliteal Competent Y     Left Popliteal Compress C     Left Posterior Tibial Compress C     Left Peroneal Compress C     Left GastronemiusSoleal Compress C     Left Greater Saph AK Compress C     Left Greater Saph BK Compress C     Left Lesser Saph Compress C    Adult Transthoracic Echo Complete W/ Cont if Necessary Per Protocol    Collection Time: 01/06/21 10:43 AM   Result Value Ref Range    BSA 2.1 m^2    RVIDd 2.7 cm    IVSd 1.0 cm    IVSs 1.8 cm    LVIDd 5.5 cm    LVIDs 3.9 cm    LVPWd 1.0 cm    BH CV ECHO RUSSELL - LVPWS 1.7 cm    IVS/LVPW 1.0     FS 28.2 %    EDV(Teich) 146.7 ml    ESV(Teich) 67.7 ml    EF(Teich) 53.9 %    EDV(cubed) 165.4 ml    ESV(cubed) 61.3 ml    EF(cubed) 62.9 %    % IVS thick 73.0 %    % LVPW thick 70.5 %    LV mass(C)d 216.1 grams    LV mass(C)dI 101.5 grams/m^2    LV mass(C)s 289.9 grams    LV mass(C)sI 136.1 grams/m^2    SV(Teich) 79.1 ml    SI(Teich) 37.1 ml/m^2    SV(cubed) 104.1 ml    SI(cubed) 48.8 ml/m^2    Ao root diam 3.2 cm    Ao root area 8.2 cm^2    ACS 2.0 cm    LVOT diam 2.1 cm    LVOT area 3.5 cm^2    EDV(MOD-sp4) 109.0 ml    ESV(MOD-sp4) 38.3 ml    EF(MOD-sp4) 64.8 %    SV(MOD-sp4) 70.7 ml    SI(MOD-sp4) 33.2 ml/m^2    Ao root area (BSA corrected) 1.5     LV Kirby Vol (BSA corrected) 51.2 ml/m^2    LV Sys Vol (BSA corrected) 18.0 ml/m^2    MV E max kasey 96.7 cm/sec    MV A max kasey 83.3 cm/sec    MV E/A 1.2     MV V2 max 101.4 cm/sec    MV max PG 4.1 mmHg    MV V2 mean 61.5 cm/sec    MV mean PG 1.7 mmHg    MV V2 VTI 31.3 cm    MVA(VTI) 2.4 cm^2    MV dec slope 418.7 cm/sec^2    MV dec time 0.23 sec    Ao pk kasey 136.3 cm/sec    Ao max PG 7.4 mmHg    Ao max PG (full) 2.1 mmHg    Ao V2 mean 97.3 cm/sec    Ao mean PG 4.1 mmHg    Ao mean PG (full) 1.2 mmHg    Ao V2 VTI 26.9 cm    CHRISTIANO(I,A) 2.7 cm^2    CHRISTIANO(I,D) 2.7 cm^2    CHRISTIANO(V,A) 2.9 cm^2    CHRISTIANO(V,D) 2.9 cm^2    LV V1  max PG 5.3 mmHg    LV V1 mean PG 2.9 mmHg    LV V1 max 115.1 cm/sec    LV V1 mean 78.8 cm/sec    LV V1 VTI 21.3 cm    SV(Ao) 221.8 ml    SI(Ao) 104.1 ml/m^2    SV(LVOT) 74.0 ml    SI(LVOT) 34.7 ml/m^2    PA V2 max 118.9 cm/sec    PA max PG 5.7 mmHg    PA max PG (full) 1.1 mmHg    PA V2 mean 86.5 cm/sec    PA mean PG 3.2 mmHg    PA mean PG (full) 1.1 mmHg    PA V2 VTI 29.0 cm    PA acc time 0.14 sec    RV V1 max PG 4.5 mmHg    RV V1 mean PG 2.1 mmHg    RV V1 max 106.6 cm/sec    RV V1 mean 66.4 cm/sec    RV V1 VTI 23.9 cm    TR max kasey 326.3 cm/sec    RVSP(TR) 50.6 mmHg    RAP systole 8.0 mmHg    PA pr(Accel) 15.2 mmHg     CV ECHO RUSSELL - BZI_BMI 27.1 kilograms/m^2     CV ECHO RUSSELL - BSA(HAYCOCK) 2.2 m^2     CV ECHO RUSSELL - BZI_METRIC_WEIGHT 90.7 kg     CV ECHO RUSSELL - BZI_METRIC_HEIGHT 182.9 cm    Target HR (85%) 133 bpm    Max. Pred. HR (100%) 157 bpm    EF(MOD-bp) 65.0 %    LA dimension(2D) 4.0 cm    Echo EF Estimated 60 %   POC Glucose Once    Collection Time: 01/06/21 11:08 AM    Specimen: Blood   Result Value Ref Range    Glucose 71 70 - 105 mg/dL   POC Glucose Once    Collection Time: 01/06/21  4:31 PM    Specimen: Blood   Result Value Ref Range    Glucose 85 70 - 105 mg/dL   POC Glucose Once    Collection Time: 01/06/21  8:42 PM    Specimen: Blood   Result Value Ref Range    Glucose 141 (H) 70 - 105 mg/dL        Imaging:  Adult Transthoracic Echo Complete W/ Cont if Necessary Per Protocol  · Estimated left ventricular EF = 60% Estimated left ventricular EF was in   agreement with the calculated left ventricular EF. Left ventricular   systolic function is normal.  · Estimated right ventricular systolic pressure from tricuspid   regurgitation is moderately elevated (45-55 mmHg).  · Moderate pulmonary hypertension is present.     FL Video Swallow With Speech Single Contrast  Narrative: DATE OF EXAM:  1/6/2021 12:30 PM     PROCEDURE:  FL VIDEO SWALLOW W SPEECH SINGLE-CONTRAST-     INDICATIONS:  Dysphagia.      COMPARISON:  No Comparisons Available     TECHNIQUE:   This examination was performed in conjunction with speech pathology.  Lateral video fluoroscopic evaluation of the swallowing mechanism was  performed while correlate administering to the patient various  consistency food items mixed with barium.     Fluoroscopic Time:   3.3 minutes     FINDINGS:  Please see the speech pathologist's report with regard to detailed  findings from the procedure and feeding recommendations.      Impression: Technically successful modified barium swallow examination.     Electronically Signed By-Erick Bautista MD On:1/6/2021 2:39 PM  This report was finalized on [id number] by  Erick Bautista MD.  Duplex Venous Lower Extremity - Bilateral CAR  · Normal bilateral lower extremity venous duplex scan.     NM Lung Ventilation Perfusion Aerosol  Narrative: DATE OF EXAM:  1/6/2021 9:16 AM     PROCEDURE:  NM LUNG VENTILATION PERFUSION AEROSOL-     INDICATIONS:  Chest pain, acute, PE suspected, intermed prob, negative D-dimer;  W19.XXXA-Unspecified fall, initial encounter; R53.1-Weakness;  N17.9-Acute kidney failure, unspecified; N39.0-Urinary tract infection,  site not specified; R31.9-Hematuria, unspecified     COMPARISON:  CT chest from 01/05/2021     TECHNIQUE:   The patient was ventilated with 49.8 mCi of technetium 99m pertechnetate  aerosol and ventilation images were acquired in multiple obliquities.  The patient then received 5.6 mCi of technetium 99m MAA intravenously  and perfusion images were acquired in multiple obliquities.     FINDINGS:  Examination is compromised due to patient not being cooperative with the  examination. Only anterior, posterior, and lateral images were obtained.  No definite wedge-shaped V/Q mismatches are identified to suggest  pulmonary embolus.     Impression: Compromised evaluation, although no definite evidence to suggest acute  pulmonary embolus.     Electronically Signed By-Javed Wiggins MD  On:1/6/2021 9:30 AM  This report was finalized on [id number] by  Javed Wiggins MD.       ASSESSMENT:    Fall    SRAVAN (acute kidney injury) (CMS/[facility])    Stage 3b chronic kidney disease    UTI (urinary tract infection), bacterial    Polypharmacy    E coli bacteremia    Acute respiratory failure with hypoxia (CMS/[facility])     pulm HTN    PLAN:  Low probability for PE   IS ? Flutter valve   Continue the current antibiotics  Bronchodilator  Inhaled corticosteroids  Electrolytes/ glycemic control  DVT and GI prophylaxis.    Total Critical care time in direct medical management (   ) minutes  Chetna Haines MD. D, ABSM.     1/6/2021  23:10 EST

## 2021-01-07 NOTE — THERAPY TREATMENT NOTE
Acute Care - Speech Language Pathology   Swallow Treatment Note  [facility]     Patient Name: Ronald Reyer  : 1955  MRN: [id number]  Today's Date: 2021               Admit Date: 2021    Visit Dx:     ICD-10-CM ICD-9-CM   1. Fall, initial encounter  W19.XXXA E888.9   2. Weakness  R53.1 780.79   3. Acute kidney injury (CMS/HCC)  N17.9 584.9   4. Urinary tract infection with hematuria, site unspecified  N39.0 599.0    R31.9 599.70     Patient Active Problem List   Diagnosis   • Fall   • SRAVAN (acute kidney injury) (CMS/HCC)   • Stage 3b chronic kidney disease   • UTI (urinary tract infection), bacterial   • Polypharmacy   • E coli bacteremia   • Acute respiratory failure with hypoxia (CMS/HCC)     Past Medical History:   Diagnosis Date   • Antisocial personality disorder (CMS/HCC)    • Bipolar disorder (CMS/HCC)    • Chronic kidney disease    • Convulsions (CMS/HCC)    • Diabetes mellitus (CMS/HCC)    • Hypertension    • Insomnia    • Renal disorder    • Schizophrenia (CMS/HCC)      History reviewed. No pertinent surgical history.     SWALLOW EVALUATION (last 72 hours)      SLP Adult Swallow Evaluation     Row Name 21 1400       Rehab Evaluation    Document Type  therapy note (daily note)  -EC    Subjective Information  no complaints  -EC    Patient Observations  alert;cooperative;agree to therapy  -EC    Patient/Family/Caregiver Comments/Observations  RN reports pt given ativan earlier due to combative behavior  -EC    Care Plan Review  evaluation/treatment results reviewed  -EC    Patient Effort  good  -EC    Comment  Pt awake, alert and amenable to repositioning and feeding assistance for lunch time meal. Pt fed by clinician.  Mild labial spillage noted intermittently w/NTL by spoon, no anterior spillage noted w/bites of mech soft or puree.  Oral transit and mastication appear WFL. Pt does not appear SOA or fatigued w/PO. No clinical s/s of aspiration demonstrated.  Recommend continue current diet.  ST will continue to follow pt for diet tolerance w/further recommendations as indicated.  -EC    Symptoms Noted During/After Treatment  none  -EC             Swallow Goals (SLP)    Oral Nutrition/Hydration Goal Selection (SLP)  oral nutrition/hydration, SLP goal 1;oral nutrition/hydration, SLP goal 2  -EC       Oral Nutrition/Hydration Goal 1 (SLP)    Oral Nutrition/Hydration Goal 1, SLP  Pt will tolerate recommended diet with no s/s aspiration   -EC    Time Frame (Oral Nutrition/Hydration Goal 1, SLP)  by discharge  -EC    Barriers (Oral Nutrition/Hydration Goal 1, SLP)  see above note  -EC    Progress/Outcomes (Oral Nutrition/Hydration Goal 1, SLP)  goal ongoing;good progress toward goal  -EC       Oral Nutrition/Hydration Goal 2 (SLP)    Oral Nutrition/Hydration Goal 2, SLP  Pt will verbalize/demonstrate understanding of safe swallow strategies & techniques with max cues as required.   -EC    Time Frame (Oral Nutrition/Hydration Goal 2, SLP)  2 days;3 days  -EC    Barriers (Oral Nutrition/Hydration Goal 2, SLP)  --    Progress/Outcomes (Oral Nutrition/Hydration Goal 2, SLP)  goal ongoing  -EC      User Key  (r) = Recorded By, (t) = Taken By, (c) = Cosigned By    Initials Name Effective Dates    Violet Vega 03/01/19 -     Concha Grullon SLP 06/17/19 -           EDUCATION  The patient has been educated in the following areas:   Modified Diet Instruction.    SLP Recommendation and Plan                                                                  SLP GOALS     Row Name 01/07/21 1400 01/06/21 1600 01/05/21 1500       Oral Nutrition/Hydration Goal 1 (SLP)    Oral Nutrition/Hydration Goal 1, SLP  Pt will tolerate recommended diet with no s/s aspiration   -EC  Pt will tolerate recommended diet with no s/s aspiration   -EC  Pt will tolerate recommended diet with no s/s aspiration   -MF    Time Frame (Oral Nutrition/Hydration Goal 1, SLP)  by discharge  -EC  by discharge  -EC  by discharge  -     Barriers (Oral Nutrition/Hydration Goal 1, SLP)  see above note  -EC  Pt participated in VFSS today, see above note  -EC  Pt participated in limited meal assessment this date due to need to urinate. Pt largely appeared to tolerate mech soft and thins based on today's session; however pt with x1 instance of coughing with large drink of liquids. Pt may benefit from VFSS tomorrow as appropriate.   -MF    Progress/Outcomes (Oral Nutrition/Hydration Goal 1, SLP)  goal ongoing;good progress toward goal  -EC  goal ongoing  -EC  goal ongoing  -MF       Oral Nutrition/Hydration Goal 2 (SLP)    Oral Nutrition/Hydration Goal 2, SLP  Pt will verbalize/demonstrate understanding of safe swallow strategies & techniques with max cues as required.   -EC  Pt will verbalize/demonstrate understanding of safe swallow strategies & techniques with max cues as required.   -EC  Pt will verbalize/demonstrate understanding of safe swallow strategies & techniques with max cues as required.   -MF    Time Frame (Oral Nutrition/Hydration Goal 2, SLP)  2 days;3 days  -EC  2 days;3 days  -EC  2 days;3 days  -    Barriers (Oral Nutrition/Hydration Goal 2, SLP)  --  --  Pt seen for meal assessment at lunch time meal. Pt did not attempt to feed himself, so SLP provided all trials. Pt accepted trials of thins via straw, puree, and mech soft. Noted slightly slow mastication of mech soft solids, however clear his oral cavity independently. No residue observed. Pt coughed following 1/5 trials of thin liquids due to pt impulsively taking large consecutive drinks via straw. No further overt s/s of aspiration observed at any time. However, pt noted to have shallow, dyspneic breathing throughout session. Pt to have STAT CT chest this date. Will continue to follow.   -    Progress/Outcomes (Oral Nutrition/Hydration Goal 2, SLP)  goal ongoing  -EC  goal ongoing  -EC  goal ongoing  -      User Key  (r) = Recorded By, (t) = Taken By, (c) = Cosigned By     Initials Name Provider Type    Violet Vega Speech and Language Pathologist    Concha Grullon, SLP Speech and Language Pathologist             Time Calculation:       Therapy Charges for Today     Code Description Service Date Service Provider Modifiers Qty    44490550446 HC ST TREATMENT SWALLOW 3 1/6/2021 Violet Duong GN 1    90445781927 HC ST MOTION FLUORO EVAL SWALLOW 6 1/6/2021 Violet Duong GN 1               Violet Duong  1/7/2021

## 2021-01-07 NOTE — THERAPY TREATMENT NOTE
Patient Name: Ronald Reyer  : 1955    MRN: [id number]                              Today's Date: 2021       Admit Date: 2021    Visit Dx:     ICD-10-CM ICD-9-CM   1. Fall, initial encounter  W19.XXXA E888.9   2. Weakness  R53.1 780.79   3. Acute kidney injury (CMS/HCC)  N17.9 584.9   4. Urinary tract infection with hematuria, site unspecified  N39.0 599.0    R31.9 599.70     Patient Active Problem List   Diagnosis   • Fall   • SRAVAN (acute kidney injury) (CMS/[facility])   • Stage 3b chronic kidney disease   • UTI (urinary tract infection), bacterial   • Polypharmacy   • E coli bacteremia   • Acute respiratory failure with hypoxia (CMS/[facility])     Past Medical History:   Diagnosis Date   • Antisocial personality disorder (CMS/[facility])    • Bipolar disorder (CMS/[facility])    • Chronic kidney disease    • Convulsions (CMS/[facility])    • Diabetes mellitus (CMS/[facility])    • Hypertension    • Insomnia    • Renal disorder    • Schizophrenia (CMS/[facility])      History reviewed. No pertinent surgical history.  General Information     Row Name 21 1634          Physical Therapy Time and Intention    Document Type  therapy note (daily note)  -     Mode of Treatment  physical therapy  -     Row Name 21 1634          Cognition    Orientation Status (Cognition)  oriented to;person;verbal cues/prompts needed for orientation  -     Row Name 21 1634          Safety Issues, Functional Mobility    Safety Issues Affecting Function (Mobility)  ability to follow commands;judgment;problem-solving  -     Impairments Affecting Function (Mobility)  strength;coordination;endurance/activity tolerance  -     Cognitive Impairments, Mobility Safety/Performance  attention;judgment  -       User Key  (r) = Recorded By, (t) = Taken By, (c) = Cosigned By    Initials Name Provider Type     Aury Smith PTA Physical Therapy Assistant        Mobility     Row Name 21 1635          Bed Mobility    Bed Mobility  bed mobility (all)  activities  -     All Activities, [place] (Bed Mobility)  moderate assist (50% patient effort)  -     Assistive Device (Bed Mobility)  head of bed elevated  -     Comment (Bed Mobility)  Struggling c motor planning to eob, pt. would attempt sitting up, then would lie back down stating he was up. Confused.  -       User Key  (r) = Recorded By, (t) = Taken By, (c) = Cosigned By    Initials Name Provider Type     Aury Smith PTA Physical Therapy Assistant        Obj/Interventions     [facility] Name 01/07/21 1636          Balance    Static Sitting Balance  moderate impairment  -     Dynamic Sitting Balance  severe impairment  -     Static Standing Balance  not tested  -       User Key  (r) = Recorded By, (t) = Taken By, (c) = Cosigned By    Initials Name Provider Type     Aury Smith PTA Physical Therapy Assistant        Goals/Plan    No documentation.       Clinical Impression     [facility] Name 01/07/21 1637          Pain    Additional Documentation  Pain Scale: FACES Pre/Post-Treatment (Group)  -[facility] Name 01/07/21 1637          Pain Scale: FACES Pre/Post-Treatment    Pain: FACES Scale, Pretreatment  0-->no hurt  -     Posttreatment Pain Rating  2-->hurts little bit  -[facility] Name 01/07/21 1637          Plan of Care Review    Plan of Care Reviewed With  patient  -     Progress  declining  -     Outcome Summary  Required mod/max A for safe bed mobility to eob. Unable to bring cog over jose, decreased motor planning limited by confusion. Several attempts to sit eob, pt. unable to remain on task. Returned to supine, low activity tolerance. Will progress as able, will need rehab at d/c to address deficits. PPE worn: Mask, gloves, gown shield.  -[facility] Name 01/07/21 1637          Vital Signs    O2 Delivery Pre Treatment  supplemental O2  -     O2 Delivery Intra Treatment  supplemental O2  -     O2 Delivery Post Treatment  supplemental O2  -     Pre Patient Position  Supine  -      Intra Patient Position  Sitting  -     Post Patient Position  Supine  -     Row Name 01/07/21 1637          Positioning and Restraints    Pre-Treatment Position  in bed  -LH     Post Treatment Position  bed  -LH     In Bed  notified nsg;fowlers;call light within reach;side rails up x3;exit alarm on  -       User Key  (r) = Recorded By, (t) = Taken By, (c) = Cosigned By    Initials Name Provider Type     Aury Smith PTA Physical Therapy Assistant        Outcome Measures     Row Name 01/07/21 1640          How much help from another person do you currently need...    Turning from your back to your side while in flat bed without using bedrails?  2  -LH     Moving from lying on back to sitting on the side of a flat bed without bedrails?  2  -LH     Moving to and from a bed to a chair (including a wheelchair)?  1  -LH     Standing up from a chair using your arms (e.g., wheelchair, bedside chair)?  1  -LH     Climbing 3-5 steps with a railing?  1  -LH     To walk in hospital room?  1  -     AM-PAC 6 Clicks Score (PT)  8  -     Row Name 01/07/21 1640          Modified Madison Scale    Modified Helene Scale  5 - Severe disability.  Bedridden, incontinent, and requiring constant nursing care and attention.  -       User Key  (r) = Recorded By, (t) = Taken By, (c) = Cosigned By    Initials Name Provider Type     Aury Smith PTA Physical Therapy Assistant        Physical Therapy Education                 Title: PT OT SLP Therapies (In Progress)     Topic: Physical Therapy (In Progress)     Point: Mobility training (In Progress)     Learning Progress Summary           Patient Nonacceptance, E,D, NL by  at 1/7/2021 1640    Comment: Attempted to instruct log rolling to eob and ergonomics for sidelying to sit.    Acceptance, E,TB, VU by KM at 1/7/2021 0215    Acceptance, E,TB, NR by  at 1/6/2021 1655    Acceptance, E,TB, VU by KM at 1/6/2021 0244    Acceptance, E,TB, VU,NR by  at 1/4/2021 9240     Acceptance, E, NR,Bed IU by SC at 1/4/2021 1849    Acceptance, E,TB, VU,NR,DU by  at 1/2/2021 1426                   Point: Home exercise program (In Progress)     Learning Progress Summary           Patient Nonacceptance, E,D, NL by  at 1/7/2021 1640    Comment: Attempted to instruct log rolling to eob and ergonomics for sidelying to sit.    Acceptance, E,TB, VU by KM at 1/7/2021 0215    Acceptance, E,TB, VU by KM at 1/6/2021 0244    Acceptance, E,TB, VU,NR by  at 1/4/2021 2328    Acceptance, E, NR,Bed IU by SC at 1/4/2021 1849    Acceptance, E,TB, VU,NR,DU by  at 1/2/2021 1426                   Point: Body mechanics (In Progress)     Learning Progress Summary           Patient Nonacceptance, E,D, NL by  at 1/7/2021 1640    Comment: Attempted to instruct log rolling to eob and ergonomics for sidelying to sit.    Acceptance, E,TB, VU by KM at 1/7/2021 0215    Acceptance, E,TB, VU by KM at 1/6/2021 0244    Acceptance, E,TB, VU,NR by  at 1/4/2021 2328    Acceptance, E, NR,Bed IU by SC at 1/4/2021 1849    Acceptance, E,TB, VU,NR,DU by  at 1/2/2021 1426                   Point: Precautions (In Progress)     Learning Progress Summary           Patient Nonacceptance, E,D, NL by  at 1/7/2021 1640    Comment: Attempted to instruct log rolling to eob and ergonomics for sidelying to sit.    Acceptance, E,TB, VU by KM at 1/7/2021 0215    Acceptance, E,TB, NR by  at 1/6/2021 1655    Acceptance, E,TB, VU by KM at 1/6/2021 0244    Acceptance, E,TB, VU,NR by  at 1/4/2021 2328    Acceptance, E, NR,Bed IU by SC at 1/4/2021 1849    Acceptance, E,TB, VU,NR,DU by  at 1/2/2021 1426                               User Key     Initials Effective Dates Name Provider Type Discipline    SC 03/01/19 -  Apple Nguyen, PTA Physical Therapy Assistant PT     03/01/19 -  Aury Smith PTA Physical Therapy Assistant PT     06/23/20 -  Hardy Cheng, PT Physical Therapist PT     04/01/20 -  Lisa Andino, RN  Registered Nurse Nurse    KM 10/23/19 -  Kathie Townsend, RN Registered Nurse Nurse     01/07/20 -  Eloisa Nava, URIEL Physical Therapist PT              PT Recommendation and Plan     Plan of Care Reviewed With: patient  Progress: declining  Outcome Summary: Required mod/max A for safe bed mobility to eob. Unable to bring cog over jose, decreased motor planning limited by confusion. Several attempts to sit eob, pt. unable to remain on task. Returned to supine, low activity tolerance. Will progress as able, will need rehab at d/c to address deficits. PPE worn: Mask, gloves, gown shield.     Time Calculation:   PT Charges     Row Name 01/07/21 1642             Time Calculation    Start Time  0320  -      Stop Time  0337  -      Time Calculation (min)  17 min  -      PT Received On  01/07/21  -      PT - Next Appointment  01/10/21  -         Time Calculation- PT    Total Timed Code Minutes- PT  17 minute(s)  -         Timed Charges    90078 - PT Therapeutic Activity Minutes  17  -        User Key  (r) = Recorded By, (t) = Taken By, (c) = Cosigned By    Initials Name Provider Type     Aury Smith PTA Physical Therapy Assistant        Therapy Charges for Today     Code Description Service Date Service Provider Modifiers Qty    [id number]  PT THERAPEUTIC ACT EA 15 MIN 1/7/2021 Aury Smith PTA GP 1          PT G-Codes  Outcome Measure Options: AM-PAC 6 Clicks Basic Mobility (PT)  AM-PAC 6 Clicks Score (PT): 8  Modified Helene Scale: 5 - Severe disability.  Bedridden, incontinent, and requiring constant nursing care and attention.    Aury Smith PTA  1/7/2021     absent

## 2021-01-07 NOTE — PROGRESS NOTES
Discharge Planning Assessment  [facility]     Patient Name: Ronald Reyer  MRN: [id number]  Today's Date: 1/7/2021    Admit Date: 1/1/2021    Discharge Needs Assessment     Row Name 01/07/21 1239       Living Environment    Lives With  facility resident [facility]    Current Living Arrangements  extended care facility    Able to Return to Prior Arrangements  yes       Transition Planning    Patient/Family Anticipates Transition to  long-term care facility    Transportation Anticipated  health plan transportation       Discharge Needs Assessment    Readmission Within the Last 30 Days  no previous admission in last 30 days    Concerns to be Addressed  care coordination/care conferences;discharge planning    Provided Post Acute Provider List?  N/A        Discharge Plan     Row Name 01/07/21 1240       Plan    Plan Comments  Anticipate return to [facility], [facility] once cleared by pulmonology and nephrology. Return okay per facility liason and patient's sister per previous MSW note. No PASRR or precert needed for return.        Continued Care and Services - Admitted Since 1/1/2021     Destination     Service Provider Request Status Selected Services Address Phone Fax Patient Preferred    [facility]  Accepted N/A [address] [place] [id number] [phone] --                Demographic Summary     Row Name 01/07/21 1239       General Information    Admission Type  inpatient    Reason for Consult  discharge planning        No physical contact with patient.    Jaz Blanchard RN       Office Phone: [phone]  Office Cell: [phone]

## 2021-01-07 NOTE — PROGRESS NOTES
[facility] Medicine Services  INPATIENT PROGRESS NOTE  2116/1   Hospitalist Team  LOS 6 days      Patient Care Team:  Provider, No Known as PCP - Vincent Yao MD as Consulting Physician (Hospitalist)      Patient was examined with relevant and adequate PPE keeping in mind the current coronavirus pandemic. Minimum of 10 minutes to don and doff PPE.    Chief Complaint / Subjective  Chief Complaint   Patient presents with   • Fall       HPI (4 hpi elements or status of 3 chronic)     65-year-old male presents the ER with a chief complaint of left elbow pain after being found on the floor at the [facility] today.  The patient reports he does not remember falling.  He states he probably just tripped.  The patient told me that he has only been at the [facility] for approximately a week.  However, with review of records it looks that the patient is a long-term [facility] resident.    Patient unable to provide any history drowsy.    No change.  Still nonverbal mostly    Unsure of the baseline.  Patient agitated in the daytime otherwise answers in monosyllables right now 01/04/21, 6:13 PM EST    No significant change. On room air 01/05/21, 2:36 PM EST    Complains of thirst 01/06/21, 6:51 PM EST    Agitated and swinging at nurses 01/07/21, 12:30 PM EST      Fall          History taken from: chart    Review of Systems   Unable to perform ROS: dementia             Family History   Problem Relation Age of Onset   • No Known Problems Mother    • No Known Problems Father        Past Medical History:   Diagnosis Date   • Antisocial personality disorder (CMS/HCC)    • Bipolar disorder (CMS/HCC)    • Chronic kidney disease    • Convulsions (CMS/HCC)    • Diabetes mellitus (CMS/HCC)    • Hypertension    • Insomnia    • Renal disorder    • Schizophrenia (CMS/HCC)        Social History     Socioeconomic History   • Marital status:      Spouse name: Not on file   • Number of children: Not on file   • Years of  education: Not on file   • Highest education level: Not on file   Tobacco Use   • Smoking status: Current Every Day Smoker     Packs/day: 0.50     Types: Cigarettes   • Smokeless tobacco: Never Used   Substance and Sexual Activity   • Alcohol use: Never     Frequency: Never   • Drug use: Never   • Sexual activity: Defer       Prior to Admission medications    Medication Sig Start Date End Date Taking? Authorizing Provider   acetaminophen (TYLENOL) 325 MG tablet Take 650 mg by mouth Every 4 (Four) Hours As Needed for Mild Pain  or Fever.   Yes Jesus Bowser MD   atenolol (TENORMIN) 50 MG tablet Take 50 mg by mouth Daily.   Yes Jesus Bowser MD   divalproex (DEPAKOTE) 125 MG DR tablet Take 125 mg by mouth Daily.   Yes Jesus Bowser MD   divalproex (DEPAKOTE) 250 MG DR tablet Take 250 mg by mouth Daily.   Yes Jesus Bowser MD   divalproex (DEPAKOTE) 500 MG DR tablet Take 500 mg by mouth Every Night.   Yes Jesus Bowser MD   fluPHENAZine (PROLIXIN) 1 MG tablet Take 1 mg by mouth 3 (Three) Times a Day.   Yes Jesus Bowser MD   glucagon (GlucaGen HypoKit) 1 MG injection Infuse 1 mg into a venous catheter 2 (Two) Times a Day As Needed for Low Blood Sugar.   Yes Jesus Bowser MD   ibuprofen (ADVIL,MOTRIN) 800 MG tablet Take 800 mg by mouth Every 8 (Eight) Hours As Needed for Mild Pain .   Yes Jesus Bowser MD   insulin glargine (LANTUS, SEMGLEE) 100 UNIT/ML injection Inject 20 Units under the skin into the appropriate area as directed Every Night.   Yes Jesus Bowser MD   lisinopril (PRINIVIL,ZESTRIL) 5 MG tablet Take 5 mg by mouth Daily.   Yes Jesus Bowser MD   melatonin 5 MG tablet tablet Take 10 mg by mouth Every Night.   Yes Jesus Bowser MD   Menthol, Topical Analgesic, (Biofreeze) 4 % gel Apply  topically 2 (Two) Times a Day As Needed. BL Feet legs back and hips   Yes Jesus Bowser MD   mirtazapine (REMERON) 7.5 MG tablet  "Take 7.5 mg by mouth Every Night.   Yes Jesus Bowser MD   Prenatal Vit-Fe Fumarate-FA (M-Vit) tablet Take 1 tablet by mouth Daily.   Yes Jesus Bowser MD   promethazine (PHENERGAN) 12.5 MG tablet Take 12.5 mg by mouth Every 6 (Six) Hours As Needed for Nausea or Vomiting.   Yes Jesus Bowser MD   QUEtiapine (SEROquel) 100 MG tablet Take 100 mg by mouth Every Night.   Yes Jesus Bowser MD        Objective    Physical Exam     Vital Signs  Temp:  [97.8 °F (36.6 °C)-100.5 °F (38.1 °C)] 98.9 °F (37.2 °C)  Heart Rate:  [69-80] 76  Resp:  [14-26] 18  BP: (148-177)/(51-82) 177/63    Oxygen Therapy  SpO2: 100 %  Pulse Oximetry Type: Intermittent  Device (Oxygen Therapy): room air  Flow (L/min): 1  Oxygen Concentration (%): 21  Flowsheet Rows      First Filed Value   Admission Height  182.9 cm (72\") Documented at 01/01/2021 1711   Admission Weight  90.3 kg (199 lb) Documented at 01/01/2021 1711        Weight change: -0.388 kg (-13.7 oz)   Intake & Output (last 3 days)       01/04 0701 - 01/05 0700 01/05 0701 - 01/06 0700 01/06 0701 - 01/07 0700 01/07 0701 - 01/08 0700    P.O. 640 480 840 240    I.V. (mL/kg)  487.8 (5.4) 1034 (11.4)     Total Intake(mL/kg) 640 (7) 967.8 (10.7) 1874 (20.7) 240 (2.7)    Urine (mL/kg/hr) 125 (0.1)       Stool 0       Total Output 125       Net +515 +967.8 +1874 +240            Urine Unmeasured Occurrence 3 x 6 x 9 x 1 x    Stool Unmeasured Occurrence 1 x 2 x 1 x 1 x        Lines, Drains & Airways    Active LDAs     Name:   Placement date:   Placement time:   Site:   Days:    Peripheral IV 01/02/21 0100 Anterior;Proximal;Right;Upper Arm   01/02/21    0100    Arm   less than 1                Physical Exam:    Physical Exam  Vitals signs and nursing note reviewed.   Constitutional:       General: He is not in acute distress.     Appearance: Normal appearance. He is well-developed. He is not ill-appearing, toxic-appearing or diaphoretic.   HENT:      Head: " Normocephalic and atraumatic.      Right Ear: Ear canal and external ear normal.      Left Ear: Ear canal and external ear normal.      Nose: Nose normal. No congestion or rhinorrhea.      Mouth/Throat:      Mouth: Mucous membranes are dry.      Pharynx: No oropharyngeal exudate.   Eyes:      General: No scleral icterus.        Right eye: No discharge.         Left eye: No discharge.      Extraocular Movements: Extraocular movements intact.      Conjunctiva/sclera: Conjunctivae normal.      Pupils: Pupils are equal, round, and reactive to light.   Neck:      Musculoskeletal: Normal range of motion and neck supple. No neck rigidity or muscular tenderness.      Thyroid: No thyromegaly.      Vascular: No carotid bruit or JVD.      Trachea: No tracheal deviation.   Cardiovascular:      Rate and Rhythm: Normal rate and regular rhythm.      Pulses: Normal pulses.      Heart sounds: Normal heart sounds. No murmur. No friction rub. No gallop.    Pulmonary:      Effort: Pulmonary effort is normal. No respiratory distress.      Breath sounds: No stridor. Wheezing present. No rhonchi or rales.      Comments: Rapid shallow breathing  Chest:      Chest wall: No tenderness.   Abdominal:      General: Bowel sounds are normal. There is no distension.      Palpations: Abdomen is soft. There is no mass.      Tenderness: There is no abdominal tenderness. There is no guarding or rebound.      Hernia: No hernia is present.   Musculoskeletal: Normal range of motion.         General: No swelling, tenderness, deformity or signs of injury.      Right lower leg: No edema.      Left lower leg: No edema.   Lymphadenopathy:      Cervical: No cervical adenopathy.   Skin:     General: Skin is warm and dry.      Coloration: Skin is not jaundiced or pale.      Findings: No bruising, erythema or rash.   Neurological:      General: No focal deficit present.      Mental Status: He is alert. Mental status is at baseline.      Cranial Nerves: No cranial  nerve deficit.      Sensory: No sensory deficit.      Motor: No weakness or abnormal muscle tone.      Coordination: Coordination normal.     Noted        PT Recommendation and Plan             Procedures:    * No surgery found *     Assessment/Plan with Problem wise       Active Hospital Problems    Diagnosis  POA   • E coli bacteremia [R78.81, B96.20]  Yes     Priority: Medium   • Acute respiratory failure with hypoxia (CMS/[facility]) [J96.01]  Yes   • SRAVAN (acute kidney injury) (CMS/[facility]) [N17.9]  Yes   • Stage 3b chronic kidney disease [N18.32]  Yes   • UTI (urinary tract infection), bacterial [N39.0, A49.9]  Yes   • Polypharmacy [Z79.899]  Not Applicable   • Fall [W19.XXXA]  Yes      Resolved Hospital Problems    Diagnosis Date Resolved POA   • **Acute pulmonary embolism without acute cor pulmonale (CMS/[facility]) [I26.99] 01/06/2021 Clinically Undetermined     Priority: High        Estimated Creatinine Clearance: 39.3 mL/min (A) (by C-G formula based on SCr of 2.46 mg/dL (H)).    Code Status and Medical Interventions:   Ordered at: 01/01/21 2129     Code Status:    CPR     Medical Interventions (Level of Support Prior to Arrest):    Full       MEDICAL DECISION MAKING COMPLEXITY BY PROBLEM:     E. coli bacteremia  Secondary to UTI  Meropenem    UTI:  Cultures  Empiric antibiotics till cultures finalize  Remove Mclain when not needed  Screening urinalysis should not be done and is not recommended by any professional society or organization.  Does not achieve any purpose except antibiotic resistance and adverse effects like C. difficile colitis      Renal failure/insufficiency/injury:  Hydration  Supportive treatment  Cut down or hold ACE inhibitors  Avoid nephrotoxic medications   Address diuretics      Polypharmacy probably contributing to his current condition and constipation.  And impaired intake.    Care coordination with nursing 01/02/21 10:15 AM EST.    Care coordination with nursing and pharmacy regarding  his  antibiotics    Stable.  Continue antibiotics for 14 days discharge planning 1 to 2 days    Appreciate nephrology and ID input.  Need to rule out PE.  Care coordination with nursing for current care 01/05/21, 2:37 PM EST    V/Q ruled out by suboptimal study.  Care coordination with Dr. Elias  Still not in shape for discharge planning.    Patient looks better.  This appears to be his baseline shall review with nephrology and pulmonology for discharge planning.      Plan for disposition:        SLP OP Goals     Row Name 01/06/21 9686          Time Calculation    PT Goal Re-Cert Due Date  01/20/21  -NAA       User Key  (r) = Recorded By, (t) = Taken By, (c) = Cosigned By    Initials Name Provider Type    Hardy Morrison, PT Physical Therapist          Continued Care and Services - Admitted Since 1/1/2021    Coordination has not been started for this encounter.        Historical & Objective Data     Results Review:    I reviewed the patient's new lab and radiology results. 01/07/21     Results from last 7 days   Lab Units 01/07/21 0216 01/06/21 0259 01/05/21 0351 01/01/21  1733   WBC 10*3/mm3 10.00 14.40* 17.30*   < > 15.30*   HEMOGLOBIN g/dL 8.5* 9.0* 9.0*   < > 10.4*   HEMOGLOBIN, POC   --   --   --    < >  --    HEMATOCRIT % 25.9* 27.4* 27.6*   < > 31.3*   HEMATOCRIT POC   --   --   --    < >  --    MCV fL 89.8 88.5 90.0   < > 89.1   MCH pg 29.5 29.2 29.5   < > 29.6   MPV fL 7.4 7.4 7.8   < > 7.5   RDW % 17.4* 16.7* 16.9*   < > 16.2*   PLATELETS 10*3/mm3 142 142 115*   < > 162   MONOCYTES % %  --   --   --   --  17.0*    < > = values in this interval not displayed.     Results from last 7 days   Lab Units 01/07/21  0216 01/06/21  0259 01/05/21  0351 01/04/21  0324 01/03/21  1752  01/01/21  1733   SODIUM mmol/L 140 136 132* 133* 132*  --  133*   POTASSIUM mmol/L 5.0 4.7 4.5 5.1 5.4*   < > 4.7   CHLORIDE mmol/L 111* 107 101 103 99   < > 99   CO2 mmol/L 21.0* 21.0* 21.0* 20.0* 24.0   < > 24.0   BUN mg/dL 72* 72* 75* 69*  66*   < > 35*   CREATININE mg/dL 2.46* 2.63* 2.96* 3.41* 3.44*   < > 2.24*   CALCIUM mg/dL 8.2* 8.4* 8.3* 8.7 8.7   < > 8.9   MAGNESIUM mg/dL  --  2.1 2.0  --  1.9  --   --    BILIRUBIN mg/dL  --   --   --  0.4 0.3  --  0.4   ALK PHOS U/L  --   --   --  274* 307*  --  100   ALT (SGPT) U/L  --   --   --  20 22  --  12   AST (SGOT) U/L  --   --   --  34 37  --  22   GLUCOSE mg/dL 110* 95 93 79 107*   < > 115*    < > = values in this interval not displayed.     Inflammatory Biomarkers        Invalid input(s): ESR, D-DIMER QUANTITATIVE,  PROCALCITONIN,    Lab Results   Component Value Date    PHOS 3.8 01/07/2021     No results found for: HGBA1C  No results found for: CHOL, CHLPL, TRIG, HDL, LDL, LDLDIRECT  No results found for: LIPASE        Results from last 7 days   Lab Units 01/06/21  0409   PH, ARTERIAL pH units 7.393   PO2 ART mm Hg 63.4*   PCO2, ARTERIAL mm Hg 33.1*   HCO3 ART mmol/L 20.2*     Pathology  No results found for: INTRAOP, PREDX, FINALDX, COMDX  COVID19   Date Value Ref Range Status   01/06/2021 Not Detected Not Detected - Ref. Range Final        Microbiology Results (last 10 days)     Procedure Component Value - Date/Time    COVID-19,CEPHEID,COR/KACI/PAD IN-HOUSE(OR EMERGENT/ADD-ON),NP SWAB IN TRANSPORT MEDIA 3-4 HR TAT - Swab, Nasopharynx [[id number]]  (Normal) Collected: 01/06/21 0624    Lab Status: Final result Specimen: Swab from Nasopharynx Updated: 01/06/21 0712     COVID19 Not Detected    Narrative:      Fact sheet for providers: https://www.fda.gov/media/741722/download     Fact sheet for patients: https://www.fda.gov/media/958036/download    Blood Culture - Blood, Arm, Left [[id number]]  (Abnormal) Collected: 01/01/21 9627    Lab Status: Final result Specimen: Blood from Arm, Left Updated: 01/04/21 1031     Blood Culture Escherichia coli ESBL     Comment: Consider infectious disease consult.  Susceptibility results may not correlate to clinical outcomes.  For ESBL-producing infections in the  blood, a carbapenem is recommended as first-line therapy for optimal clinical outcomes.        Isolated from Aerobic and Anaerobic Bottles     Gram Stain Anaerobic Bottle Gram negative bacilli      Aerobic Bottle Gram negative bacilli    Narrative:      Refer to previous blood culture collected on 1/1/2021 2255 for MICs.    Blood Culture - Blood, Hand, Left [[id number]]  (Abnormal)  (Susceptibility) Collected: 01/01/21 2255    Lab Status: Final result Specimen: Blood from Hand, Left Updated: 01/04/21 1030     Blood Culture Escherichia coli ESBL     Comment: Consider infectious disease consult.  Susceptibility results may not correlate to clinical outcomes.  For ESBL-producing infections in the blood, a carbapenem is recommended as first-line therapy for optimal clinical outcomes.        Isolated from Aerobic and Anaerobic Bottles     Gram Stain Anaerobic Bottle Gram negative bacilli      Aerobic Bottle Gram negative bacilli    Susceptibility      Escherichia coli ESBL     ANA     Ertapenem Susceptible     Meropenem Susceptible                Susceptibility Comments     Escherichia coli ESBL    Cefazolin sensitivity will not be reported for Enterobacteriaceae in non-urine isolates. If cefazolin is preferred, please call the microbiology lab to request an E-test.  With the exception of urinary-sourced infections, aminoglycosides should not be used as monotherapy.             Blood Culture ID, PCR - Blood, Hand, Left [[id number]]  (Abnormal) Collected: 01/01/21 2255    Lab Status: Final result Specimen: Blood from Hand, Left Updated: 01/02/21 1047     BCID, PCR Escherichia coli. Identification by BCID PCR.     BOTTLE TYPE Anaerobic Bottle    COVID-19, ABBOTT IN-HOUSE,NASAL Swab (NO TRANSPORT MEDIA) 2 HR TAT - Swab, Nasopharynx [[id number]]  (Normal) Collected: 01/01/21 2013    Lab Status: Final result Specimen: Swab from Nasopharynx Updated: 01/01/21 2037     COVID19 Presumptive Negative    Narrative:      Fact sheet for  providers: https://www.fda.gov/media/000540/download     Fact sheet for patients: https://www.fda.gov/media/160640/download    Test performed by PCR.  If inconsistent with clinical signs and symptoms patient should be tested with different authorized molecular test.    Urine Culture - Urine, Urine, Catheter [[id number]]  (Abnormal)  (Susceptibility) Collected: 01/01/21 1852    Lab Status: Final result Specimen: Urine, Catheter Updated: 01/03/21 0208     Urine Culture >100,000 CFU/mL Escherichia coli ESBL     Comment: Consider infectious disease consult.  Susceptibility results may not correlate to clinical outcomes.       Susceptibility      Escherichia coli ESBL     ANA     Amikacin Susceptible     Ertapenem Susceptible     Gentamicin Resistant     Levofloxacin Resistant     Meropenem Susceptible     Nitrofurantoin Susceptible     Piperacillin + Tazobactam Susceptible     Tetracycline Resistant     Tobramycin Resistant     Trimethoprim + Sulfamethoxazole Resistant                          ECG/EMG Results (most recent)     Procedure Component Value Units Date/Time    ECG 12 Lead [[id number]] Collected: 01/01/21 1728     Updated: 01/01/21 1729     QT Interval 371 ms     Narrative:      HEART RATE= 87  bpm  RR Interval= 692  ms  UT Interval= 120  ms  P Horizontal Axis= 5  deg  P Front Axis= 54  deg  QRSD Interval= 95  ms  QT Interval= 371  ms  QRS Axis= 23  deg  T Wave Axis= 76  deg  - ABNORMAL ECG -  Sinus rhythm  ST elevation, consider anterior injury  Electronically Signed By:   Date and Time of Study: 2021-01-01 17:28:09    Adult Transthoracic Echo Complete W/ Cont if Necessary Per Protocol [[id number]] Collected: 01/06/21 1005     Updated: 01/06/21 2012     BSA 2.1 m^2      RVIDd 2.7 cm      IVSd 1.0 cm      IVSs 1.8 cm      LVIDd 5.5 cm      LVIDs 3.9 cm      LVPWd 1.0 cm      BH CV ECHO RUSSELL - LVPWS 1.7 cm      IVS/LVPW 1.0     FS 28.2 %      EDV(Teich) 146.7 ml      ESV(Teich) 67.7 ml      EF(Teich) 53.9 %       EDV(cubed) 165.4 ml      ESV(cubed) 61.3 ml      EF(cubed) 62.9 %      % IVS thick 73.0 %      % LVPW thick 70.5 %      LV mass(C)d 216.1 grams      LV mass(C)dI 101.5 grams/m^2      LV mass(C)s 289.9 grams      LV mass(C)sI 136.1 grams/m^2      SV(Teich) 79.1 ml      SI(Teich) 37.1 ml/m^2      SV(cubed) 104.1 ml      SI(cubed) 48.8 ml/m^2      Ao root diam 3.2 cm      Ao root area 8.2 cm^2      ACS 2.0 cm      LVOT diam 2.1 cm      LVOT area 3.5 cm^2      EDV(MOD-sp4) 109.0 ml      ESV(MOD-sp4) 38.3 ml      EF(MOD-sp4) 64.8 %      SV(MOD-sp4) 70.7 ml      SI(MOD-sp4) 33.2 ml/m^2      Ao root area (BSA corrected) 1.5     LV Kirby Vol (BSA corrected) 51.2 ml/m^2      LV Sys Vol (BSA corrected) 18.0 ml/m^2      MV E max kasey 96.7 cm/sec      MV A max kasey 83.3 cm/sec      MV E/A 1.2     MV V2 max 101.4 cm/sec      MV max PG 4.1 mmHg      MV V2 mean 61.5 cm/sec      MV mean PG 1.7 mmHg      MV V2 VTI 31.3 cm      MVA(VTI) 2.4 cm^2      MV dec slope 418.7 cm/sec^2      MV dec time 0.23 sec      Ao pk kasey 136.3 cm/sec      Ao max PG 7.4 mmHg      Ao max PG (full) 2.1 mmHg      Ao V2 mean 97.3 cm/sec      Ao mean PG 4.1 mmHg      Ao mean PG (full) 1.2 mmHg      Ao V2 VTI 26.9 cm      CHRISTIANO(I,A) 2.7 cm^2      CHRISTIANO(I,D) 2.7 cm^2      CHRISTIANO(V,A) 2.9 cm^2      CHRISTIANO(V,D) 2.9 cm^2      LV V1 max PG 5.3 mmHg      LV V1 mean PG 2.9 mmHg      LV V1 max 115.1 cm/sec      LV V1 mean 78.8 cm/sec      LV V1 VTI 21.3 cm      SV(Ao) 221.8 ml      SI(Ao) 104.1 ml/m^2      SV(LVOT) 74.0 ml      SI(LVOT) 34.7 ml/m^2      PA V2 max 118.9 cm/sec      PA max PG 5.7 mmHg      PA max PG (full) 1.1 mmHg      PA V2 mean 86.5 cm/sec      PA mean PG 3.2 mmHg      PA mean PG (full) 1.1 mmHg      PA V2 VTI 29.0 cm      PA acc time 0.14 sec      RV V1 max PG 4.5 mmHg      RV V1 mean PG 2.1 mmHg      RV V1 max 106.6 cm/sec      RV V1 mean 66.4 cm/sec      RV V1 VTI 23.9 cm      TR max kasey 326.3 cm/sec      RVSP(TR) 50.6 mmHg      RAP systole 8.0 mmHg      PA  pr(Accel) 15.2 mmHg       CV ECHO RUSSELL - BZI_BMI 27.1 kilograms/m^2       CV ECHO RUSSELL - BSA(HAYCOCK) 2.2 m^2       CV ECHO RUSSELL - BZI_METRIC_WEIGHT 90.7 kg       CV ECHO RUSSELL - BZI_METRIC_HEIGHT 182.9 cm      Target HR (85%) 133 bpm      Max. Pred. HR (100%) 157 bpm      EF(MOD-bp) 65.0 %      LA dimension(2D) 4.0 cm      Echo EF Estimated 60 %     Narrative:      · Estimated left ventricular EF = 60% Estimated left ventricular EF was in   agreement with the calculated left ventricular EF. Left ventricular   systolic function is normal.  · Estimated right ventricular systolic pressure from tricuspid   regurgitation is moderately elevated (45-55 mmHg).  · Moderate pulmonary hypertension is present.             Results for orders placed during the hospital encounter of 01/01/21   Duplex Venous Lower Extremity - Bilateral CAR    Narrative · Normal bilateral lower extremity venous duplex scan.          Results for orders placed during the hospital encounter of 01/01/21   Adult Transthoracic Echo Complete W/ Cont if Necessary Per Protocol    Narrative · Estimated left ventricular EF = 60% Estimated left ventricular EF was in   agreement with the calculated left ventricular EF. Left ventricular   systolic function is normal.  · Estimated right ventricular systolic pressure from tricuspid   regurgitation is moderately elevated (45-55 mmHg).  · Moderate pulmonary hypertension is present.          Ct Head Without Contrast    Result Date: 1/1/2021   1. Motion artifact which limits the study, especially to the skull base. 2. No large parenchymal hemorrhage. 3. There appears to be volume loss secondary to atrophy.  Electronically Signed By-Erick Bautista MD On:1/1/2021 6:13 PM This report was finalized on [id number] by  Erick Bautista MD.    Ct Chest Without Contrast Diagnostic    Result Date: 1/5/2021  1. Small right and trace left pleural effusion similar to abdominal CT. 2. Anasarca with small abdominal ascites. 3.  Mild splenomegaly. 4. Cholelithiasis and additional incidental chronic findings above.  Electronically Signed By-Tahir Chase MD On:1/5/2021 4:28 PM This report was finalized on [id number] by  Tahir Chase MD.    Ct Cervical Spine Without Contrast    Result Date: 1/1/2021   1. Limited study due to marked motion artifact at the C4-C6 level. It is difficult to exclude an acute fracture or dislocation in this area. 2. Degenerative spondylosis and facet arthritis. 3. Varying degrees of canal and neural foraminal stenosis which was better appreciated on the previous CT exam where no motion artifact was apparent.  Electronically Signed By-Erick Bautista MD On:1/1/2021 6:34 PM This report was finalized on [id number] by  Erick Bautista MD.    Nm Lung Ventilation Perfusion Aerosol    Result Date: 1/6/2021  Compromised evaluation, although no definite evidence to suggest acute pulmonary embolus.  Electronically Signed By-Javed Wiggins MD On:1/6/2021 9:30 AM This report was finalized on [id number] by  Javed Wiggins MD.    Fl Video Swallow With Speech Single Contrast    Result Date: 1/6/2021  Technically successful modified barium swallow examination.  Electronically Signed By-Erick Bautista MD On:1/6/2021 2:39 PM This report was finalized on [id number] by  Erick Bautista MD.    Xr Chest 1 View    Result Date: 1/3/2021   1. No acute cardiopulmonary disease.   Electronically Signed By-Gera Young MD On:1/3/2021 6:28 PM This report was finalized on [id number] by  Gera Young MD.    Xr Chest 1 View    Result Date: 1/1/2021  No active disease.  Electronically Signed By-Erick Bautista MD On:1/1/2021 5:59 PM This report was finalized on [id number] by  Erick Bautista MD.    Ct Abdomen Pelvis Stone Protocol    Result Date: 1/4/2021   1. There is a small volume of scattered ascites, mild body wall edema and a small right-sided pleural effusion, which would indicate an abnormal fluid status. Liver margins are lobulated,  which could indicate cirrhosis. Correlate for the possibility of liver, kidney or heart failure 2. Bilateral perinephric stranding, which is nonspecific. There is mild thickening of the urinary bladder wall. This could be related to the patient's known urosepsis. 3. Mild osseous degenerative changes.  Electronically Signed By-Juanjo Pastor MD On:1/4/2021 2:27 PM This report was finalized on [id number] by  Juanjo Pastor MD.      I personally reviewed patient's x-ray films and my findings are: 0    I personally reviewed patient's EKG strip and my findings are: 0    Medication Review:   I have reviewed the patient's current medication list 01/07/21     Scheduled Meds  amLODIPine, 2.5 mg, Oral, Q24H  atenolol, 100 mg, Oral, Daily  divalproex, 125 mg, Oral, Daily  divalproex, 250 mg, Oral, Daily  divalproex, 500 mg, Oral, Nightly  enoxaparin, 1 mg/kg, Subcutaneous, Q12H  insulin glargine, 18 Units, Subcutaneous, Nightly  insulin lispro, 0-7 Units, Subcutaneous, TID AC  ipratropium-albuterol, 3 mL, Nebulization, 4x Daily - RT  melatonin, 10 mg, Oral, Nightly  meropenem, 500 mg, Intravenous, Q8H  mirtazapine, 7.5 mg, Oral, Nightly  polyethylene glycol, 17 g, Oral, BID  QUEtiapine, 50 mg, Oral, Nightly  sodium chloride, 10 mL, Intravenous, Q12H  tamsulosin, 0.4 mg, Oral, Daily        Meds Infusions  sodium chloride, 50 mL/hr, Last Rate: 50 mL/hr (01/06/21 1914)        DVT prophylaxis  Mechanical Order History:     None      Pharmalogical Order History:      Ordered     Dose Route Frequency Stop    01/01/21 2129  enoxaparin (LOVENOX) syringe 40 mg      40 mg SC Daily --                Meds PRN  •  acetaminophen **OR** acetaminophen **OR** acetaminophen  •  dextrose  •  dextrose  •  glucagon (human recombinant)  •  insulin lispro **AND** insulin lispro  •  ondansetron **OR** ondansetron  •  sodium chloride      Davide Mills MD  01/07/21  12:27 EST

## 2021-01-07 NOTE — PLAN OF CARE
Goal Outcome Evaluation:  Plan of Care Reviewed With: patient  Progress: declining  Outcome Summary: Required mod/max A for safe bed mobility to eob. Unable to bring cog over jose, decreased motor planning limited by confusion. Several attempts to sit eob, pt. unable to remain on task. Returned to supine, low activity tolerance. Will progress as able, will need rehab at d/c to address deficits. PPE worn: Mask, gloves, gown shield.

## 2021-01-07 NOTE — THERAPY TREATMENT NOTE
Acute Care - Speech Language Pathology   Swallow Treatment Note  [facility]     Patient Name: Ronald Reyer  : 1955  MRN: [id number]  Today's Date: 2021               Admit Date: 2021    Visit Dx:     ICD-10-CM ICD-9-CM   1. Fall, initial encounter  W19.XXXA E888.9   2. Weakness  R53.1 780.79   3. Acute kidney injury (CMS/HCC)  N17.9 584.9   4. Urinary tract infection with hematuria, site unspecified  N39.0 599.0    R31.9 599.70     Patient Active Problem List   Diagnosis   • Fall   • SRAVAN (acute kidney injury) (CMS/HCC)   • Stage 3b chronic kidney disease   • UTI (urinary tract infection), bacterial   • Polypharmacy   • E coli bacteremia   • Acute respiratory failure with hypoxia (CMS/HCC)     Past Medical History:   Diagnosis Date   • Antisocial personality disorder (CMS/HCC)    • Bipolar disorder (CMS/HCC)    • Chronic kidney disease    • Convulsions (CMS/[facility])    • Diabetes mellitus (CMS/HCC)    • Hypertension    • Insomnia    • Renal disorder    • Schizophrenia (CMS/[facility])      History reviewed. No pertinent surgical history.     SWALLOW EVALUATION (last 72 hours)      SLP Adult Swallow Evaluation     Row Name 21 1400 21 1600 21 1500             Rehab Evaluation    Document Type  therapy note (daily note)  -EC  evaluation;re-evaluation  -EC  therapy note (daily note)  -      Subjective Information  no complaints  -EC  no complaints  -EC  --      Patient Observations  alert;cooperative;agree to therapy  -EC  alert;cooperative;agree to therapy  -EC  alert;cooperative;agree to therapy  -      Patient/Family/Caregiver Comments/Observations  RN reports pt given ativan earlier due to combative behavior  -EC  --  Pt sitting up in bed with lunch tray in front of him. Noted pt with rapid shallow breathing prior to any PO. RN reports that this has worsened throughout the day. Pt with orders for STAT labs and chest CT - results pending.   -      Care Plan Review  evaluation/treatment results  reviewed  -EC  evaluation/treatment results reviewed;care plan/treatment goals reviewed;patient/other agree to care plan  -EC  --      Patient Effort  good  -EC  good  -EC  good  -MF      Comment  Pt awake, alert and amenable to repositioning and feeding assistance for lunch time meal. Pt fed by clinician.  Mild labial spillage noted intermittently w/NTL by spoon, no anterior spillage noted w/bites of mech soft or puree.  Oral transit and mastication appear WFL. Pt does not appear SOA or fatigued w/PO. No clinical s/s of aspiration demonstrated.  Recommend continue current diet. ST will continue to follow pt for diet tolerance w/further recommendations as indicated.  -EC  Pt seen for re-eval of swallow at bedside on this date w/VFSS performed after re-eval due to s/s of aspiration at bedside w/thin water including wet vocal quality and coughing.  See results of VFSS below.  -EC  --      Symptoms Noted During/After Treatment  none  -EC  --  --         General Information    Patient Profile Reviewed  --  yes  -EC  --      Current Method of Nutrition  --  -- mechanical ground  -EC  --      Plans/Goals Discussed with  --  patient  -EC  --         MBS/VFSS    Utensils Used  --  spoon;straw  -EC  --      Consistencies Trialed  --  mixed consistency;pureed;thin liquids;nectar/syrup-thick liquids;honey-thick liquids mechanical soft  -EC  --         MBS/VFSS Interpretation    VFSS Summary  --  VFSS completed on this date w/trials of thin liquid, NTL, HTL, puree and mechanical soft solids.  Based on results of VFSS, recommend a mechanical soft diet w/NTL, pt should have NTL by spoon only at this time as he is at risk of aspiration w/large cup sips, pt should be sitting upright to 90 degrees for all PO and take small bites of mechanical soft.  ST to continue to follow and will repeat VFSS as indicated.  Detailed results as follows:  Pt demonstrates anterior spillage of all trials during VFSS.  THIN: Pt takes very large sip.  Premature spillage to the valleculae and posterior side of epiglottis w/gerda aspiration during the swallow and continued deep penetration/aspiration of thin on subsequent swallows to clear bolus/residue.  Pt noted w/delayed cough response following aspiration and cough does not clear material.  NTL: Pt given 3 trials by spoon w/spillage ot the valleculae noted and penetration during 1/3 trials.  Mild to moderate vallecular and pyriform residue noted.  HTL Pt administered trials 4 trials by spoon w/spillage to the valleculae noted. No penetration or aspiration w/this consistency. Mild vallecular residue and pyriform sinus residue after the swallow.  PUREE: Disorganized oral transit w/bolus holding and tongue pumping noted.  No penetration or aspiration noted.  Oral residue, mild to moderate vallecular and pyriform residue noted after the swallow.  MECHANICAL SOFT: Spillage to the vlaleuclae noted.  No penetration or aspiration demonstrated.  Moderate pyriform sinus residue noted.    -EC  --         Clinical Impression    Daily Summary of Progress (SLP)  --  --  progress toward functional goals as expected  -      SLP Swallowing Diagnosis  --  --  mild;oral dysphagia;suspected pharyngeal dysphagia  -      Functional Impact  --  --  risk of aspiration/pneumonia  -      Rehab Potential/Prognosis, Swallowing  --  --  good, to achieve stated therapy goals  -      Swallow Criteria for Skilled Therapeutic Interventions Met  --  --  demonstrates skilled criteria  -      Plan for Continued Treatment (SLP)  --  --  Continue mech soft with thin liquids, assist with feeding. Pt should be fully upright and take small bites/sips. Pt may benefit from VFSS to assess for dysphagia and to determine his safest/least restrictive diet d/t overall clinical presentation this date.   -         Recommendations    Therapy Frequency (Swallow)  --  --  PRN  -      Predicted Duration Therapy Intervention (Days)  --  --  until  discharge  -      SLP Diet Recommendation  --  ground;nectar thick liquids  -EC  ground;thin liquids  -      Recommended Diagnostics  --  reassess via VFSS ([facility] [place])  -EC  VFSS ([facility] [place])  -      Recommended Precautions and Strategies  --  upright posture during/after eating;small bites of food and sips of liquid;1:1 supervision;reflux precautions;general aspiration precautions;no straw NTL by spoon  -EC  upright posture during/after eating;small bites of food and sips of liquid;1:1 supervision;reflux precautions;general aspiration precautions  -      Oral Care Recommendations  --  Oral Care before breakfast, after meals and PRN  -EC  Oral Care before breakfast, after meals and PRN  -      SLP Rec. for Method of Medication Administration  --  with pudding or applesauce;as tolerated  -EC  with pudding or applesauce  -      Monitor for Signs of Aspiration  --  yes;notify SLP if any concerns;cough  -EC  yes;notify SLP if any concerns  -         Swallow Goals (SLP)    Oral Nutrition/Hydration Goal Selection (SLP)  oral nutrition/hydration, SLP goal 1;oral nutrition/hydration, SLP goal 2  -EC  oral nutrition/hydration, SLP goal 1;oral nutrition/hydration, SLP goal 2  -EC  oral nutrition/hydration, SLP goal 1;oral nutrition/hydration, SLP goal 2  -MF         Oral Nutrition/Hydration Goal 1 (SLP)    Oral Nutrition/Hydration Goal 1, SLP  Pt will tolerate recommended diet with no s/s aspiration   -EC  Pt will tolerate recommended diet with no s/s aspiration   -EC  Pt will tolerate recommended diet with no s/s aspiration   -      Time Frame (Oral Nutrition/Hydration Goal 1, SLP)  by discharge  -EC  by discharge  -EC  by discharge  -      Barriers (Oral Nutrition/Hydration Goal 1, SLP)  see above note  -EC  Pt participated in VFSS today, see above note  -EC  Pt participated in limited meal assessment this date due to need to urinate. Pt largely appeared to tolerate mech soft and thins based on today's session; however  pt with x1 instance of coughing with large drink of liquids. Pt may benefit from VFSS tomorrow as appropriate.   -MF      Progress/Outcomes (Oral Nutrition/Hydration Goal 1, SLP)  goal ongoing;good progress toward goal  -EC  goal ongoing  -EC  goal ongoing  -MF         Oral Nutrition/Hydration Goal 2 (SLP)    Oral Nutrition/Hydration Goal 2, SLP  Pt will verbalize/demonstrate understanding of safe swallow strategies & techniques with max cues as required.   -EC  Pt will verbalize/demonstrate understanding of safe swallow strategies & techniques with max cues as required.   -EC  Pt will verbalize/demonstrate understanding of safe swallow strategies & techniques with max cues as required.   -MF      Time Frame (Oral Nutrition/Hydration Goal 2, SLP)  2 days;3 days  -EC  2 days;3 days  -EC  2 days;3 days  -MF      Barriers (Oral Nutrition/Hydration Goal 2, SLP)  --  --  Pt seen for meal assessment at lunch time meal. Pt did not attempt to feed himself, so SLP provided all trials. Pt accepted trials of thins via straw, puree, and mech soft. Noted slightly slow mastication of mec soft solids, however clear his oral cavity independently. No residue observed. Pt coughed following 1/5 trials of thin liquids due to pt impulsively taking large consecutive drinks via straw. No further overt s/s of aspiration observed at any time. However, pt noted to have shallow, dyspneic breathing throughout session. Pt to have STAT CT chest this date. Will continue to follow.   -      Progress/Outcomes (Oral Nutrition/Hydration Goal 2, SLP)  goal ongoing  -EC  goal ongoing  -EC  goal ongoing  -MF        User Key  (r) = Recorded By, (t) = Taken By, (c) = Cosigned By    Initials Name Effective Dates    Violet Vega 03/01/19 -     MF Concha Wong SLP 06/17/19 -           EDUCATION  The patient has been educated in the following areas:   Modified Diet Instruction.    SLP Recommendation and Plan   Continue current diet of [facility]  soft/NTL w/NTL by spoon, no straws                                                               SLP GOALS     Row Name 01/07/21 1400 01/06/21 1600 01/05/21 1500       Oral Nutrition/Hydration Goal 1 (SLP)    Oral Nutrition/Hydration Goal 1, SLP  Pt will tolerate recommended diet with no s/s aspiration   -EC  Pt will tolerate recommended diet with no s/s aspiration   -EC  Pt will tolerate recommended diet with no s/s aspiration   -MF    Time Frame (Oral Nutrition/Hydration Goal 1, SLP)  by discharge  -EC  by discharge  -EC  by discharge  -MF    Barriers (Oral Nutrition/Hydration Goal 1, SLP)  see above note  -EC  Pt participated in VFSS today, see above note  -EC  Pt participated in limited meal assessment this date due to need to urinate. Pt largely appeared to tolerate mech soft and thins based on today's session; however pt with x1 instance of coughing with large drink of liquids. Pt may benefit from VFSS tomorrow as appropriate.   -MF    Progress/Outcomes (Oral Nutrition/Hydration Goal 1, SLP)  goal ongoing;good progress toward goal  -EC  goal ongoing  -EC  goal ongoing  -MF       Oral Nutrition/Hydration Goal 2 (SLP)    Oral Nutrition/Hydration Goal 2, SLP  Pt will verbalize/demonstrate understanding of safe swallow strategies & techniques with max cues as required.   -EC  Pt will verbalize/demonstrate understanding of safe swallow strategies & techniques with max cues as required.   -EC  Pt will verbalize/demonstrate understanding of safe swallow strategies & techniques with max cues as required.   -MF    Time Frame (Oral Nutrition/Hydration Goal 2, SLP)  2 days;3 days  -EC  2 days;3 days  -EC  2 days;3 days  -    Barriers (Oral Nutrition/Hydration Goal 2, SLP)  --  --  Pt seen for meal assessment at lunch time meal. Pt did not attempt to feed himself, so SLP provided all trials. Pt accepted trials of thins via straw, puree, and mech soft. Noted slightly slow mastication of mech soft solids, however clear  his oral cavity independently. No residue observed. Pt coughed following 1/5 trials of thin liquids due to pt impulsively taking large consecutive drinks via straw. No further overt s/s of aspiration observed at any time. However, pt noted to have shallow, dyspneic breathing throughout session. Pt to have STAT CT chest this date. Will continue to follow.   -    Progress/Outcomes (Oral Nutrition/Hydration Goal 2, SLP)  goal ongoing  -EC  goal ongoing  -EC  goal ongoing  -      User Key  (r) = Recorded By, (t) = Taken By, (c) = Cosigned By    Initials Name Provider Type    EC Violet Duong Speech and Language Pathologist    Concha Grullon, SLP Speech and Language Pathologist             Time Calculation:       Therapy Charges for Today     Code Description Service Date Service Provider Modifiers Qty    30692319387 HC ST TREATMENT SWALLOW 3 1/6/2021 Violet Duong GN 1    12232397464 HC ST MOTION FLUORO EVAL SWALLOW 6 1/6/2021 Violet Duong GN 1               Violet Duong  1/7/2021

## 2021-01-07 NOTE — PLAN OF CARE
Goal Outcome Evaluation:  Plan of Care Reviewed With: patient  Progress: no change  Outcome Summary: Pt frequently seeking out staff and cursing at staf. Continuing to get IV abxs. Wore BIPAP throughout night. Sleeping in between care. Doppler and V/Q scan from yesterday negative.

## 2021-01-07 NOTE — PLAN OF CARE
Goal Outcome Evaluation:  Plan of Care Reviewed With: patient  Progress: declining     Patient combative today with staff; verbally inappropriate and threatening to hit them.  MD notified and new orders received.  Ativan given with + results.

## 2021-01-07 NOTE — PROGRESS NOTES
"   LOS: 6 days    Patient Care Team:  Provider, No Known as PCP - General  Vincent Cabezas MD as Consulting Physician (Hospitalist)      Subjective     Patient resting comfortably  Drowsy but arousable.  No acute distress overnight      Objective     Vital Sign Min/Max for last 24 hours  Temp:  [97.8 °F (36.6 °C)-100.5 °F (38.1 °C)] 98.9 °F (37.2 °C)  Heart Rate:  [69-80] 76  Resp:  [14-26] 18  BP: (148-177)/(51-82) 177/63                       Flowsheet Rows      First Filed Value   Admission Height  182.9 cm (72\") Documented at 01/01/2021 1711   Admission Weight  90.3 kg (199 lb) Documented at 01/01/2021 1711          I/O this shift:  In: 240 [P.O.:240]  Out: -   I/O last 3 completed shifts:  In: 2601.8 [P.O.:1080; I.V.:1521.8]  Out: -     Physical Exam:  Physical Exam    General.  Drowsy  Head.  Atraumatic, normocephalic  Neck.  Supple.  No JVD  ENT.  Oral mucosa dry  Respiratory.  Few scattered wheezes  Cardiovascular.  Regular rhythm  Abdominal.  Obese, soft, nontender  Extremities.  No edema     LABS:  Lab Results   Component Value Date    CALCIUM 8.2 (L) 01/07/2021    PHOS 3.8 01/07/2021     Results from last 7 days   Lab Units 01/07/21  0216 01/06/21  0259 01/05/21  0351  01/04/21  0324 01/03/21  1752  01/01/21  1733   MAGNESIUM mg/dL  --  2.1 2.0  --   --  1.9  --   --    SODIUM mmol/L 140 136 132*  --  133* 132*  --  133*   POTASSIUM mmol/L 5.0 4.7 4.5  --  5.1 5.4*   < > 4.7   CHLORIDE mmol/L 111* 107 101  --  103 99   < > 99   CO2 mmol/L 21.0* 21.0* 21.0*  --  20.0* 24.0   < > 24.0   BUN mg/dL 72* 72* 75*  --  69* 66*   < > 35*   CREATININE mg/dL 2.46* 2.63* 2.96*  --  3.41* 3.44*   < > 2.24*   GLUCOSE mg/dL 110* 95 93  --  79 107*   < > 115*   CALCIUM mg/dL 8.2* 8.4* 8.3*  --  8.7 8.7   < > 8.9   WBC 10*3/mm3 10.00 14.40* 17.30*   < >  --  18.50*   < > 15.30*   HEMOGLOBIN g/dL 8.5* 9.0* 9.0*   < >  --  10.7*   < > 10.4*   HEMOGLOBIN, POC   --   --   --   --   --   --    < >  --    PLATELETS 10*3/mm3 142 " 142 115*   < >  --  126*   < > 162   ALT (SGPT) U/L  --   --   --   --  20 22  --  12   AST (SGOT) U/L  --   --   --   --  34 37  --  22    < > = values in this interval not displayed.     Lab Results   Component Value Date    CKTOTAL 92 01/01/2021    TROPONINT <0.010 01/04/2021     Estimated Creatinine Clearance: 39.3 mL/min (A) (by C-G formula based on SCr of 2.46 mg/dL (H)).  Results from last 7 days   Lab Units 01/06/21  0409   PH, ARTERIAL pH units 7.393   PO2 ART mm Hg 63.4*   PCO2, ARTERIAL mm Hg 33.1*   HCO3 ART mmol/L 20.2*     Brief Urine Lab Results  (Last result in the past 365 days)      Color   Clarity   Blood   Leuk Est   Nitrite   Protein   CREAT   Urine HCG        01/01/21 1852 Yellow Turbid  Comment:  Result checked  Large (3+) Moderate (2+) Positive >=300 mg/dL (3+)             WEIGHTS:     Wt Readings from Last 1 Encounters:   01/07/21 0441 90.5 kg (199 lb 8.3 oz)   01/06/21 1353 89.8 kg (198 lb)   01/06/21 1352 89.8 kg (198 lb)   01/06/21 0600 90.2 kg (198 lb 13.7 oz)   01/05/21 0500 91 kg (200 lb 9.9 oz)   01/04/21 0500 88.8 kg (195 lb 12.3 oz)   01/03/21 2346 88.8 kg (195 lb 12.3 oz)   01/01/21 2230 86.1 kg (189 lb 13.1 oz)   01/01/21 1711 90.3 kg (199 lb)       atenolol, 100 mg, Oral, Daily  divalproex, 125 mg, Oral, Daily  divalproex, 250 mg, Oral, Daily  divalproex, 500 mg, Oral, Nightly  enoxaparin, 1 mg/kg, Subcutaneous, Q12H  insulin glargine, 18 Units, Subcutaneous, Nightly  insulin lispro, 0-7 Units, Subcutaneous, TID AC  ipratropium-albuterol, 3 mL, Nebulization, 4x Daily - RT  melatonin, 10 mg, Oral, Nightly  meropenem, 500 mg, Intravenous, Q8H  mirtazapine, 7.5 mg, Oral, Nightly  polyethylene glycol, 17 g, Oral, BID  QUEtiapine, 50 mg, Oral, Nightly  sodium chloride, 10 mL, Intravenous, Q12H  tamsulosin, 0.4 mg, Oral, Daily      sodium chloride, 50 mL/hr, Last Rate: 50 mL/hr (01/06/21 1914)        Assessment/Plan       1.  Acute kidney injury  Nonoliguric.  Renal function improving  slowly  Continue IV hydration  Electrolytes okay.  Normal LVEF per echocardiogram    2.  Hypertension  Blood pressure running high.  I will add amlodipine    3.  E. coli UTI with bacteremia  On IV meropenem  Possibly related to urinary retention.  Mild bladder wall thickening noticed on CT scan but no hydronephrosis  On Flomax.    Last PVR okay    4.  Hyponatremia  Improved with IV hydration    Hermilo Wilson MD  01/07/21  11:11 EST

## 2021-01-08 VITALS
RESPIRATION RATE: 24 BRPM | BODY MASS INDEX: 27.17 KG/M2 | OXYGEN SATURATION: 96 % | TEMPERATURE: 99 F | SYSTOLIC BLOOD PRESSURE: 154 MMHG | WEIGHT: 200.62 LBS | HEART RATE: 79 BPM | DIASTOLIC BLOOD PRESSURE: 69 MMHG | HEIGHT: 72 IN

## 2021-01-08 PROBLEM — A41.50 SEPSIS DUE TO GRAM NEGATIVE BACTERIA (HCC): Status: RESOLVED | Noted: 2021-01-08 | Resolved: 2021-01-08

## 2021-01-08 PROBLEM — A41.50 SEPSIS DUE TO GRAM NEGATIVE BACTERIA (HCC): Status: ACTIVE | Noted: 2021-01-08

## 2021-01-08 LAB
ALBUMIN SERPL-MCNC: 1.8 G/DL (ref 3.5–5.2)
ANION GAP SERPL CALCULATED.3IONS-SCNC: 6 MMOL/L (ref 5–15)
BUN SERPL-MCNC: 61 MG/DL (ref 8–23)
BUN/CREAT SERPL: 30.5 (ref 7–25)
CALCIUM SPEC-SCNC: 8.3 MG/DL (ref 8.6–10.5)
CHLORIDE SERPL-SCNC: 115 MMOL/L (ref 98–107)
CO2 SERPL-SCNC: 22 MMOL/L (ref 22–29)
CREAT SERPL-MCNC: 2 MG/DL (ref 0.76–1.27)
DEPRECATED RDW RBC AUTO: 52.5 FL (ref 37–54)
ERYTHROCYTE [DISTWIDTH] IN BLOOD BY AUTOMATED COUNT: 16.7 % (ref 12.3–15.4)
GFR SERPL CREATININE-BSD FRML MDRD: 34 ML/MIN/1.73
GLUCOSE BLDC GLUCOMTR-MCNC: 116 MG/DL (ref 70–105)
GLUCOSE BLDC GLUCOMTR-MCNC: 164 MG/DL (ref 70–105)
GLUCOSE BLDC GLUCOMTR-MCNC: 76 MG/DL (ref 70–105)
GLUCOSE SERPL-MCNC: 86 MG/DL (ref 65–99)
HCT VFR BLD AUTO: 23.8 % (ref 37.5–51)
HGB BLD-MCNC: 8 G/DL (ref 13–17.7)
MCH RBC QN AUTO: 29.9 PG (ref 26.6–33)
MCHC RBC AUTO-ENTMCNC: 33.5 G/DL (ref 31.5–35.7)
MCV RBC AUTO: 89.3 FL (ref 79–97)
PHOSPHATE SERPL-MCNC: 3.8 MG/DL (ref 2.5–4.5)
PLATELET # BLD AUTO: 211 10*3/MM3 (ref 140–450)
PMV BLD AUTO: 7.5 FL (ref 6–12)
POTASSIUM SERPL-SCNC: 5.1 MMOL/L (ref 3.5–5.2)
RBC # BLD AUTO: 2.67 10*6/MM3 (ref 4.14–5.8)
SODIUM SERPL-SCNC: 143 MMOL/L (ref 136–145)
WBC # BLD AUTO: 12.7 10*3/MM3 (ref 3.4–10.8)

## 2021-01-08 PROCEDURE — 97530 THERAPEUTIC ACTIVITIES: CPT

## 2021-01-08 PROCEDURE — 63710000001 INSULIN GLARGINE PER 5 UNITS: Performed by: NURSE PRACTITIONER

## 2021-01-08 PROCEDURE — 99239 HOSP IP/OBS DSCHRG MGMT >30: CPT | Performed by: INTERNAL MEDICINE

## 2021-01-08 PROCEDURE — 82962 GLUCOSE BLOOD TEST: CPT

## 2021-01-08 PROCEDURE — 85027 COMPLETE CBC AUTOMATED: CPT | Performed by: INTERNAL MEDICINE

## 2021-01-08 PROCEDURE — 94660 CPAP INITIATION&MGMT: CPT

## 2021-01-08 PROCEDURE — 94799 UNLISTED PULMONARY SVC/PX: CPT

## 2021-01-08 PROCEDURE — 25010000002 MEROPENEM PER 100 MG: Performed by: INTERNAL MEDICINE

## 2021-01-08 PROCEDURE — C1751 CATH, INF, PER/CENT/MIDLINE: HCPCS

## 2021-01-08 PROCEDURE — 25010000002 ENOXAPARIN PER 10 MG: Performed by: INTERNAL MEDICINE

## 2021-01-08 PROCEDURE — 36410 VNPNXR 3YR/> PHY/QHP DX/THER: CPT

## 2021-01-08 PROCEDURE — 80069 RENAL FUNCTION PANEL: CPT | Performed by: INTERNAL MEDICINE

## 2021-01-08 RX ORDER — SODIUM CHLORIDE 0.9 % (FLUSH) 0.9 %
10 SYRINGE (ML) INJECTION EVERY 12 HOURS SCHEDULED
Status: DISCONTINUED | OUTPATIENT
Start: 2021-01-08 | End: 2021-01-09 | Stop reason: HOSPADM

## 2021-01-08 RX ORDER — SODIUM CHLORIDE 0.9 % (FLUSH) 0.9 %
20 SYRINGE (ML) INJECTION AS NEEDED
Status: CANCELLED | OUTPATIENT
Start: 2021-01-08

## 2021-01-08 RX ORDER — SODIUM CHLORIDE 0.9 % (FLUSH) 0.9 %
10 SYRINGE (ML) INJECTION AS NEEDED
Status: CANCELLED | OUTPATIENT
Start: 2021-01-08

## 2021-01-08 RX ORDER — SODIUM CHLORIDE 0.9 % (FLUSH) 0.9 %
20 SYRINGE (ML) INJECTION AS NEEDED
Status: DISCONTINUED | OUTPATIENT
Start: 2021-01-08 | End: 2021-01-09 | Stop reason: HOSPADM

## 2021-01-08 RX ORDER — SODIUM CHLORIDE 0.9 % (FLUSH) 0.9 %
10 SYRINGE (ML) INJECTION AS NEEDED
Status: DISCONTINUED | OUTPATIENT
Start: 2021-01-08 | End: 2021-01-09 | Stop reason: HOSPADM

## 2021-01-08 RX ORDER — SODIUM CHLORIDE 0.9 % (FLUSH) 0.9 %
10 SYRINGE (ML) INJECTION EVERY 12 HOURS SCHEDULED
Status: CANCELLED | OUTPATIENT
Start: 2021-01-08

## 2021-01-08 RX ORDER — QUETIAPINE FUMARATE 50 MG/1
50 TABLET, FILM COATED ORAL NIGHTLY
Start: 2021-01-08

## 2021-01-08 RX ORDER — AMLODIPINE BESYLATE 5 MG/1
5 TABLET ORAL
Status: DISCONTINUED | OUTPATIENT
Start: 2021-01-08 | End: 2021-01-09 | Stop reason: HOSPADM

## 2021-01-08 RX ORDER — AMLODIPINE BESYLATE 5 MG/1
5 TABLET ORAL
Start: 2021-01-09

## 2021-01-08 RX ADMIN — ENOXAPARIN SODIUM 90 MG: 100 INJECTION SUBCUTANEOUS at 01:18

## 2021-01-08 RX ADMIN — Medication 10 ML: at 08:51

## 2021-01-08 RX ADMIN — Medication 10 ML: at 20:38

## 2021-01-08 RX ADMIN — DIVALPROEX SODIUM 125 MG: 125 TABLET, DELAYED RELEASE ORAL at 13:57

## 2021-01-08 RX ADMIN — IPRATROPIUM BROMIDE AND ALBUTEROL SULFATE 3 ML: 2.5; .5 SOLUTION RESPIRATORY (INHALATION) at 16:12

## 2021-01-08 RX ADMIN — TAMSULOSIN HYDROCHLORIDE 0.4 MG: 0.4 CAPSULE ORAL at 08:49

## 2021-01-08 RX ADMIN — INSULIN GLARGINE 18 UNITS: 100 INJECTION, SOLUTION SUBCUTANEOUS at 20:39

## 2021-01-08 RX ADMIN — IPRATROPIUM BROMIDE AND ALBUTEROL SULFATE 3 ML: 2.5; .5 SOLUTION RESPIRATORY (INHALATION) at 12:49

## 2021-01-08 RX ADMIN — IPRATROPIUM BROMIDE AND ALBUTEROL SULFATE 3 ML: 2.5; .5 SOLUTION RESPIRATORY (INHALATION) at 21:03

## 2021-01-08 RX ADMIN — ACETAMINOPHEN 650 MG: 325 TABLET, FILM COATED ORAL at 01:07

## 2021-01-08 RX ADMIN — MEROPENEM 500 MG: 500 INJECTION, POWDER, FOR SOLUTION INTRAVENOUS at 04:49

## 2021-01-08 RX ADMIN — AMLODIPINE BESYLATE 5 MG: 5 TABLET ORAL at 09:01

## 2021-01-08 RX ADMIN — MIRTAZAPINE 7.5 MG: 7.5 TABLET ORAL at 20:36

## 2021-01-08 RX ADMIN — MEROPENEM 500 MG: 500 INJECTION, POWDER, FOR SOLUTION INTRAVENOUS at 20:34

## 2021-01-08 RX ADMIN — ENOXAPARIN SODIUM 40 MG: 40 INJECTION SUBCUTANEOUS at 18:00

## 2021-01-08 RX ADMIN — DIVALPROEX SODIUM 500 MG: 500 TABLET, DELAYED RELEASE ORAL at 20:34

## 2021-01-08 RX ADMIN — QUETIAPINE FUMARATE 50 MG: 25 TABLET ORAL at 20:36

## 2021-01-08 RX ADMIN — IPRATROPIUM BROMIDE AND ALBUTEROL SULFATE 3 ML: 2.5; .5 SOLUTION RESPIRATORY (INHALATION) at 07:38

## 2021-01-08 RX ADMIN — ATENOLOL 100 MG: 50 TABLET ORAL at 04:49

## 2021-01-08 RX ADMIN — Medication 10 MG: at 20:31

## 2021-01-08 RX ADMIN — MEROPENEM 500 MG: 500 INJECTION, POWDER, FOR SOLUTION INTRAVENOUS at 13:14

## 2021-01-08 RX ADMIN — DIVALPROEX SODIUM 250 MG: 250 TABLET, DELAYED RELEASE ORAL at 08:49

## 2021-01-08 RX ADMIN — Medication 10 ML: at 20:35

## 2021-01-08 NOTE — PLAN OF CARE
Goal Outcome Evaluation:  Plan of Care Reviewed With: patient  Progress: no change  Outcome Summary: Pt tolerated treatment well this date. Was pleasant with PT and willing to participate. Pt requires MOD/MAX A x2 to come to sitting EOB. Pt remained sitting with CGA/MIN A x1 while PT assisted with AAROM of BLE and BUE. Pt tolerated movement well with occasion c/o of stiffness. After approx 10 min of sitting EOB pt was returned to supine. Pt requested water and is on NTL, CNA reports no thickener on unit and has to call dietary. Recommendation remains IP rehab following d/c. PPE worn includes gloves and mask with goggles.

## 2021-01-08 NOTE — PROGRESS NOTES
"   LOS: 7 days    Patient Care Team:  Provider, No Known as PCP - General  Vincent Cabezas MD as Consulting Physician (Hospitalist)      Subjective     Patient feeling fine.  Denies any new complaint.  No acute distress overnight      Objective     Vital Sign Min/Max for last 24 hours  Temp:  [98.3 °F (36.8 °C)-100.2 °F (37.9 °C)] 99 °F (37.2 °C)  Heart Rate:  [73-84] 79  Resp:  [14-20] 20  BP: (141-185)/(63-94) 174/83                       Flowsheet Rows      First Filed Value   Admission Height  182.9 cm (72\") Documented at 01/01/2021 1711   Admission Weight  90.3 kg (199 lb) Documented at 01/01/2021 1711          No intake/output data recorded.  I/O last 3 completed shifts:  In: 4129 [P.O.:960; I.V.:3069; IV Piggyback:100]  Out: -     Physical Exam:  Physical Exam    General.  Awake and alert  Head.  Atraumatic, normocephalic  Neck.  Supple.  No JVD  ENT.  Oral mucosa dry  Respiratory.  Few scattered wheezes  Cardiovascular.  Regular rhythm  Abdominal.  Obese, soft, nontender  Extremities.  No edema     LABS:  Lab Results   Component Value Date    CALCIUM 8.3 (L) 01/08/2021    PHOS 3.8 01/08/2021     Results from last 7 days   Lab Units 01/08/21  0423 01/07/21  0216 01/06/21  0259 01/05/21  0351  01/04/21  0324 01/03/21  1752  01/01/21  1733   MAGNESIUM mg/dL  --   --  2.1 2.0  --   --  1.9  --   --    SODIUM mmol/L 143 140 136 132*  --  133* 132*  --  133*   POTASSIUM mmol/L 5.1 5.0 4.7 4.5  --  5.1 5.4*   < > 4.7   CHLORIDE mmol/L 115* 111* 107 101  --  103 99   < > 99   CO2 mmol/L 22.0 21.0* 21.0* 21.0*  --  20.0* 24.0   < > 24.0   BUN mg/dL 61* 72* 72* 75*  --  69* 66*   < > 35*   CREATININE mg/dL 2.00* 2.46* 2.63* 2.96*  --  3.41* 3.44*   < > 2.24*   GLUCOSE mg/dL 86 110* 95 93  --  79 107*   < > 115*   CALCIUM mg/dL 8.3* 8.2* 8.4* 8.3*  --  8.7 8.7   < > 8.9   WBC 10*3/mm3 12.70* 10.00 14.40* 17.30*   < >  --  18.50*   < > 15.30*   HEMOGLOBIN g/dL 8.0* 8.5* 9.0* 9.0*   < >  --  10.7*   < > 10.4*   "   HEMOGLOBIN, POC   --   --   --   --   --   --   --    < >  --    PLATELETS 10*3/mm3 211 142 142 115*   < >  --  126*   < > 162   ALT (SGPT) U/L  --   --   --   --   --  20 22  --  12   AST (SGOT) U/L  --   --   --   --   --  34 37  --  22    < > = values in this interval not displayed.     Lab Results   Component Value Date    CKTOTAL 92 01/01/2021    TROPONINT <0.010 01/04/2021     Estimated Creatinine Clearance: 48.7 mL/min (A) (by C-G formula based on SCr of 2 mg/dL (H)).  Results from last 7 days   Lab Units 01/06/21  0409   PH, ARTERIAL pH units 7.393   PO2 ART mm Hg 63.4*   PCO2, ARTERIAL mm Hg 33.1*   HCO3 ART mmol/L 20.2*     Brief Urine Lab Results  (Last result in the past 365 days)      Color   Clarity   Blood   Leuk Est   Nitrite   Protein   CREAT   Urine HCG        01/01/21 1852 Yellow Turbid  Comment:  Result checked  Large (3+) Moderate (2+) Positive >=300 mg/dL (3+)             WEIGHTS:     Wt Readings from Last 1 Encounters:   01/08/21 0500 91 kg (200 lb 9.9 oz)   01/07/21 0441 90.5 kg (199 lb 8.3 oz)   01/06/21 1353 89.8 kg (198 lb)   01/06/21 1352 89.8 kg (198 lb)   01/06/21 0600 90.2 kg (198 lb 13.7 oz)   01/05/21 0500 91 kg (200 lb 9.9 oz)   01/04/21 0500 88.8 kg (195 lb 12.3 oz)   01/03/21 2346 88.8 kg (195 lb 12.3 oz)   01/01/21 2230 86.1 kg (189 lb 13.1 oz)   01/01/21 1711 90.3 kg (199 lb)       amLODIPine, 2.5 mg, Oral, Q24H  atenolol, 100 mg, Oral, Daily  divalproex, 125 mg, Oral, Daily  divalproex, 250 mg, Oral, Daily  divalproex, 500 mg, Oral, Nightly  enoxaparin, 1 mg/kg, Subcutaneous, Q12H  insulin glargine, 18 Units, Subcutaneous, Nightly  insulin lispro, 0-7 Units, Subcutaneous, TID AC  ipratropium-albuterol, 3 mL, Nebulization, 4x Daily - RT  melatonin, 10 mg, Oral, Nightly  meropenem, 500 mg, Intravenous, Q8H  mirtazapine, 7.5 mg, Oral, Nightly  polyethylene glycol, 17 g, Oral, BID  QUEtiapine, 50 mg, Oral, Nightly  sodium chloride, 10 mL, Intravenous, Q12H  tamsulosin, 0.4 mg,  Oral, Daily      sodium chloride, 75 mL/hr, Last Rate: 75 mL/hr (01/07/21 9155)        Assessment/Plan       1.  Acute kidney injury  Nonoliguric.  Renal function improving slowly  Electrolytes okay.  Continue IV hydration    2.  Hypertension  Pressure still running high.  Increase amlodipine dose    3.  E. coli UTI with bacteremia  On IV meropenem  Possibly related to urinary retention.  Mild bladder wall thickening noticed on CT scan but no hydronephrosis  On Flomax.    Last PVR okay    4.  Hyponatremia  Improved with IV hydration    Hermilo Wilson MD  01/08/21  08:48 EST

## 2021-01-08 NOTE — PLAN OF CARE
Goal Outcome Evaluation:  Plan of Care Reviewed With: patient  Progress: no change  Outcome Summary: Pt BP elevated throughout night. NP notified. Will continue to monitor. IV fluids increased per nephrology. Sleeping in between care. Refused BIPAP overnight. Will continue to monitor.

## 2021-01-08 NOTE — PROGRESS NOTES
"PULMONARY CRITICAL CARE Progress  NOTE      PATIENT IDENTIFICATION:  Name: Reyer, Ronald  MRN: [id number]  :  1955     Age: 65 y.o.  Sex: male    DATE OF Note:  2021   Referring Physician: Daivde Mills MD                  Subjective:   Feeling better on RA   No nausea or vomiting, no change in bowel habit, no dysuria,  no new  skin rash or itching.      Objective:  tMax 24 hrs: Temp (24hrs), Av.2 °F (37.3 °C), Min:98.3 °F (36.8 °C), Max:100.5 °F (38.1 °C)      Vitals Ranges:   Temp:  [98.3 °F (36.8 °C)-100.5 °F (38.1 °C)] 98.3 °F (36.8 °C)  Heart Rate:  [69-80] 80  Resp:  [14-26] 18  BP: (141-177)/(51-75) 154/75    Intake and Output Last 3 Shifts:   I/O last 3 completed shifts:  In: 3454 [P.O.:1320; I.V.:; IV Piggyback:100]  Out: -     Exam:  /75   Pulse 80   Temp 98.3 °F (36.8 °C)   Resp 18   Ht 182.9 cm (72\")   Wt 90.5 kg (199 lb 8.3 oz)   SpO2 97%   BMI 27.06 kg/m²     General Appearance:   Looks fatigue tachypneic  HEENT:  Normocephalic, without obvious abnormality, Conjunctiva/corneas clear,.  Normal external ear canals, Nares normal, no drainage     Neck:  Supple, symmetrical, trachea midline. No JVD.  Lungs /Chest wall:   Bilateral basal rhonchi, respirations unlabored symmetrical wall movement.     Heart:  Regular rate and rhythm, systolic murmur PMI left sternal border  Abdomen: Soft, non-tender, no masses, no organomegaly.    Extremities: Trace edema no clubbing or Cyanosis        Medications:    Current Facility-Administered Medications:   •  acetaminophen (TYLENOL) tablet 650 mg, 650 mg, Oral, Q4H PRN, 650 mg at 21 0953 **OR** acetaminophen (TYLENOL) 160 MG/5ML solution 650 mg, 650 mg, Oral, Q4H PRN **OR** acetaminophen (TYLENOL) suppository 650 mg, 650 mg, Rectal, Q4H PRN, Anita Farnsworth FNP, 650 mg at 21 1905  •  amLODIPine (NORVASC) tablet 2.5 mg, 2.5 mg, Oral, Q24H, Hermilo Wilson MD, 2.5 mg at 21 1311  •  atenolol (TENORMIN) tablet 100 mg, 100 mg, Oral, " Daily, Davide Mills MD, 100 mg at 01/07/21 0930  •  dextrose (D50W) 25 g/ 50mL Intravenous Solution 25 g, 25 g, Intravenous, Q15 Min PRN, Anita Farnsworth FNP  •  dextrose (GLUTOSE) oral gel 15 g, 15 g, Oral, Q15 Min PRN, Anita Farnsworth FNP  •  divalproex (DEPAKOTE) DR tablet 125 mg, 125 mg, Oral, Daily, Anita Farnsworth FNP, 125 mg at 01/07/21 1311  •  divalproex (DEPAKOTE) DR tablet 250 mg, 250 mg, Oral, Daily, Anita Farnsworth FNP, 250 mg at 01/07/21 0930  •  divalproex (DEPAKOTE) DR tablet 500 mg, 500 mg, Oral, Nightly, Anita Farnsworth FNP, 500 mg at 01/06/21 2032  •  enoxaparin (LOVENOX) syringe 90 mg, 1 mg/kg, Subcutaneous, Q12H, Davide Mills MD, 90 mg at 01/07/21 1811  •  glucagon (human recombinant) (GLUCAGEN DIAGNOSTIC) injection 1 mg, 1 mg, Subcutaneous, Q15 Min PRN, Anita Farnsworth FNP  •  haloperidol lactate (HALDOL) injection 1 mg, 1 mg, Intravenous, Q6H PRN, Davide Mills MD  •  insulin glargine (LANTUS, SEMGLEE) injection 18 Units, 18 Units, Subcutaneous, Nightly, Anita Farnsworth FNP, 18 Units at 01/06/21 2032  •  insulin lispro (humaLOG, ADMELOG) injection 0-7 Units, 0-7 Units, Subcutaneous, TID AC, 2 Units at 01/07/21 1811 **AND** insulin lispro (humaLOG, ADMELOG) injection 0-7 Units, 0-7 Units, Subcutaneous, PRN, Anita Farnsworth FNP  •  ipratropium-albuterol (DUO-NEB) nebulizer solution 3 mL, 3 mL, Nebulization, 4x Daily - RT, Davide Mills MD, 3 mL at 01/07/21 1623  •  LORazepam (ATIVAN) injection 0.5 mg, 0.5 mg, Intravenous, Q4H PRN, Davide Mills MD, 0.5 mg at 01/07/21 1314  •  melatonin tablet 10 mg, 10 mg, Oral, Nightly, Anita Farnsworth FNP, 10 mg at 01/06/21 2032  •  meropenem (MERREM) 500 mg in sodium chloride 0.9 % 100 mL IVPB, 500 mg, Intravenous, Q8H, Davide Mills MD, 500 mg at 01/07/21 1313  •  mirtazapine (REMERON) tablet 7.5 mg, 7.5 mg, Oral, Nightly, Anita Farnsworth FNP, 7.5 mg at 01/06/21 2031  •  ondansetron (ZOFRAN) tablet 4 mg, 4 mg, Oral, Q6H PRN **OR** ondansetron (ZOFRAN) injection 4 mg, 4 mg, Intravenous, Q6H PRN,  Anita Farnsworth FNP  •  polyethylene glycol (MIRALAX) packet 17 g, 17 g, Oral, BID, Davide Mills MD, 17 g at 01/07/21 0930  •  QUEtiapine (SEROquel) tablet 50 mg, 50 mg, Oral, Nightly, Davide Mills MD, 50 mg at 01/06/21 2032  •  sodium chloride 0.9 % flush 10 mL, 10 mL, Intravenous, Q12H, Anita Farnsworth FNP, 10 mL at 01/07/21 0930  •  sodium chloride 0.9 % flush 10 mL, 10 mL, Intravenous, PRN, Anita Farnsworth FNP  •  sodium chloride 0.9 % infusion, 75 mL/hr, Intravenous, Continuous, Hermilo Wilson MD, Last Rate: 50 mL/hr at 01/07/21 1312, 50 mL/hr at 01/07/21 1312  •  tamsulosin (FLOMAX) 24 hr capsule 0.4 mg, 0.4 mg, Oral, Daily, Hermilo Wilson MD, 0.4 mg at 01/07/21 0930    Data Review:  All labs (24hrs):   Recent Results (from the past 24 hour(s))   POC Glucose Once    Collection Time: 01/06/21  8:42 PM    Specimen: Blood   Result Value Ref Range    Glucose 141 (H) 70 - 105 mg/dL   Renal Function Panel    Collection Time: 01/07/21  2:16 AM    Specimen: Blood   Result Value Ref Range    Glucose 110 (H) 65 - 99 mg/dL    BUN 72 (H) 8 - 23 mg/dL    Creatinine 2.46 (H) 0.76 - 1.27 mg/dL    Sodium 140 136 - 145 mmol/L    Potassium 5.0 3.5 - 5.2 mmol/L    Chloride 111 (H) 98 - 107 mmol/L    CO2 21.0 (L) 22.0 - 29.0 mmol/L    Calcium 8.2 (L) 8.6 - 10.5 mg/dL    Albumin 1.60 (L) 3.50 - 5.20 g/dL    Phosphorus 3.8 2.5 - 4.5 mg/dL    Anion Gap 8.0 5.0 - 15.0 mmol/L    BUN/Creatinine Ratio 29.3 (H) 7.0 - 25.0    eGFR Non African Amer 27 (L) >60 mL/min/1.73   CBC (No Diff)    Collection Time: 01/07/21  2:16 AM    Specimen: Blood   Result Value Ref Range    WBC 10.00 3.40 - 10.80 10*3/mm3    RBC 2.88 (L) 4.14 - 5.80 10*6/mm3    Hemoglobin 8.5 (L) 13.0 - 17.7 g/dL    Hematocrit 25.9 (L) 37.5 - 51.0 %    MCV 89.8 79.0 - 97.0 fL    MCH 29.5 26.6 - 33.0 pg    MCHC 32.9 31.5 - 35.7 g/dL    RDW 17.4 (H) 12.3 - 15.4 %    RDW-SD 55.1 (H) 37.0 - 54.0 fl    MPV 7.4 6.0 - 12.0 fL    Platelets 142 140 - 450 10*3/mm3   POC Glucose Once    Collection Time:  01/07/21  7:14 AM    Specimen: Blood   Result Value Ref Range    Glucose 80 70 - 105 mg/dL   POC Glucose Once    Collection Time: 01/07/21 11:21 AM    Specimen: Blood   Result Value Ref Range    Glucose 130 (H) 70 - 105 mg/dL   POC Glucose Once    Collection Time: 01/07/21  4:32 PM    Specimen: Blood   Result Value Ref Range    Glucose 151 (H) 70 - 105 mg/dL        Imaging:  Adult Transthoracic Echo Complete W/ Cont if Necessary Per Protocol  · Estimated left ventricular EF = 60% Estimated left ventricular EF was in   agreement with the calculated left ventricular EF. Left ventricular   systolic function is normal.  · Estimated right ventricular systolic pressure from tricuspid   regurgitation is moderately elevated (45-55 mmHg).  · Moderate pulmonary hypertension is present.     FL Video Swallow With Speech Single Contrast  Narrative: DATE OF EXAM:  1/6/2021 12:30 PM     PROCEDURE:  FL VIDEO SWALLOW W SPEECH SINGLE-CONTRAST-     INDICATIONS:  Dysphagia.     COMPARISON:  No Comparisons Available     TECHNIQUE:   This examination was performed in conjunction with speech pathology.  Lateral video fluoroscopic evaluation of the swallowing mechanism was  performed while correlate administering to the patient various  consistency food items mixed with barium.     Fluoroscopic Time:   3.3 minutes     FINDINGS:  Please see the speech pathologist's report with regard to detailed  findings from the procedure and feeding recommendations.      Impression: Technically successful modified barium swallow examination.     Electronically Signed By-Erick Bautista MD On:1/6/2021 2:39 PM  This report was finalized on [id number] by  Erick Bautista MD.  Duplex Venous Lower Extremity - Bilateral CAR  · Normal bilateral lower extremity venous duplex scan.     NM Lung Ventilation Perfusion Aerosol  Narrative: DATE OF EXAM:  1/6/2021 9:16 AM     PROCEDURE:  NM LUNG VENTILATION PERFUSION AEROSOL-     INDICATIONS:  Chest pain, acute, PE  suspected, intermed prob, negative D-dimer;  W19.XXXA-Unspecified fall, initial encounter; R53.1-Weakness;  N17.9-Acute kidney failure, unspecified; N39.0-Urinary tract infection,  site not specified; R31.9-Hematuria, unspecified     COMPARISON:  CT chest from 01/05/2021     TECHNIQUE:   The patient was ventilated with 49.8 mCi of technetium 99m pertechnetate  aerosol and ventilation images were acquired in multiple obliquities.  The patient then received 5.6 mCi of technetium 99m MAA intravenously  and perfusion images were acquired in multiple obliquities.     FINDINGS:  Examination is compromised due to patient not being cooperative with the  examination. Only anterior, posterior, and lateral images were obtained.  No definite wedge-shaped V/Q mismatches are identified to suggest  pulmonary embolus.     Impression: Compromised evaluation, although no definite evidence to suggest acute  pulmonary embolus.     Electronically Signed By-Javed Wiggins MD On:1/6/2021 9:30 AM  This report was finalized on [id number] by  Javed Wiggins MD.       ASSESSMENT:    Fall    SRAVAN (acute kidney injury) (CMS/[facility])    Stage 3b chronic kidney disease    UTI (urinary tract infection), bacterial    Polypharmacy    E coli bacteremia    Acute respiratory failure with hypoxia (CMS/HCC)     pulm HTN    PLAN:  Continue    IS/ Flutter valve   Continue the current antibiotics  Bronchodilator  Inhaled corticosteroids  Electrolytes/ glycemic control  DVT and GI prophylaxis.    Total Critical care time in direct medical management (   ) minutes  Chetna Haines MD. D, ABSM.     1/7/2021  20:00 EST

## 2021-01-08 NOTE — NURSING NOTE
Called report to [facility]. I spoke with Emely. Patient is from this facility so he is known to them. Patient will go to room 23. Still waiting on midline placement before calling EMS.

## 2021-01-08 NOTE — DISCHARGE SUMMARY
Date of Admission: 1/1/2021 2116/1    Date of Discharge:  1/8/2021    Length of stay:  LOS: 7 days     Patient was examined with relevant and adequate PPE keeping in mind the current coronavirus pandemic. Minimum of 10 minutes to don and doff PPE.      Presenting Problem/History of Present Illness   Present on Admission:  • Fall  • SRAVAN (acute kidney injury) (CMS/Formerly [facility])  • Stage 3b chronic kidney disease  • UTI (urinary tract infection), bacterial  • E coli bacteremia  • Acute respiratory failure with hypoxia (CMS/Formerly [facility])  • (Resolved) Sepsis due to Gram negative bacteria (CMS/[facility]ly [facility])        Hospital Course  Chief complaint:  Chief Complaint   Patient presents with   • Fall       Ronald Reyer 65 y.o. male.    PCP  Provider, No Known (General)       65-year-old male presents the ER with a chief complaint of left elbow pain after being found on the floor at the [facility] today.  The patient reports he does not remember falling.  He states he probably just tripped.  The patient told me that he has only been at the [facility] for approximately a week.  However, with review of records it looks that the patient is a long-term [facility] resident.         Patient admitted with UTI and subsequently found to have bacteremia with E. coli.  He was appropriately treated.  He has renal insufficiency and for that reason he was seen in consult by nephrology.  Also pulmonology consulted once when he had respiratory compromise and acute respiratory failure.  He was moved to PCU and monitored.  Appears to be related to his sepsis present on admission.  He is doing much better and renal function is better.  Being discharged on a total of 14-day course of meropenem    Review of Systems   Unable to perform ROS: dementia           Family History   Problem Relation Age of Onset   • No Known Problems Mother    • No Known Problems Father         Past Medical History:   Diagnosis Date   • Antisocial personality disorder (CMS/Formerly [facility])    • Bipolar disorder (CMS/[facility]ly [facility])    •  Chronic kidney disease    • Convulsions (CMS/HCC)    • Diabetes mellitus (CMS/HCC)    • Hypertension    • Insomnia    • Renal disorder    • Schizophrenia (CMS/HCC)        History reviewed. No pertinent surgical history.    Social History     Socioeconomic History   • Marital status:      Spouse name: Not on file   • Number of children: Not on file   • Years of education: Not on file   • Highest education level: Not on file   Tobacco Use   • Smoking status: Current Every Day Smoker     Packs/day: 0.50     Types: Cigarettes   • Smokeless tobacco: Never Used   Substance and Sexual Activity   • Alcohol use: Never     Frequency: Never   • Drug use: Never   • Sexual activity: Defer       Vital Signs  Temp:  [98.3 °F (36.8 °C)-100.2 °F (37.9 °C)] 98.9 °F (37.2 °C)  Heart Rate:  [72-85] 84  Resp:  [14-20] 20  BP: (154-185)/(59-94) 163/68  Weight change: 1.188 kg (2 lb 9.9 oz)    Physical Exam:  Physical Exam  Vitals signs and nursing note reviewed.   Constitutional:       General: He is not in acute distress.     Appearance: Normal appearance. He is well-developed. He is not ill-appearing, toxic-appearing or diaphoretic.   HENT:      Head: Normocephalic and atraumatic.      Right Ear: Ear canal and external ear normal.      Left Ear: Ear canal and external ear normal.      Nose: Nose normal. No congestion or rhinorrhea.      Mouth/Throat:      Mouth: Mucous membranes are dry.      Pharynx: No oropharyngeal exudate.   Eyes:      General: No scleral icterus.        Right eye: No discharge.         Left eye: No discharge.      Extraocular Movements: Extraocular movements intact.      Conjunctiva/sclera: Conjunctivae normal.      Pupils: Pupils are equal, round, and reactive to light.   Neck:      Musculoskeletal: Normal range of motion and neck supple. No neck rigidity or muscular tenderness.      Thyroid: No thyromegaly.      Vascular: No carotid bruit or JVD.      Trachea: No tracheal deviation.   Cardiovascular:       Rate and Rhythm: Normal rate and regular rhythm.      Pulses: Normal pulses.      Heart sounds: Normal heart sounds. No murmur. No friction rub. No gallop.    Pulmonary:      Effort: Pulmonary effort is normal. No respiratory distress.      Breath sounds: No stridor. Wheezing  absent.  No rhonchi or rales.      Comments:     Chest:      Chest wall: No tenderness.   Abdominal:      General: Bowel sounds are normal. There is no distension.      Palpations: Abdomen is soft. There is no mass.      Tenderness: There is no abdominal tenderness. There is no guarding or rebound.      Hernia: No hernia is present.   Musculoskeletal: Normal range of motion.         General: No swelling, tenderness, deformity or signs of injury.      Right lower leg: No edema.      Left lower leg: No edema.   Lymphadenopathy:      Cervical: No cervical adenopathy.   Skin:     General: Skin is warm and dry.      Coloration: Skin is not jaundiced or pale.      Findings: No bruising, erythema or rash.   Neurological:      General: No focal deficit present.      Mental Status: He is alert. Mental status is at baseline.      Cranial Nerves: No cranial nerve deficit.      Sensory: No sensory deficit.      Motor: No weakness or abnormal muscle tone.      Coordination: Coordination normal.             Discharge Diagnosis:     Active Hospital Problems    Diagnosis  POA   • **E coli bacteremia [R78.81, B96.20]  Yes     Priority: Medium   • Acute respiratory failure with hypoxia (CMS/[facility]) [J96.01]  Yes   • SRAVAN (acute kidney injury) (CMS/Formerly [facility]) [N17.9]  Yes   • Stage 3b chronic kidney disease [N18.32]  Yes   • UTI (urinary tract infection), bacterial [N39.0, A49.9]  Yes   • Polypharmacy [Z79.899]  Not Applicable   • Fall [W19.XXXA]  Yes      Resolved Hospital Problems    Diagnosis Date Resolved POA   • Acute pulmonary embolism without acute cor pulmonale (CMS/Formerly [facility]) [I26.99] 01/06/2021 Clinically Undetermined     Priority: High   • Sepsis due to Gram  negative bacteria (CMS/[facility]) [A41.50] 01/08/2021 Yes       Estimated Creatinine Clearance: 48.7 mL/min (A) (by C-G formula based on SCr of 2 mg/dL (H)).    Discharge Disposition    Continued Care and Services - Admitted Since 1/1/2021     Destination Coordination complete    Service Provider Request Status Selected Services Address Phone Fax Patient Preferred    [facility]   Selected Assisted Living [address] [facility] [place] [address] [phone]                     PT Recommendation and Plan          Skilled Nursing Facility (DC - External)           Discharge Medications      New Medications      Instructions Start Date   amLODIPine 5 MG tablet  Commonly known as: NORVASC   5 mg, Oral, Every 24 Hours Scheduled   Start Date: January 9, 2021     meropenem (MERREM) 500mg IVPB in 100ml NS (MBP)   500 mg, Intravenous, Every 8 Hours         Changes to Medications      Instructions Start Date   QUEtiapine 50 MG tablet  Commonly known as: SEROquel  What changed:   · medication strength  · how much to take   50 mg, Oral, Nightly         Continue These Medications      Instructions Start Date   acetaminophen 325 MG tablet  Commonly known as: TYLENOL   650 mg, Oral, Every 4 Hours PRN      atenolol 50 MG tablet  Commonly known as: TENORMIN   50 mg, Oral, Daily      Biofreeze 4 % gel  Generic drug: Menthol (Topical Analgesic)   Apply externally, 2 Times Daily PRN, BL Feet legs back and hips       divalproex 125 MG DR tablet  Commonly known as: DEPAKOTE   125 mg, Oral, Daily      divalproex 250 MG DR tablet  Commonly known as: DEPAKOTE   250 mg, Oral, Daily      divalproex 500 MG DR tablet  Commonly known as: DEPAKOTE   500 mg, Oral, Nightly      fluPHENAZine 1 MG tablet  Commonly known as: PROLIXIN   1 mg, Oral, 3 Times Daily      GlucaGen HypoKit 1 MG injection  Generic drug: glucagon   1 mg, Intravenous, 2 Times Daily PRN      ibuprofen 800 MG tablet  Commonly known as: ADVIL,MOTRIN   800  mg, Oral, Every 8 Hours PRN      insulin glargine 100 UNIT/ML injection  Commonly known as: LANTUS, SEMGLEE   20 Units, Subcutaneous, Nightly      M-Vit tablet   1 tablet, Oral, Daily      melatonin 5 MG tablet tablet   10 mg, Oral, Nightly      mirtazapine 7.5 MG tablet  Commonly known as: REMERON   7.5 mg, Oral, Nightly      promethazine 12.5 MG tablet  Commonly known as: PHENERGAN   12.5 mg, Oral, Every 6 Hours PRN         Stop These Medications    lisinopril 5 MG tablet  Commonly known as: PRINIVIL,ZESTRIL          Discharge medications personally reviewed by me and med rec done by me personally.  01/08/21, 5:27 PM EST        Consults:   Consults     Date and Time Order Name Status Description    1/3/2021 1741 Inpatient Pulmonology Consult Completed     1/3/2021 1207 Inpatient Nephrology Consult Completed     1/1/2021 2007 Hospitalist (on-call MD unless specified) Completed           Procedures Performed:    * No surgery found *        Pertinent Test Results:   Results from last 7 days   Lab Units 01/08/21  0423 01/07/21  0216 01/06/21  0259  01/01/21  1733   WBC 10*3/mm3 12.70* 10.00 14.40*   < > 15.30*   HEMOGLOBIN g/dL 8.0* 8.5* 9.0*   < > 10.4*   HEMOGLOBIN, POC   --   --   --    < >  --    HEMATOCRIT % 23.8* 25.9* 27.4*   < > 31.3*   HEMATOCRIT POC   --   --   --    < >  --    MCV fL 89.3 89.8 88.5   < > 89.1   MCH pg 29.9 29.5 29.2   < > 29.6   PLATELETS 10*3/mm3 211 142 142   < > 162   MONOCYTES % %  --   --   --   --  17.0*    < > = values in this interval not displayed.     Results from last 7 days   Lab Units 01/08/21  0423 01/07/21  0216 01/06/21  0259 01/05/21  0351 01/04/21  0324 01/03/21  1752  01/01/21  1733   SODIUM mmol/L 143 140 136 132* 133* 132*  --  133*   POTASSIUM mmol/L 5.1 5.0 4.7 4.5 5.1 5.4*   < > 4.7   CHLORIDE mmol/L 115* 111* 107 101 103 99   < > 99   CO2 mmol/L 22.0 21.0* 21.0* 21.0* 20.0* 24.0   < > 24.0   BUN mg/dL 61* 72* 72* 75* 69* 66*   < > 35*   CREATININE mg/dL 2.00* 2.46*  2.63* 2.96* 3.41* 3.44*   < > 2.24*   CALCIUM mg/dL 8.3* 8.2* 8.4* 8.3* 8.7 8.7   < > 8.9   MAGNESIUM mg/dL  --   --  2.1 2.0  --  1.9  --   --    BILIRUBIN mg/dL  --   --   --   --  0.4 0.3  --  0.4   ALK PHOS U/L  --   --   --   --  274* 307*  --  100   ALT (SGPT) U/L  --   --   --   --  20 22  --  12   AST (SGOT) U/L  --   --   --   --  34 37  --  22   GLUCOSE mg/dL 86 110* 95 93 79 107*   < > 115*    < > = values in this interval not displayed.     Lab Results   Component Value Date    CALCIUM 8.3 (L) 01/08/2021    PHOS 3.8 01/08/2021     No results found for: HGBA1C  No results found for: CHOL, CHLPL, TRIG, HDL, LDL, LDLDIRECT  No results found for: LIPASE  Results from last 7 days   Lab Units 01/06/21  0409   PH, ARTERIAL pH units 7.393   PO2 ART mm Hg 63.4*   PCO2, ARTERIAL mm Hg 33.1*   HCO3 ART mmol/L 20.2*     Pathology  No results found for: INTRAOP, PREDX, FINALDX, COMDX  Inflammatory Biomarkers        Invalid input(s): ESR, D-DIMER QUANTITATIVE,  PROCALCITONIN  COVID19   Date Value Ref Range Status   01/06/2021 Not Detected Not Detected - Ref. Range Final        Microbiology Results (last 10 days)     Procedure Component Value - Date/Time    COVID-19,CEPHEID,COR/KACI/PAD IN-HOUSE(OR EMERGENT/ADD-ON),NP SWAB IN TRANSPORT MEDIA 3-4 HR TAT - Swab, Nasopharynx [[id number]]  (Normal) Collected: 01/06/21 0624    Lab Status: Final result Specimen: Swab from Nasopharynx Updated: 01/06/21 0712     COVID19 Not Detected    Narrative:      Fact sheet for providers: https://www.fda.gov/media/038900/download     Fact sheet for patients: https://www.fda.gov/media/093957/download    Blood Culture - Blood, Arm, Left [[id number]]  (Abnormal) Collected: 01/01/21 1224    Lab Status: Final result Specimen: Blood from Arm, Left Updated: 01/04/21 1031     Blood Culture Escherichia coli ESBL     Comment: Consider infectious disease consult.  Susceptibility results may not correlate to clinical outcomes.  For ESBL-producing  infections in the blood, a carbapenem is recommended as first-line therapy for optimal clinical outcomes.        Isolated from Aerobic and Anaerobic Bottles     Gram Stain Anaerobic Bottle Gram negative bacilli      Aerobic Bottle Gram negative bacilli    Narrative:      Refer to previous blood culture collected on 1/1/2021 2255 for MICs.    Blood Culture - Blood, Hand, Left [[id number]]  (Abnormal)  (Susceptibility) Collected: 01/01/21 2255    Lab Status: Final result Specimen: Blood from Hand, Left Updated: 01/04/21 1030     Blood Culture Escherichia coli ESBL     Comment: Consider infectious disease consult.  Susceptibility results may not correlate to clinical outcomes.  For ESBL-producing infections in the blood, a carbapenem is recommended as first-line therapy for optimal clinical outcomes.        Isolated from Aerobic and Anaerobic Bottles     Gram Stain Anaerobic Bottle Gram negative bacilli      Aerobic Bottle Gram negative bacilli    Susceptibility      Escherichia coli ESBL     ANA     Ertapenem Susceptible     Meropenem Susceptible                Susceptibility Comments     Escherichia coli ESBL    Cefazolin sensitivity will not be reported for Enterobacteriaceae in non-urine isolates. If cefazolin is preferred, please call the microbiology lab to request an E-test.  With the exception of urinary-sourced infections, aminoglycosides should not be used as monotherapy.             Blood Culture ID, PCR - Blood, Hand, Left [[id number]]  (Abnormal) Collected: 01/01/21 2255    Lab Status: Final result Specimen: Blood from Hand, Left Updated: 01/02/21 1047     BCID, PCR Escherichia coli. Identification by BCID PCR.     BOTTLE TYPE Anaerobic Bottle    COVID-19, ABBOTT IN-HOUSE,NASAL Swab (NO TRANSPORT MEDIA) 2 HR TAT - Swab, Nasopharynx [[id number]]  (Normal) Collected: 01/01/21 2013    Lab Status: Final result Specimen: Swab from Nasopharynx Updated: 01/01/21 2037     COVID19 Presumptive Negative    Narrative:        Fact sheet for providers: https://www.fda.gov/media/164778/download     Fact sheet for patients: https://www.fda.gov/media/096550/download    Test performed by PCR.  If inconsistent with clinical signs and symptoms patient should be tested with different authorized molecular test.    Urine Culture - Urine, Urine, Catheter [[id number]]  (Abnormal)  (Susceptibility) Collected: 01/01/21 1852    Lab Status: Final result Specimen: Urine, Catheter Updated: 01/03/21 0208     Urine Culture >100,000 CFU/mL Escherichia coli ESBL     Comment: Consider infectious disease consult.  Susceptibility results may not correlate to clinical outcomes.       Susceptibility      Escherichia coli ESBL     ANA     Amikacin Susceptible     Ertapenem Susceptible     Gentamicin Resistant     Levofloxacin Resistant     Meropenem Susceptible     Nitrofurantoin Susceptible     Piperacillin + Tazobactam Susceptible     Tetracycline Resistant     Tobramycin Resistant     Trimethoprim + Sulfamethoxazole Resistant                          ECG/EMG Results (most recent)     Procedure Component Value Units Date/Time    ECG 12 Lead [[id number]] Collected: 01/01/21 1728     Updated: 01/01/21 1729     QT Interval 371 ms     Narrative:      HEART RATE= 87  bpm  RR Interval= 692  ms  NC Interval= 120  ms  P Horizontal Axis= 5  deg  P Front Axis= 54  deg  QRSD Interval= 95  ms  QT Interval= 371  ms  QRS Axis= 23  deg  T Wave Axis= 76  deg  - ABNORMAL ECG -  Sinus rhythm  ST elevation, consider anterior injury  Electronically Signed By:   Date and Time of Study: 2021-01-01 17:28:09    Adult Transthoracic Echo Complete W/ Cont if Necessary Per Protocol [[id number]] Collected: 01/06/21 1005     Updated: 01/06/21 2012     BSA 2.1 m^2      RVIDd 2.7 cm      IVSd 1.0 cm      IVSs 1.8 cm      LVIDd 5.5 cm      LVIDs 3.9 cm      LVPWd 1.0 cm      BH CV ECHO RUSSELL - LVPWS 1.7 cm      IVS/LVPW 1.0     FS 28.2 %      EDV(Teich) 146.7 ml      ESV(Teich) 67.7 ml       EF(Teich) 53.9 %      EDV(cubed) 165.4 ml      ESV(cubed) 61.3 ml      EF(cubed) 62.9 %      % IVS thick 73.0 %      % LVPW thick 70.5 %      LV mass(C)d 216.1 grams      LV mass(C)dI 101.5 grams/m^2      LV mass(C)s 289.9 grams      LV mass(C)sI 136.1 grams/m^2      SV(Teich) 79.1 ml      SI(Teich) 37.1 ml/m^2      SV(cubed) 104.1 ml      SI(cubed) 48.8 ml/m^2      Ao root diam 3.2 cm      Ao root area 8.2 cm^2      ACS 2.0 cm      LVOT diam 2.1 cm      LVOT area 3.5 cm^2      EDV(MOD-sp4) 109.0 ml      ESV(MOD-sp4) 38.3 ml      EF(MOD-sp4) 64.8 %      SV(MOD-sp4) 70.7 ml      SI(MOD-sp4) 33.2 ml/m^2      Ao root area (BSA corrected) 1.5     LV Kirby Vol (BSA corrected) 51.2 ml/m^2      LV Sys Vol (BSA corrected) 18.0 ml/m^2      MV E max kasey 96.7 cm/sec      MV A max kasey 83.3 cm/sec      MV E/A 1.2     MV V2 max 101.4 cm/sec      MV max PG 4.1 mmHg      MV V2 mean 61.5 cm/sec      MV mean PG 1.7 mmHg      MV V2 VTI 31.3 cm      MVA(VTI) 2.4 cm^2      MV dec slope 418.7 cm/sec^2      MV dec time 0.23 sec      Ao pk kasey 136.3 cm/sec      Ao max PG 7.4 mmHg      Ao max PG (full) 2.1 mmHg      Ao V2 mean 97.3 cm/sec      Ao mean PG 4.1 mmHg      Ao mean PG (full) 1.2 mmHg      Ao V2 VTI 26.9 cm      CHRISTIANO(I,A) 2.7 cm^2      CHRISTIANO(I,D) 2.7 cm^2      CHRISTIANO(V,A) 2.9 cm^2      CHRISTIANO(V,D) 2.9 cm^2      LV V1 max PG 5.3 mmHg      LV V1 mean PG 2.9 mmHg      LV V1 max 115.1 cm/sec      LV V1 mean 78.8 cm/sec      LV V1 VTI 21.3 cm      SV(Ao) 221.8 ml      SI(Ao) 104.1 ml/m^2      SV(LVOT) 74.0 ml      SI(LVOT) 34.7 ml/m^2      PA V2 max 118.9 cm/sec      PA max PG 5.7 mmHg      PA max PG (full) 1.1 mmHg      PA V2 mean 86.5 cm/sec      PA mean PG 3.2 mmHg      PA mean PG (full) 1.1 mmHg      PA V2 VTI 29.0 cm      PA acc time 0.14 sec      RV V1 max PG 4.5 mmHg      RV V1 mean PG 2.1 mmHg      RV V1 max 106.6 cm/sec      RV V1 mean 66.4 cm/sec      RV V1 VTI 23.9 cm      TR max kasey 326.3 cm/sec      RVSP(TR) 50.6 mmHg      RAP  systole 8.0 mmHg      PA pr(Accel) 15.2 mmHg       CV ECHO RUSSELL - BZI_BMI 27.1 kilograms/m^2       CV ECHO RUSSELL - BSA(HAYCOCK) 2.2 m^2       CV ECHO RUSSELL - BZI_METRIC_WEIGHT 90.7 kg       CV ECHO RUSSELL - BZI_METRIC_HEIGHT 182.9 cm      Target HR (85%) 133 bpm      Max. Pred. HR (100%) 157 bpm      EF(MOD-bp) 65.0 %      LA dimension(2D) 4.0 cm      Echo EF Estimated 60 %     Narrative:      · Estimated left ventricular EF = 60% Estimated left ventricular EF was in   agreement with the calculated left ventricular EF. Left ventricular   systolic function is normal.  · Estimated right ventricular systolic pressure from tricuspid   regurgitation is moderately elevated (45-55 mmHg).  · Moderate pulmonary hypertension is present.             Results for orders placed during the hospital encounter of 01/01/21   Duplex Venous Lower Extremity - Bilateral CAR    Narrative · Normal bilateral lower extremity venous duplex scan.          Results for orders placed during the hospital encounter of 01/01/21   Adult Transthoracic Echo Complete W/ Cont if Necessary Per Protocol    Narrative · Estimated left ventricular EF = 60% Estimated left ventricular EF was in   agreement with the calculated left ventricular EF. Left ventricular   systolic function is normal.  · Estimated right ventricular systolic pressure from tricuspid   regurgitation is moderately elevated (45-55 mmHg).  · Moderate pulmonary hypertension is present.          Ct Head Without Contrast    Result Date: 1/1/2021   1. Motion artifact which limits the study, especially to the skull base. 2. No large parenchymal hemorrhage. 3. There appears to be volume loss secondary to atrophy.  Electronically Signed By-Erick Bautista MD On:1/1/2021 6:13 PM This report was finalized on [id number] by  Erick Bautista MD.    Ct Chest Without Contrast Diagnostic    Result Date: 1/5/2021  1. Small right and trace left pleural effusion similar to abdominal CT. 2. Anasarca with small  abdominal ascites. 3. Mild splenomegaly. 4. Cholelithiasis and additional incidental chronic findings above.  Electronically Signed By-Tahir Chase MD On:1/5/2021 4:28 PM This report was finalized on [id number] by  Tahir Chase MD.    Ct Cervical Spine Without Contrast    Result Date: 1/1/2021   1. Limited study due to marked motion artifact at the C4-C6 level. It is difficult to exclude an acute fracture or dislocation in this area. 2. Degenerative spondylosis and facet arthritis. 3. Varying degrees of canal and neural foraminal stenosis which was better appreciated on the previous CT exam where no motion artifact was apparent.  Electronically Signed By-Erick Bautista MD On:1/1/2021 6:34 PM This report was finalized on [id number] by  Erick Bautista MD.    Nm Lung Ventilation Perfusion Aerosol    Result Date: 1/6/2021  Compromised evaluation, although no definite evidence to suggest acute pulmonary embolus.  Electronically Signed By-Javed Wiggins MD On:1/6/2021 9:30 AM This report was finalized on [id number] by  Javed Wiggins MD.    Fl Video Swallow With Speech Single Contrast    Result Date: 1/6/2021  Technically successful modified barium swallow examination.  Electronically Signed By-Erick Bautista MD On:1/6/2021 2:39 PM This report was finalized on [id number] by  Erick Bautista MD.    Xr Chest 1 View    Result Date: 1/3/2021   1. No acute cardiopulmonary disease.   Electronically Signed By-Gera Young MD On:1/3/2021 6:28 PM This report was finalized on [id number] by  Gera Young MD.    Xr Chest 1 View    Result Date: 1/1/2021  No active disease.  Electronically Signed By-Erick Bautista MD On:1/1/2021 5:59 PM This report was finalized on [id number] by  Erick Bautista MD.    Ct Abdomen Pelvis Stone Protocol    Result Date: 1/4/2021   1. There is a small volume of scattered ascites, mild body wall edema and a small right-sided pleural effusion, which would indicate an abnormal fluid status. Liver  margins are lobulated, which could indicate cirrhosis. Correlate for the possibility of liver, kidney or heart failure 2. Bilateral perinephric stranding, which is nonspecific. There is mild thickening of the urinary bladder wall. This could be related to the patient's known urosepsis. 3. Mild osseous degenerative changes.  Electronically Signed By-Juanjo Pastor MD On:1/4/2021 2:27 PM This report was finalized on [id number] by  Juanjo Pastor MD.      Xrays, labs reviewed personally by me.  01/08/21  5:27 PM EST      Condition on Discharge:    Stable    Discharge Diet:   Dietary Orders (From admission, onward)     Start     Ordered    01/06/21 1619  Diet Texture; Mechanical Ground; Thickened Liquids (Nectar), No Straws  Diet Effective Now     Comments: NTL BY SPOON ONLY   Question Answer Comment   Diet / Texture / Consistency Texture    Select Type: Mechanical Ground    Diet Modifiers Thickened Liquids (Nectar)    Diet Modifiers No Straws        01/06/21 1619                Activity at Discharge:   Activity Instructions     Activity as Tolerated            Follow-up Appointments    No future appointments.    Additional Instructions for the Follow-ups that You Need to Schedule     Discharge Follow-up with PCP   As directed       Currently Documented PCP:    Provider, No Known    PCP Phone Number:    None     Follow Up Details: If no PCP, call MD finder at [phone]            Contact information for follow-up providers     Hermilo Wilson MD Follow up in 2 week(s).    Specialty: Nephrology  Contact information:  [address]  [place] [id number]  [phone]                   Contact information for after-discharge care     Destination     [facility] .    Service: Assisted Living  Contact information:  [address] [facility] [address] [phone]                              Test Results Pending at Discharge       Risk for Readmission (LACE) Score: 11 (1/8/2021  6:00  AM)        Time: I spent  more than 45  minutes on this discharge activity which included: face-to-face encounter with the patient, reviewing the data in the system, coordination of the care with the nursing staff as well as consultants, documentation, and entering orders.   Care coordination with nursing, , nephrology for discharge planning.  And with PICC team for midline.        Davide Mills MD  01/08/21  17:27 EST

## 2021-01-08 NOTE — PROGRESS NOTES
Continued Stay Note  JOHNIE William     Patient Name: Ronald Reyer  MRN: [id number]  Today's Date: 1/8/2021    Admit Date: 1/1/2021    Discharge Plan     Row Name 01/08/21 1339       Plan    Plan Comments  MD discussed with CM via secure chat that patient is scheduled to dc today. CM updated [facility] liason who advised she was getting his yellow zone room ready.      No physical contact with patient.    Jaz Blanchard RN       Office Phone: [phone]  Office Cell: [phone]

## 2021-01-08 NOTE — THERAPY TREATMENT NOTE
Patient Name: Ronald Reyer  : 1955    MRN: [id number]                              Today's Date: 2021       Admit Date: 2021    Visit Dx:     ICD-10-CM ICD-9-CM   1. Fall, initial encounter  W19.XXXA E888.9   2. Weakness  R53.1 780.79   3. Acute kidney injury (CMS/HCC)  N17.9 584.9   4. Urinary tract infection with hematuria, site unspecified  N39.0 599.0    R31.9 599.70     Patient Active Problem List   Diagnosis   • Fall   • SRAVAN (acute kidney injury) (CMS/[facility])   • Stage 3b chronic kidney disease   • UTI (urinary tract infection), bacterial   • Polypharmacy   • E coli bacteremia   • Acute respiratory failure with hypoxia (CMS/[facility])     Past Medical History:   Diagnosis Date   • Antisocial personality disorder (CMS/[facility])    • Bipolar disorder (CMS/[facility])    • Chronic kidney disease    • Convulsions (CMS/[facility])    • Diabetes mellitus (CMS/[facility])    • Hypertension    • Insomnia    • Renal disorder    • Schizophrenia (CMS/[facility])      History reviewed. No pertinent surgical history.  General Information     Row Name 21 1139          Physical Therapy Time and Intention    Document Type  therapy note (daily note)  -EL     Mode of Treatment  physical therapy  -EL       User Key  (r) = Recorded By, (t) = Taken By, (c) = Cosigned By    Initials Name Provider Type    aHrdy Morrison PT Physical Therapist        Mobility     Row Name 21 1139          Bed Mobility    Bed Mobility  bed mobility (all) activities  -EL     Rolling Left [place] (Bed Mobility)  moderate assist (50% patient effort);2 person assist  -EL     Rolling Right [place] (Bed Mobility)  moderate assist (50% patient effort);2 person assist  -EL     Supine-Sit [place] (Bed Mobility)  moderate assist (50% patient effort);maximum assist (25% patient effort);2 person assist  -EL     Sit-Supine [place] (Bed Mobility)  moderate assist (50% patient effort);maximum assist (25% patient effort);2 person assist  -EL     Assistive Device  (Bed Mobility)  bed rails  -EL     Comment (Bed Mobility)  Improved ability this date to come to sitting from last treatment, able to tolerated sitting for approx 10 min whle AAROM performed BUE and BLE  -EL       User Key  (r) = Recorded By, (t) = Taken By, (c) = Cosigned By    Initials Name Provider Type    Hardy Morrison PT Physical Therapist        Obj/Interventions     Row Name 01/08/21 1140          Motor Skills    Therapeutic Exercise  other (see comments) AAROM of BLE and BUE, ankle, knee, hip, elbow and shoulder  -EL     Row Name 01/08/21 1140          Balance    Balance Assessment  sitting static balance  -EL     Static Sitting Balance  mild impairment  -EL       User Key  (r) = Recorded By, (t) = Taken By, (c) = Cosigned By    Initials Name Provider Type    Hardy Morrison PT Physical Therapist        Goals/Plan    No documentation.       Clinical Impression     Row Name 01/08/21 1141          Pain Scale: FACES Pre/Post-Treatment    Pain: FACES Scale, Pretreatment  0-->no hurt  -EL     Posttreatment Pain Rating  0-->no hurt  -EL     Pre/Posttreatment Pain Comment  Occasionally moans, reports being stiff  -EL     Row Name 01/08/21 1141          Plan of Care Review    Plan of Care Reviewed With  patient  -EL     Outcome Summary  Pt tolerated treatment well this date. Was pleasant with PT and willing to participate. Pt requires MOD/MAX A x2 to come to sitting EOB. Pt remained sitting with CGA/MIN A x1 while PT assisted with AAROM of BLE and BUE. Pt tolerated movement well with occasion c/o of stiffness. After approx 10 min of sitting EOB pt was returned to supine. Pt requested water and is on NTL, CNA reports no thickener on unit and has to call dietary. Recommendation remains IP rehab following d/c. PPE worn includes gloves and mask with goggles.  -EL     Row Name 01/08/21 1141          Therapy Assessment/Plan (PT)    Rehab Potential (PT)  fair, will monitor progress closely  -EL     Criteria for Skilled  Interventions Met (PT)  yes  -EL     Row Name 01/08/21 1141          Vital Signs    O2 Delivery Pre Treatment  room air  -EL     O2 Delivery Intra Treatment  room air  -EL     O2 Delivery Post Treatment  room air  -EL     Pre Patient Position  Supine  -EL     Intra Patient Position  Sitting  -EL     Post Patient Position  Supine  -EL     Row Name 01/08/21 1141          Positioning and Restraints    Pre-Treatment Position  in bed  -EL     Post Treatment Position  bed  -EL     In Bed  notified nsg;supine;call light within reach;encouraged to call for assist;exit alarm on  -EL       User Key  (r) = Recorded By, (t) = Taken By, (c) = Cosigned By    Initials Name Provider Type    Hardy Morrison, PT Physical Therapist        Outcome Measures     Row Name 01/08/21 1146          How much help from another person do you currently need...    Turning from your back to your side while in flat bed without using bedrails?  2  -EL     Moving from lying on back to sitting on the side of a flat bed without bedrails?  2  -EL     Moving to and from a bed to a chair (including a wheelchair)?  1  -EL     Standing up from a chair using your arms (e.g., wheelchair, bedside chair)?  1  -EL     Climbing 3-5 steps with a railing?  1  -EL     To walk in hospital room?  1  -EL     AM-PAC 6 Clicks Score (PT)  8  -EL       User Key  (r) = Recorded By, (t) = Taken By, (c) = Cosigned By    Initials Name Provider Type    Hardy Morrison, PT Physical Therapist        Physical Therapy Education                 Title: PT OT SLP Therapies (Done)     Topic: Physical Therapy (Done)     Point: Mobility training (Done)     Learning Progress Summary           Patient Acceptance, E,TB, VU by NAA at 1/8/2021 1147    Acceptance, E,TB, VU by CINDY at 1/8/2021 0403    Nonacceptance, E,D, NL by  at 1/7/2021 1640    Comment: Attempted to instruct log rolling to eob and ergonomics for sidelying to sit.    Acceptance, E,TB, VU by CINDY at 1/7/2021 0215    Acceptance, E,TB,  NR by  at 1/6/2021 1655    Acceptance, E,TB, VU by  at 1/6/2021 0244    Acceptance, E,TB, VU,NR by  at 1/4/2021 2328    Acceptance, E, NR,Bed IU by SC at 1/4/2021 1849    Acceptance, E,TB, VU,NR,DU by  at 1/2/2021 1426                   Point: Home exercise program (Done)     Learning Progress Summary           Patient Acceptance, E,TB, VU by KM at 1/8/2021 0403    Nonacceptance, E,D, NL by  at 1/7/2021 1640    Comment: Attempted to instruct log rolling to eob and ergonomics for sidelying to sit.    Acceptance, E,TB, VU by KM at 1/7/2021 0215    Acceptance, E,TB, VU by KM at 1/6/2021 0244    Acceptance, E,TB, VU,NR by  at 1/4/2021 2328    Acceptance, E, NR,Bed IU by SC at 1/4/2021 1849    Acceptance, E,TB, VU,NR,DU by  at 1/2/2021 1426                   Point: Body mechanics (Done)     Learning Progress Summary           Patient Acceptance, E,TB, VU by KM at 1/8/2021 0403    Nonacceptance, E,D, NL by  at 1/7/2021 1640    Comment: Attempted to instruct log rolling to eob and ergonomics for sidelying to sit.    Acceptance, E,TB, VU by KM at 1/7/2021 0215    Acceptance, E,TB, VU by KM at 1/6/2021 0244    Acceptance, E,TB, VU,NR by  at 1/4/2021 2328    Acceptance, E, NR,Bed IU by SC at 1/4/2021 1849    Acceptance, E,TB, VU,NR,DU by  at 1/2/2021 1426                   Point: Precautions (Done)     Learning Progress Summary           Patient Acceptance, E,TB, VU by  at 1/8/2021 1147    Acceptance, E,TB, VU by  at 1/8/2021 0403    Nonacceptance, E,D, NL by  at 1/7/2021 1640    Comment: Attempted to instruct log rolling to eob and ergonomics for sidelying to sit.    Acceptance, E,TB, VU by KM at 1/7/2021 0215    Acceptance, E,TB, NR by EL at 1/6/2021 1655    Acceptance, E,TB, VU by KM at 1/6/2021 0244    Acceptance, E,TB, VU,NR by  at 1/4/2021 2328    Acceptance, E, NR,Bed IU by SC at 1/4/2021 1849    Acceptance, E,TB, VU,NR,DU by  at 1/2/2021 1426                               User Key      Initials Effective Dates Name Provider Type Discipline    SC 03/01/19 -  Apple Nguyen PTA Physical Therapy Assistant PT     03/01/19 -  Aury Smith PTA Physical Therapy Assistant PT     06/23/20 -  Hardy Cheng PT Physical Therapist PT     04/01/20 -  Lisa Andino RN Registered Nurse Nurse    KM 10/23/19 -  Kathie Townsend, RN Registered Nurse Nurse     01/07/20 -  Eloisa Nava PT Physical Therapist PT              PT Recommendation and Plan     Plan of Care Reviewed With: patient  Outcome Summary: Pt tolerated treatment well this date. Was pleasant with PT and willing to participate. Pt requires MOD/MAX A x2 to come to sitting EOB. Pt remained sitting with CGA/MIN A x1 while PT assisted with AAROM of BLE and BUE. Pt tolerated movement well with occasion c/o of stiffness. After approx 10 min of sitting EOB pt was returned to supine. Pt requested water and is on NTL, CNA reports no thickener on unit and has to call dietary. Recommendation remains IP rehab following d/c. PPE worn includes gloves and mask with goggles.     Time Calculation:   PT Charges     Row Name 01/08/21 1147             Time Calculation    Start Time  1002  -EL      Stop Time  1020  -EL      Time Calculation (min)  18 min  -EL      PT Received On  01/08/21  -EL      PT - Next Appointment  01/11/21  -EL         Time Calculation- PT    Total Timed Code Minutes- PT  18 minute(s)  -EL        User Key  (r) = Recorded By, (t) = Taken By, (c) = Cosigned By    Initials Name Provider Type     Hardy Cheng PT Physical Therapist        Therapy Charges for Today     Code Description Service Date Service Provider Modifiers Qty    [id number]  PT THERAPEUTIC ACT EA 15 MIN 1/8/2021 Hardy Cheng, PT GP 1          PT G-Codes  Outcome Measure Options: AM-PAC 6 Clicks Basic Mobility (PT)  AM-PAC 6 Clicks Score (PT): 8  Modified [place] Scale: 5 - Severe disability.  Bedridden, incontinent, and requiring constant nursing care and  attention.    Hardy Cheng, PT  1/8/2021

## 2021-01-09 NOTE — PROGRESS NOTES
"PULMONARY CRITICAL CARE Progress  NOTE      PATIENT IDENTIFICATION:  Name: Reyer, Ronald  MRN: [id number]  :  1955     Age: 65 y.o.  Sex: male    DATE OF Note:  2021   Referring Physician: Davide Mills MD                  Subjective:   More confused restless  On RA  No nausea or vomiting, no change in bowel habit, no dysuria,  no new  skin rash or itching.      Objective:  tMax 24 hrs: Temp (24hrs), Av.2 °F (37.3 °C), Min:98.9 °F (37.2 °C), Max:100.2 °F (37.9 °C)      Vitals Ranges:   Temp:  [98.9 °F (37.2 °C)-100.2 °F (37.9 °C)] 99 °F (37.2 °C)  Heart Rate:  [72-85] 79  Resp:  [14-24] 24  BP: (154-175)/(59-94) 154/69    Intake and Output Last 3 Shifts:   I/O last 3 completed shifts:  In: 3215 [P.O.:1080; I.V.:; IV Piggyback:100]  Out: -     Exam:  /69   Pulse 79   Temp 99 °F (37.2 °C) (Oral)   Resp 24   Ht 182.9 cm (72\")   Wt 91 kg (200 lb 9.9 oz)   SpO2 96%   BMI 27.21 kg/m²     General Appearance:   Looks fatigue tachypneic  HEENT:  Normocephalic, without obvious abnormality, Conjunctiva/corneas clear,.  Normal external ear canals, Nares normal, no drainage     Neck:  Supple, symmetrical, trachea midline. No JVD.  Lungs /Chest wall:   Bilateral basal rhonchi, respirations unlabored symmetrical wall movement.     Heart:  Regular rate and rhythm, systolic murmur PMI left sternal border  Abdomen: Soft, non-tender, no masses, no organomegaly.    Extremities: Trace edema no clubbing or Cyanosis        Medications:    Current Facility-Administered Medications:   •  acetaminophen (TYLENOL) tablet 650 mg, 650 mg, Oral, Q4H PRN, 650 mg at 21 0107 **OR** acetaminophen (TYLENOL) 160 MG/5ML solution 650 mg, 650 mg, Oral, Q4H PRN **OR** acetaminophen (TYLENOL) suppository 650 mg, 650 mg, Rectal, Q4H PRN, Anita Farnsworth FNP, 650 mg at 21 1905  •  amLODIPine (NORVASC) tablet 5 mg, 5 mg, Oral, Q24H, Hermilo Wilson MD, 5 mg at 21 0901  •  atenolol (TENORMIN) tablet 100 mg, 100 mg, " Oral, Daily, Davide Mills MD, 100 mg at 01/08/21 0449  •  dextrose (D50W) 25 g/ 50mL Intravenous Solution 25 g, 25 g, Intravenous, Q15 Min PRN, Anita Farnsworth FNP  •  dextrose (GLUTOSE) oral gel 15 g, 15 g, Oral, Q15 Min PRN, Anita Farnsworth FNP  •  divalproex (DEPAKOTE) DR tablet 125 mg, 125 mg, Oral, Daily, Anita Farnsworth FNP, 125 mg at 01/08/21 1357  •  divalproex (DEPAKOTE) DR tablet 250 mg, 250 mg, Oral, Daily, Anita Farnsworth FNP, 250 mg at 01/08/21 0849  •  divalproex (DEPAKOTE) DR tablet 500 mg, 500 mg, Oral, Nightly, Anita Farnsworth FNP, 500 mg at 01/08/21 2034  •  enoxaparin (LOVENOX) syringe 40 mg, 40 mg, Subcutaneous, Q12H, Davide Mills MD, 40 mg at 01/08/21 1800  •  glucagon (human recombinant) (GLUCAGEN DIAGNOSTIC) injection 1 mg, 1 mg, Subcutaneous, Q15 Min PRN, Anita Farnsworth FNP  •  haloperidol lactate (HALDOL) injection 1 mg, 1 mg, Intravenous, Q6H PRN, Davide Mills MD  •  insulin glargine (LANTUS, SEMGLEE) injection 18 Units, 18 Units, Subcutaneous, Nightly, Anita Farnsworth FNP, 18 Units at 01/08/21 2039  •  insulin lispro (humaLOG, ADMELOG) injection 0-7 Units, 0-7 Units, Subcutaneous, TID AC, 2 Units at 01/07/21 1811 **AND** insulin lispro (humaLOG, ADMELOG) injection 0-7 Units, 0-7 Units, Subcutaneous, PRN, Anita Farnsworth FNP  •  ipratropium-albuterol (DUO-NEB) nebulizer solution 3 mL, 3 mL, Nebulization, 4x Daily - RT, Davide Mills MD, 3 mL at 01/08/21 2103  •  LORazepam (ATIVAN) injection 0.5 mg, 0.5 mg, Intravenous, Q4H PRN, Davide Mills MD, 0.5 mg at 01/07/21 1314  •  melatonin tablet 10 mg, 10 mg, Oral, Nightly, Anita Farnsworth FNP, 10 mg at 01/08/21 2031  •  meropenem (MERREM) 500 mg in sodium chloride 0.9 % 100 mL IVPB, 500 mg, Intravenous, Q8H, Davide Mills MD, 500 mg at 01/08/21 2034  •  mirtazapine (REMERON) tablet 7.5 mg, 7.5 mg, Oral, Nightly, Anita Farnsworth FNP, 7.5 mg at 01/08/21 2036  •  ondansetron (ZOFRAN) tablet 4 mg, 4 mg, Oral, Q6H PRN **OR** ondansetron (ZOFRAN) injection 4 mg, 4 mg, Intravenous, Q6H  Javad SORIA Karen, FNP  •  polyethylene glycol (MIRALAX) packet 17 g, 17 g, Oral, BID, Davide Mills MD, 17 g at 01/07/21 2159  •  QUEtiapine (SEROquel) tablet 50 mg, 50 mg, Oral, Nightly, Davide Mills MD, 50 mg at 01/08/21 2036  •  sodium chloride 0.9 % flush 10 mL, 10 mL, Intravenous, Q12H, Anita Farnsworth FNP, 10 mL at 01/08/21 2035  •  sodium chloride 0.9 % flush 10 mL, 10 mL, Intravenous, PRJavad SANCHEZ Karen, FNP  •  sodium chloride 0.9 % flush 10 mL, 10 mL, Intravenous, Q12H, Davide Mills MD, 10 mL at 01/08/21 2038  •  sodium chloride 0.9 % flush 10 mL, 10 mL, Intravenous, Lina SORIA Ravi, MD  •  sodium chloride 0.9 % flush 20 mL, 20 mL, Intravenous, PRLina SANCHEZ Ravi, MD  •  sodium chloride 0.9 % infusion, 75 mL/hr, Intravenous, Continuous, Hermilo Wilson MD, Last Rate: 75 mL/hr at 01/07/21 2258, 75 mL/hr at 01/07/21 2258  •  tamsulosin (FLOMAX) 24 hr capsule 0.4 mg, 0.4 mg, Oral, Daily, Hermilo Wilson MD, 0.4 mg at 01/08/21 0849    Data Review:  All labs (24hrs):   Recent Results (from the past 24 hour(s))   Renal Function Panel    Collection Time: 01/08/21  4:23 AM    Specimen: Blood   Result Value Ref Range    Glucose 86 65 - 99 mg/dL    BUN 61 (H) 8 - 23 mg/dL    Creatinine 2.00 (H) 0.76 - 1.27 mg/dL    Sodium 143 136 - 145 mmol/L    Potassium 5.1 3.5 - 5.2 mmol/L    Chloride 115 (H) 98 - 107 mmol/L    CO2 22.0 22.0 - 29.0 mmol/L    Calcium 8.3 (L) 8.6 - 10.5 mg/dL    Albumin 1.80 (L) 3.50 - 5.20 g/dL    Phosphorus 3.8 2.5 - 4.5 mg/dL    Anion Gap 6.0 5.0 - 15.0 mmol/L    BUN/Creatinine Ratio 30.5 (H) 7.0 - 25.0    eGFR Non African Amer 34 (L) >60 mL/min/1.73   CBC (No Diff)    Collection Time: 01/08/21  4:23 AM    Specimen: Blood   Result Value Ref Range    WBC 12.70 (H) 3.40 - 10.80 10*3/mm3    RBC 2.67 (L) 4.14 - 5.80 10*6/mm3    Hemoglobin 8.0 (L) 13.0 - 17.7 g/dL    Hematocrit 23.8 (L) 37.5 - 51.0 %    MCV 89.3 79.0 - 97.0 fL    MCH 29.9 26.6 - 33.0 pg    MCHC 33.5 31.5 - 35.7 g/dL    RDW 16.7 (H) 12.3 - 15.4 %    RDW-SD  52.5 37.0 - 54.0 fl    MPV 7.5 6.0 - 12.0 fL    Platelets 211 140 - 450 10*3/mm3   POC Glucose Once    Collection Time: 01/08/21  7:13 AM    Specimen: Blood   Result Value Ref Range    Glucose 76 70 - 105 mg/dL   POC Glucose Once    Collection Time: 01/08/21 11:14 AM    Specimen: Blood   Result Value Ref Range    Glucose 164 (H) 70 - 105 mg/dL   POC Glucose Once    Collection Time: 01/08/21  4:36 PM    Specimen: Blood   Result Value Ref Range    Glucose 116 (H) 70 - 105 mg/dL        Imaging:  Adult Transthoracic Echo Complete W/ Cont if Necessary Per Protocol  · Estimated left ventricular EF = 60% Estimated left ventricular EF was in   agreement with the calculated left ventricular EF. Left ventricular   systolic function is normal.  · Estimated right ventricular systolic pressure from tricuspid   regurgitation is moderately elevated (45-55 mmHg).  · Moderate pulmonary hypertension is present.     FL Video Swallow With Speech Single Contrast  Narrative: DATE OF EXAM:  1/6/2021 12:30 PM     PROCEDURE:  FL VIDEO SWALLOW W SPEECH SINGLE-CONTRAST-     INDICATIONS:  Dysphagia.     COMPARISON:  No Comparisons Available     TECHNIQUE:   This examination was performed in conjunction with speech pathology.  Lateral video fluoroscopic evaluation of the swallowing mechanism was  performed while correlate administering to the patient various  consistency food items mixed with barium.     Fluoroscopic Time:   3.3 minutes     FINDINGS:  Please see the speech pathologist's report with regard to detailed  findings from the procedure and feeding recommendations.      Impression: Technically successful modified barium swallow examination.     Electronically Signed By-Erick Bautista MD On:1/6/2021 2:39 PM  This report was finalized on [id number] by  Erick Bautista MD.  Duplex Venous Lower Extremity - Bilateral CAR  · Normal bilateral lower extremity venous duplex scan.     NM Lung Ventilation Perfusion Aerosol  Narrative: DATE OF  EXAM:  1/6/2021 9:16 AM     PROCEDURE:  NM LUNG VENTILATION PERFUSION AEROSOL-     INDICATIONS:  Chest pain, acute, PE suspected, intermed prob, negative D-dimer;  W19.XXXA-Unspecified fall, initial encounter; R53.1-Weakness;  N17.9-Acute kidney failure, unspecified; N39.0-Urinary tract infection,  site not specified; R31.9-Hematuria, unspecified     COMPARISON:  CT chest from 01/05/2021     TECHNIQUE:   The patient was ventilated with 49.8 mCi of technetium 99m pertechnetate  aerosol and ventilation images were acquired in multiple obliquities.  The patient then received 5.6 mCi of technetium 99m MAA intravenously  and perfusion images were acquired in multiple obliquities.     FINDINGS:  Examination is compromised due to patient not being cooperative with the  examination. Only anterior, posterior, and lateral images were obtained.  No definite wedge-shaped V/Q mismatches are identified to suggest  pulmonary embolus.     Impression: Compromised evaluation, although no definite evidence to suggest acute  pulmonary embolus.     Electronically Signed By-Javed Wiggins MD On:1/6/2021 9:30 AM  This report was finalized on [id number] by  Javed Wiggins MD.       ASSESSMENT:    E coli bacteremia    Fall    SRAVAN (acute kidney injury) (CMS/[facility])    Stage 3b chronic kidney disease    UTI (urinary tract infection), bacterial    Polypharmacy    Acute respiratory failure with hypoxia (CMS/[facility])     pulm HTN    PLAN:  Continue    IS/ Flutter valve   Continue the current antibiotics  Bronchodilator  Inhaled corticosteroids  Electrolytes/ glycemic control  DVT and GI prophylaxis.    Total Critical care time in direct medical management (   ) minutes  Chetna Haines MD. D, ABSM.     1/8/2021  23:30 EST

## 2021-01-09 NOTE — CONSULTS
picc team consult:    Procedure explained to patient, patient confused but cooperative.  Power glide midline catheter placed RUE basilic vessel utilizing US guidance and sterile technique. Dark venous blood return noted and line flushes without difficulty.  Primary rn notified of midline status and ability to utilize line at this time.

## 2021-01-11 NOTE — PROGRESS NOTES
Case Management Discharge Note      Final Note: [facility]      Selected Continued Care - Discharged on 1/8/2021 Admission date: 1/1/2021 - Discharge disposition: Skilled Nursing Facility (DC - External)    Destination Coordination complete    Service Provider Selected Services Address Phone Fax Patient Preferred    [facility]  Assisted Living [address] [facility] [place] [address] [phone]            Final Discharge Disposition Code:  - intermediate care facility

## 2021-01-29 ENCOUNTER — HOSPITAL ENCOUNTER (OUTPATIENT)
Facility: HOSPITAL | Age: 66
Setting detail: OBSERVATION
Discharge: NURSING, REHAB OR LONG-TERM CARE | End: 2021-02-01
Attending: EMERGENCY MEDICINE | Admitting: FAMILY MEDICINE

## 2021-01-29 DIAGNOSIS — D64.9 ANEMIA, UNSPECIFIED TYPE: Primary | ICD-10-CM

## 2021-01-29 LAB
ABO GROUP BLD: NORMAL
ALBUMIN SERPL-MCNC: 2.5 G/DL (ref 3.5–5.2)
ALBUMIN/GLOB SERPL: 0.5 G/DL
ALP SERPL-CCNC: 128 U/L (ref 39–117)
ALT SERPL W P-5'-P-CCNC: 17 U/L (ref 1–41)
ANION GAP SERPL CALCULATED.3IONS-SCNC: 6 MMOL/L (ref 5–15)
APTT PPP: 25.9 SECONDS (ref 24–31)
AST SERPL-CCNC: 22 U/L (ref 1–40)
BASOPHILS # BLD AUTO: 0 10*3/MM3 (ref 0–0.2)
BASOPHILS NFR BLD AUTO: 0.3 % (ref 0–1.5)
BILIRUB SERPL-MCNC: 0.2 MG/DL (ref 0–1.2)
BLD GP AB SCN SERPL QL: NEGATIVE
BUN SERPL-MCNC: 27 MG/DL (ref 8–23)
BUN/CREAT SERPL: 18.6 (ref 7–25)
CALCIUM SPEC-SCNC: 8.4 MG/DL (ref 8.6–10.5)
CHLORIDE SERPL-SCNC: 101 MMOL/L (ref 98–107)
CO2 SERPL-SCNC: 30 MMOL/L (ref 22–29)
CREAT SERPL-MCNC: 1.45 MG/DL (ref 0.76–1.27)
DEPRECATED RDW RBC AUTO: 50.8 FL (ref 37–54)
EOSINOPHIL # BLD AUTO: 0 10*3/MM3 (ref 0–0.4)
EOSINOPHIL NFR BLD AUTO: 0.6 % (ref 0.3–6.2)
ERYTHROCYTE [DISTWIDTH] IN BLOOD BY AUTOMATED COUNT: 16 % (ref 12.3–15.4)
FERRITIN SERPL-MCNC: 404.9 NG/ML (ref 30–400)
FOLATE SERPL-MCNC: >20 NG/ML (ref 4.78–24.2)
GFR SERPL CREATININE-BSD FRML MDRD: 49 ML/MIN/1.73
GLOBULIN UR ELPH-MCNC: 5.2 GM/DL
GLUCOSE BLDC GLUCOMTR-MCNC: 120 MG/DL (ref 70–105)
GLUCOSE BLDC GLUCOMTR-MCNC: 147 MG/DL (ref 70–105)
GLUCOSE SERPL-MCNC: 108 MG/DL (ref 65–99)
HAPTOGLOB SERPL-MCNC: 224 MG/DL (ref 30–200)
HBA1C MFR BLD: 5.7 % (ref 3.5–5.6)
HCT VFR BLD AUTO: 22.3 % (ref 37.5–51)
HGB BLD-MCNC: 7.2 G/DL (ref 13–17.7)
HOLD SPECIMEN: NORMAL
INR PPP: 1.04 (ref 0.93–1.1)
IRON 24H UR-MRATE: 16 MCG/DL (ref 59–158)
IRON SATN MFR SERPL: 6 % (ref 20–50)
LDH SERPL-CCNC: 164 U/L (ref 135–225)
LYMPHOCYTES # BLD AUTO: 2 10*3/MM3 (ref 0.7–3.1)
LYMPHOCYTES NFR BLD AUTO: 31.3 % (ref 19.6–45.3)
MCH RBC QN AUTO: 29 PG (ref 26.6–33)
MCHC RBC AUTO-ENTMCNC: 32.2 G/DL (ref 31.5–35.7)
MCV RBC AUTO: 89.9 FL (ref 79–97)
MONOCYTES # BLD AUTO: 0.6 10*3/MM3 (ref 0.1–0.9)
MONOCYTES NFR BLD AUTO: 9.2 % (ref 5–12)
NEUTROPHILS NFR BLD AUTO: 3.7 10*3/MM3 (ref 1.7–7)
NEUTROPHILS NFR BLD AUTO: 58.6 % (ref 42.7–76)
NRBC BLD AUTO-RTO: 0 /100 WBC (ref 0–0.2)
PLATELET # BLD AUTO: 218 10*3/MM3 (ref 140–450)
PMV BLD AUTO: 7.2 FL (ref 6–12)
POTASSIUM SERPL-SCNC: 4.6 MMOL/L (ref 3.5–5.2)
PROT SERPL-MCNC: 7.7 G/DL (ref 6–8.5)
PROTHROMBIN TIME: 11.4 SECONDS (ref 9.6–11.7)
RBC # BLD AUTO: 2.48 10*6/MM3 (ref 4.14–5.8)
RETICS # AUTO: 0.05 10*6/MM3 (ref 0.02–0.13)
RETICS/RBC NFR AUTO: 2.07 % (ref 0.7–1.9)
RH BLD: POSITIVE
SODIUM SERPL-SCNC: 137 MMOL/L (ref 136–145)
T&S EXPIRATION DATE: NORMAL
TIBC SERPL-MCNC: 249 MCG/DL (ref 298–536)
TRANSFERRIN SERPL-MCNC: 167 MG/DL (ref 200–360)
VALPROATE SERPL-MCNC: 11.5 MCG/ML (ref 50–125)
VIT B12 BLD-MCNC: 552 PG/ML (ref 211–946)
WBC # BLD AUTO: 6.2 10*3/MM3 (ref 3.4–10.8)

## 2021-01-29 PROCEDURE — 83615 LACTATE (LD) (LDH) ENZYME: CPT | Performed by: NURSE PRACTITIONER

## 2021-01-29 PROCEDURE — 85045 AUTOMATED RETICULOCYTE COUNT: CPT | Performed by: NURSE PRACTITIONER

## 2021-01-29 PROCEDURE — 80053 COMPREHEN METABOLIC PANEL: CPT | Performed by: EMERGENCY MEDICINE

## 2021-01-29 PROCEDURE — G0378 HOSPITAL OBSERVATION PER HR: HCPCS

## 2021-01-29 PROCEDURE — 83540 ASSAY OF IRON: CPT | Performed by: NURSE PRACTITIONER

## 2021-01-29 PROCEDURE — 80164 ASSAY DIPROPYLACETIC ACD TOT: CPT | Performed by: EMERGENCY MEDICINE

## 2021-01-29 PROCEDURE — 86901 BLOOD TYPING SEROLOGIC RH(D): CPT

## 2021-01-29 PROCEDURE — 99284 EMERGENCY DEPT VISIT MOD MDM: CPT

## 2021-01-29 PROCEDURE — 63710000001 INSULIN GLARGINE PER 5 UNITS: Performed by: NURSE PRACTITIONER

## 2021-01-29 PROCEDURE — 25010000002 MEROPENEM PER 100 MG: Performed by: INTERNAL MEDICINE

## 2021-01-29 PROCEDURE — 99219 PR INITIAL OBSERVATION CARE/DAY 50 MINUTES: CPT | Performed by: NURSE PRACTITIONER

## 2021-01-29 PROCEDURE — 82962 GLUCOSE BLOOD TEST: CPT

## 2021-01-29 PROCEDURE — 82607 VITAMIN B-12: CPT | Performed by: NURSE PRACTITIONER

## 2021-01-29 PROCEDURE — 85730 THROMBOPLASTIN TIME PARTIAL: CPT | Performed by: EMERGENCY MEDICINE

## 2021-01-29 PROCEDURE — 82728 ASSAY OF FERRITIN: CPT | Performed by: NURSE PRACTITIONER

## 2021-01-29 PROCEDURE — 83010 ASSAY OF HAPTOGLOBIN QUANT: CPT | Performed by: NURSE PRACTITIONER

## 2021-01-29 PROCEDURE — 86901 BLOOD TYPING SEROLOGIC RH(D): CPT | Performed by: EMERGENCY MEDICINE

## 2021-01-29 PROCEDURE — 82746 ASSAY OF FOLIC ACID SERUM: CPT | Performed by: NURSE PRACTITIONER

## 2021-01-29 PROCEDURE — 83036 HEMOGLOBIN GLYCOSYLATED A1C: CPT | Performed by: NURSE PRACTITIONER

## 2021-01-29 PROCEDURE — 84466 ASSAY OF TRANSFERRIN: CPT | Performed by: NURSE PRACTITIONER

## 2021-01-29 PROCEDURE — 85610 PROTHROMBIN TIME: CPT | Performed by: EMERGENCY MEDICINE

## 2021-01-29 PROCEDURE — 85025 COMPLETE CBC W/AUTO DIFF WBC: CPT | Performed by: EMERGENCY MEDICINE

## 2021-01-29 PROCEDURE — 86900 BLOOD TYPING SEROLOGIC ABO: CPT | Performed by: EMERGENCY MEDICINE

## 2021-01-29 PROCEDURE — 86850 RBC ANTIBODY SCREEN: CPT | Performed by: EMERGENCY MEDICINE

## 2021-01-29 PROCEDURE — 86900 BLOOD TYPING SEROLOGIC ABO: CPT

## 2021-01-29 RX ORDER — DIVALPROEX SODIUM 125 MG/1
375 TABLET, DELAYED RELEASE ORAL DAILY
Status: DISCONTINUED | OUTPATIENT
Start: 2021-01-30 | End: 2021-01-31

## 2021-01-29 RX ORDER — INSULIN LISPRO 100 [IU]/ML
0-7 INJECTION, SOLUTION INTRAVENOUS; SUBCUTANEOUS AS NEEDED
Status: DISCONTINUED | OUTPATIENT
Start: 2021-01-29 | End: 2021-02-01 | Stop reason: HOSPADM

## 2021-01-29 RX ORDER — ACETAMINOPHEN 325 MG/1
650 TABLET ORAL EVERY 4 HOURS PRN
Status: DISCONTINUED | OUTPATIENT
Start: 2021-01-29 | End: 2021-01-29 | Stop reason: SDUPTHER

## 2021-01-29 RX ORDER — ONDANSETRON 2 MG/ML
4 INJECTION INTRAMUSCULAR; INTRAVENOUS EVERY 6 HOURS PRN
Status: DISCONTINUED | OUTPATIENT
Start: 2021-01-29 | End: 2021-02-01 | Stop reason: HOSPADM

## 2021-01-29 RX ORDER — ONDANSETRON 4 MG/1
4 TABLET, FILM COATED ORAL EVERY 6 HOURS PRN
Status: DISCONTINUED | OUTPATIENT
Start: 2021-01-29 | End: 2021-02-01 | Stop reason: HOSPADM

## 2021-01-29 RX ORDER — NICOTINE POLACRILEX 4 MG
15 LOZENGE BUCCAL
Status: DISCONTINUED | OUTPATIENT
Start: 2021-01-29 | End: 2021-02-01 | Stop reason: HOSPADM

## 2021-01-29 RX ORDER — DIVALPROEX SODIUM 250 MG/1
250 TABLET, DELAYED RELEASE ORAL DAILY
Status: DISCONTINUED | OUTPATIENT
Start: 2021-01-29 | End: 2021-01-29

## 2021-01-29 RX ORDER — ACETAMINOPHEN 160 MG/5ML
650 SOLUTION ORAL EVERY 4 HOURS PRN
Status: DISCONTINUED | OUTPATIENT
Start: 2021-01-29 | End: 2021-02-01 | Stop reason: HOSPADM

## 2021-01-29 RX ORDER — INSULIN GLARGINE 100 [IU]/ML
20 INJECTION, SOLUTION SUBCUTANEOUS NIGHTLY
Status: DISCONTINUED | OUTPATIENT
Start: 2021-01-29 | End: 2021-01-31

## 2021-01-29 RX ORDER — ALUMINA, MAGNESIA, AND SIMETHICONE 2400; 2400; 240 MG/30ML; MG/30ML; MG/30ML
15 SUSPENSION ORAL EVERY 6 HOURS PRN
Status: DISCONTINUED | OUTPATIENT
Start: 2021-01-29 | End: 2021-02-01 | Stop reason: HOSPADM

## 2021-01-29 RX ORDER — QUETIAPINE FUMARATE 25 MG/1
50 TABLET, FILM COATED ORAL NIGHTLY
Status: DISCONTINUED | OUTPATIENT
Start: 2021-01-29 | End: 2021-02-01 | Stop reason: HOSPADM

## 2021-01-29 RX ORDER — INSULIN LISPRO 100 [IU]/ML
0-7 INJECTION, SOLUTION INTRAVENOUS; SUBCUTANEOUS
Status: DISCONTINUED | OUTPATIENT
Start: 2021-01-29 | End: 2021-02-01 | Stop reason: HOSPADM

## 2021-01-29 RX ORDER — DIVALPROEX SODIUM 500 MG/1
500 TABLET, DELAYED RELEASE ORAL NIGHTLY
Status: DISCONTINUED | OUTPATIENT
Start: 2021-01-29 | End: 2021-01-31

## 2021-01-29 RX ORDER — SODIUM CHLORIDE 0.9 % (FLUSH) 0.9 %
10 SYRINGE (ML) INJECTION EVERY 12 HOURS SCHEDULED
Status: DISCONTINUED | OUTPATIENT
Start: 2021-01-29 | End: 2021-02-01 | Stop reason: HOSPADM

## 2021-01-29 RX ORDER — PROMETHAZINE HYDROCHLORIDE 25 MG/1
12.5 TABLET ORAL EVERY 6 HOURS PRN
Status: DISCONTINUED | OUTPATIENT
Start: 2021-01-29 | End: 2021-02-01 | Stop reason: HOSPADM

## 2021-01-29 RX ORDER — ACETAMINOPHEN 650 MG/1
650 SUPPOSITORY RECTAL EVERY 4 HOURS PRN
Status: DISCONTINUED | OUTPATIENT
Start: 2021-01-29 | End: 2021-02-01 | Stop reason: HOSPADM

## 2021-01-29 RX ORDER — ATENOLOL 50 MG/1
50 TABLET ORAL DAILY
Status: DISCONTINUED | OUTPATIENT
Start: 2021-01-29 | End: 2021-02-01 | Stop reason: HOSPADM

## 2021-01-29 RX ORDER — DEXTROSE MONOHYDRATE 25 G/50ML
25 INJECTION, SOLUTION INTRAVENOUS
Status: DISCONTINUED | OUTPATIENT
Start: 2021-01-29 | End: 2021-02-01 | Stop reason: HOSPADM

## 2021-01-29 RX ORDER — AMLODIPINE BESYLATE 5 MG/1
5 TABLET ORAL
Status: DISCONTINUED | OUTPATIENT
Start: 2021-01-29 | End: 2021-02-01 | Stop reason: HOSPADM

## 2021-01-29 RX ORDER — CHOLECALCIFEROL (VITAMIN D3) 125 MCG
5 CAPSULE ORAL NIGHTLY PRN
Status: DISCONTINUED | OUTPATIENT
Start: 2021-01-29 | End: 2021-02-01 | Stop reason: HOSPADM

## 2021-01-29 RX ORDER — SODIUM CHLORIDE 0.9 % (FLUSH) 0.9 %
10 SYRINGE (ML) INJECTION AS NEEDED
Status: DISCONTINUED | OUTPATIENT
Start: 2021-01-29 | End: 2021-02-01 | Stop reason: HOSPADM

## 2021-01-29 RX ORDER — MIRTAZAPINE 7.5 MG/1
7.5 TABLET, FILM COATED ORAL NIGHTLY
Status: DISCONTINUED | OUTPATIENT
Start: 2021-01-29 | End: 2021-02-01 | Stop reason: HOSPADM

## 2021-01-29 RX ORDER — MULTIPLE VITAMINS W/ MINERALS TAB 9MG-400MCG
1 TAB ORAL DAILY
Status: DISCONTINUED | OUTPATIENT
Start: 2021-01-30 | End: 2021-02-01 | Stop reason: HOSPADM

## 2021-01-29 RX ORDER — ACETAMINOPHEN 325 MG/1
650 TABLET ORAL EVERY 4 HOURS PRN
Status: DISCONTINUED | OUTPATIENT
Start: 2021-01-29 | End: 2021-02-01 | Stop reason: HOSPADM

## 2021-01-29 RX ORDER — MULTIPLE VITAMINS W/ MINERALS TAB 9MG-400MCG
1 TAB ORAL DAILY
COMMUNITY

## 2021-01-29 RX ADMIN — Medication 10 ML: at 17:28

## 2021-01-29 RX ADMIN — DIVALPROEX SODIUM 500 MG: 500 TABLET, DELAYED RELEASE ORAL at 21:49

## 2021-01-29 RX ADMIN — Medication 10 ML: at 21:50

## 2021-01-29 RX ADMIN — ATENOLOL 50 MG: 50 TABLET ORAL at 17:21

## 2021-01-29 RX ADMIN — AMLODIPINE BESYLATE 5 MG: 5 TABLET ORAL at 17:21

## 2021-01-29 RX ADMIN — INSULIN GLARGINE 20 UNITS: 100 INJECTION, SOLUTION SUBCUTANEOUS at 21:50

## 2021-01-29 RX ADMIN — QUETIAPINE FUMARATE 50 MG: 25 TABLET, FILM COATED ORAL at 21:49

## 2021-01-29 RX ADMIN — MEROPENEM 500 MG: 500 INJECTION, POWDER, FOR SOLUTION INTRAVENOUS at 17:21

## 2021-01-29 RX ADMIN — MIRTAZAPINE 7.5 MG: 7.5 TABLET ORAL at 21:49

## 2021-01-30 LAB
ANION GAP SERPL CALCULATED.3IONS-SCNC: 9 MMOL/L (ref 5–15)
BASOPHILS # BLD AUTO: 0 10*3/MM3 (ref 0–0.2)
BASOPHILS NFR BLD AUTO: 0.3 % (ref 0–1.5)
BUN SERPL-MCNC: 36 MG/DL (ref 8–23)
BUN/CREAT SERPL: 25.7 (ref 7–25)
CALCIUM SPEC-SCNC: 8.5 MG/DL (ref 8.6–10.5)
CHLORIDE SERPL-SCNC: 100 MMOL/L (ref 98–107)
CO2 SERPL-SCNC: 26 MMOL/L (ref 22–29)
CREAT SERPL-MCNC: 1.4 MG/DL (ref 0.76–1.27)
DEPRECATED RDW RBC AUTO: 51.2 FL (ref 37–54)
EOSINOPHIL # BLD AUTO: 0 10*3/MM3 (ref 0–0.4)
EOSINOPHIL NFR BLD AUTO: 0.3 % (ref 0.3–6.2)
ERYTHROCYTE [DISTWIDTH] IN BLOOD BY AUTOMATED COUNT: 16.1 % (ref 12.3–15.4)
GFR SERPL CREATININE-BSD FRML MDRD: 51 ML/MIN/1.73
GLUCOSE BLDC GLUCOMTR-MCNC: 113 MG/DL (ref 70–105)
GLUCOSE BLDC GLUCOMTR-MCNC: 142 MG/DL (ref 70–105)
GLUCOSE BLDC GLUCOMTR-MCNC: 61 MG/DL (ref 70–105)
GLUCOSE BLDC GLUCOMTR-MCNC: 99 MG/DL (ref 70–105)
GLUCOSE SERPL-MCNC: 78 MG/DL (ref 65–99)
HCT VFR BLD AUTO: 23.2 % (ref 37.5–51)
HGB BLD-MCNC: 7.6 G/DL (ref 13–17.7)
LYMPHOCYTES # BLD AUTO: 2.7 10*3/MM3 (ref 0.7–3.1)
LYMPHOCYTES NFR BLD AUTO: 36 % (ref 19.6–45.3)
MCH RBC QN AUTO: 29.4 PG (ref 26.6–33)
MCHC RBC AUTO-ENTMCNC: 32.8 G/DL (ref 31.5–35.7)
MCV RBC AUTO: 89.5 FL (ref 79–97)
MONOCYTES # BLD AUTO: 0.7 10*3/MM3 (ref 0.1–0.9)
MONOCYTES NFR BLD AUTO: 9.2 % (ref 5–12)
NEUTROPHILS NFR BLD AUTO: 4.1 10*3/MM3 (ref 1.7–7)
NEUTROPHILS NFR BLD AUTO: 54.2 % (ref 42.7–76)
NRBC BLD AUTO-RTO: 0.1 /100 WBC (ref 0–0.2)
PLATELET # BLD AUTO: 234 10*3/MM3 (ref 140–450)
PMV BLD AUTO: 7.2 FL (ref 6–12)
POTASSIUM SERPL-SCNC: 5.1 MMOL/L (ref 3.5–5.2)
RBC # BLD AUTO: 2.59 10*6/MM3 (ref 4.14–5.8)
SODIUM SERPL-SCNC: 135 MMOL/L (ref 136–145)
WBC # BLD AUTO: 7.5 10*3/MM3 (ref 3.4–10.8)

## 2021-01-30 PROCEDURE — 63710000001 INSULIN GLARGINE PER 5 UNITS: Performed by: NURSE PRACTITIONER

## 2021-01-30 PROCEDURE — 80048 BASIC METABOLIC PNL TOTAL CA: CPT | Performed by: NURSE PRACTITIONER

## 2021-01-30 PROCEDURE — 82962 GLUCOSE BLOOD TEST: CPT

## 2021-01-30 PROCEDURE — 85025 COMPLETE CBC W/AUTO DIFF WBC: CPT | Performed by: NURSE PRACTITIONER

## 2021-01-30 PROCEDURE — 96365 THER/PROPH/DIAG IV INF INIT: CPT

## 2021-01-30 PROCEDURE — 85046 RETICYTE/HGB CONCENTRATE: CPT | Performed by: INTERNAL MEDICINE

## 2021-01-30 PROCEDURE — G0378 HOSPITAL OBSERVATION PER HR: HCPCS

## 2021-01-30 PROCEDURE — 25010000002 MEROPENEM PER 100 MG: Performed by: NURSE PRACTITIONER

## 2021-01-30 PROCEDURE — 99225 PR SBSQ OBSERVATION CARE/DAY 25 MINUTES: CPT | Performed by: INTERNAL MEDICINE

## 2021-01-30 PROCEDURE — 96366 THER/PROPH/DIAG IV INF ADDON: CPT

## 2021-01-30 RX ADMIN — MEROPENEM 500 MG: 500 INJECTION, POWDER, FOR SOLUTION INTRAVENOUS at 10:03

## 2021-01-30 RX ADMIN — MIRTAZAPINE 7.5 MG: 7.5 TABLET ORAL at 21:22

## 2021-01-30 RX ADMIN — DIVALPROEX SODIUM 375 MG: 125 TABLET, DELAYED RELEASE ORAL at 10:00

## 2021-01-30 RX ADMIN — QUETIAPINE FUMARATE 50 MG: 25 TABLET, FILM COATED ORAL at 21:22

## 2021-01-30 RX ADMIN — MULTIPLE VITAMINS W/ MINERALS TAB 1 TABLET: TAB at 10:00

## 2021-01-30 RX ADMIN — INSULIN GLARGINE 20 UNITS: 100 INJECTION, SOLUTION SUBCUTANEOUS at 21:22

## 2021-01-30 RX ADMIN — ATENOLOL 50 MG: 50 TABLET ORAL at 10:00

## 2021-01-30 RX ADMIN — Medication 10 ML: at 10:00

## 2021-01-30 RX ADMIN — MEROPENEM 500 MG: 500 INJECTION, POWDER, FOR SOLUTION INTRAVENOUS at 01:09

## 2021-01-30 RX ADMIN — Medication 10 ML: at 21:22

## 2021-01-30 RX ADMIN — DIVALPROEX SODIUM 500 MG: 500 TABLET, DELAYED RELEASE ORAL at 21:22

## 2021-01-30 RX ADMIN — MEROPENEM 500 MG: 500 INJECTION, POWDER, FOR SOLUTION INTRAVENOUS at 17:38

## 2021-01-30 RX ADMIN — AMLODIPINE BESYLATE 5 MG: 5 TABLET ORAL at 10:00

## 2021-01-31 LAB
ANION GAP SERPL CALCULATED.3IONS-SCNC: 7 MMOL/L (ref 5–15)
BASOPHILS # BLD AUTO: 0 10*3/MM3 (ref 0–0.2)
BASOPHILS NFR BLD AUTO: 0.3 % (ref 0–1.5)
BUN SERPL-MCNC: 34 MG/DL (ref 8–23)
BUN/CREAT SERPL: 24.8 (ref 7–25)
CALCIUM SPEC-SCNC: 8.8 MG/DL (ref 8.6–10.5)
CHLORIDE SERPL-SCNC: 101 MMOL/L (ref 98–107)
CO2 SERPL-SCNC: 28 MMOL/L (ref 22–29)
CREAT SERPL-MCNC: 1.37 MG/DL (ref 0.76–1.27)
DEPRECATED RDW RBC AUTO: 49.9 FL (ref 37–54)
EOSINOPHIL # BLD AUTO: 0 10*3/MM3 (ref 0–0.4)
EOSINOPHIL NFR BLD AUTO: 0.6 % (ref 0.3–6.2)
ERYTHROCYTE [DISTWIDTH] IN BLOOD BY AUTOMATED COUNT: 15.9 % (ref 12.3–15.4)
GFR SERPL CREATININE-BSD FRML MDRD: 52 ML/MIN/1.73
GLUCOSE BLDC GLUCOMTR-MCNC: 101 MG/DL (ref 70–105)
GLUCOSE BLDC GLUCOMTR-MCNC: 108 MG/DL (ref 70–105)
GLUCOSE BLDC GLUCOMTR-MCNC: 108 MG/DL (ref 70–105)
GLUCOSE BLDC GLUCOMTR-MCNC: 118 MG/DL (ref 70–105)
GLUCOSE BLDC GLUCOMTR-MCNC: 66 MG/DL (ref 70–105)
GLUCOSE BLDC GLUCOMTR-MCNC: 68 MG/DL (ref 70–105)
GLUCOSE BLDC GLUCOMTR-MCNC: 92 MG/DL (ref 70–105)
GLUCOSE BLDC GLUCOMTR-MCNC: 97 MG/DL (ref 70–105)
GLUCOSE SERPL-MCNC: 65 MG/DL (ref 65–99)
HCT VFR BLD AUTO: 23.9 % (ref 37.5–51)
HGB BLD-MCNC: 7.9 G/DL (ref 13–17.7)
HGB RETIC QN AUTO: 27.5 PG (ref 29.8–36.1)
IMM RETICS NFR: 25.8 % (ref 3–15.8)
LYMPHOCYTES # BLD AUTO: 2.8 10*3/MM3 (ref 0.7–3.1)
LYMPHOCYTES NFR BLD AUTO: 36 % (ref 19.6–45.3)
MCH RBC QN AUTO: 29.2 PG (ref 26.6–33)
MCHC RBC AUTO-ENTMCNC: 33 G/DL (ref 31.5–35.7)
MCV RBC AUTO: 88.5 FL (ref 79–97)
MONOCYTES # BLD AUTO: 0.7 10*3/MM3 (ref 0.1–0.9)
MONOCYTES NFR BLD AUTO: 9.2 % (ref 5–12)
NEUTROPHILS NFR BLD AUTO: 4.2 10*3/MM3 (ref 1.7–7)
NEUTROPHILS NFR BLD AUTO: 53.9 % (ref 42.7–76)
NRBC BLD AUTO-RTO: 0.1 /100 WBC (ref 0–0.2)
PLATELET # BLD AUTO: 256 10*3/MM3 (ref 140–450)
PMV BLD AUTO: 7.3 FL (ref 6–12)
POTASSIUM SERPL-SCNC: 5.2 MMOL/L (ref 3.5–5.2)
RBC # BLD AUTO: 2.7 10*6/MM3 (ref 4.14–5.8)
RETICS # AUTO: 0.06 10*6/MM3 (ref 0.02–0.13)
RETICS/RBC NFR AUTO: 4.09 % (ref 0.7–1.9)
SODIUM SERPL-SCNC: 136 MMOL/L (ref 136–145)
WBC # BLD AUTO: 7.8 10*3/MM3 (ref 3.4–10.8)

## 2021-01-31 PROCEDURE — 82962 GLUCOSE BLOOD TEST: CPT

## 2021-01-31 PROCEDURE — 63710000001 INSULIN GLARGINE PER 5 UNITS: Performed by: INTERNAL MEDICINE

## 2021-01-31 PROCEDURE — 25010000002 MEROPENEM PER 100 MG: Performed by: NURSE PRACTITIONER

## 2021-01-31 PROCEDURE — 80048 BASIC METABOLIC PNL TOTAL CA: CPT | Performed by: INTERNAL MEDICINE

## 2021-01-31 PROCEDURE — G0378 HOSPITAL OBSERVATION PER HR: HCPCS

## 2021-01-31 PROCEDURE — 85025 COMPLETE CBC W/AUTO DIFF WBC: CPT | Performed by: INTERNAL MEDICINE

## 2021-01-31 PROCEDURE — 99225 PR SBSQ OBSERVATION CARE/DAY 25 MINUTES: CPT | Performed by: INTERNAL MEDICINE

## 2021-01-31 RX ORDER — INSULIN GLARGINE 100 [IU]/ML
10 INJECTION, SOLUTION SUBCUTANEOUS NIGHTLY
Status: DISCONTINUED | OUTPATIENT
Start: 2021-01-31 | End: 2021-02-01 | Stop reason: HOSPADM

## 2021-01-31 RX ORDER — DIVALPROEX SODIUM 500 MG/1
500 TABLET, DELAYED RELEASE ORAL EVERY 12 HOURS SCHEDULED
Status: DISCONTINUED | OUTPATIENT
Start: 2021-01-31 | End: 2021-02-01 | Stop reason: HOSPADM

## 2021-01-31 RX ADMIN — MELATONIN TAB 5 MG 5 MG: 5 TAB at 21:02

## 2021-01-31 RX ADMIN — AMLODIPINE BESYLATE 5 MG: 5 TABLET ORAL at 09:45

## 2021-01-31 RX ADMIN — DIVALPROEX SODIUM 500 MG: 500 TABLET, DELAYED RELEASE ORAL at 21:02

## 2021-01-31 RX ADMIN — DIVALPROEX SODIUM 375 MG: 125 TABLET, DELAYED RELEASE ORAL at 09:45

## 2021-01-31 RX ADMIN — QUETIAPINE FUMARATE 50 MG: 25 TABLET, FILM COATED ORAL at 21:02

## 2021-01-31 RX ADMIN — Medication 10 ML: at 21:02

## 2021-01-31 RX ADMIN — MEROPENEM 500 MG: 500 INJECTION, POWDER, FOR SOLUTION INTRAVENOUS at 19:29

## 2021-01-31 RX ADMIN — Medication 10 ML: at 09:45

## 2021-01-31 RX ADMIN — ATENOLOL 50 MG: 50 TABLET ORAL at 09:45

## 2021-01-31 RX ADMIN — MULTIPLE VITAMINS W/ MINERALS TAB 1 TABLET: TAB at 09:45

## 2021-01-31 RX ADMIN — INSULIN GLARGINE 10 UNITS: 100 INJECTION, SOLUTION SUBCUTANEOUS at 21:03

## 2021-01-31 RX ADMIN — MEROPENEM 500 MG: 500 INJECTION, POWDER, FOR SOLUTION INTRAVENOUS at 01:31

## 2021-01-31 RX ADMIN — MEROPENEM 500 MG: 500 INJECTION, POWDER, FOR SOLUTION INTRAVENOUS at 09:44

## 2021-01-31 RX ADMIN — MIRTAZAPINE 7.5 MG: 7.5 TABLET ORAL at 21:03

## 2021-02-01 VITALS
RESPIRATION RATE: 19 BRPM | SYSTOLIC BLOOD PRESSURE: 135 MMHG | OXYGEN SATURATION: 100 % | DIASTOLIC BLOOD PRESSURE: 79 MMHG | HEART RATE: 63 BPM | BODY MASS INDEX: 25.14 KG/M2 | HEIGHT: 69 IN | TEMPERATURE: 98.1 F | WEIGHT: 169.75 LBS

## 2021-02-01 LAB
ANION GAP SERPL CALCULATED.3IONS-SCNC: 6 MMOL/L (ref 5–15)
BASOPHILS # BLD AUTO: 0 10*3/MM3 (ref 0–0.2)
BASOPHILS NFR BLD AUTO: 0.4 % (ref 0–1.5)
BUN SERPL-MCNC: 33 MG/DL (ref 8–23)
BUN/CREAT SERPL: 24.3 (ref 7–25)
CALCIUM SPEC-SCNC: 8.9 MG/DL (ref 8.6–10.5)
CHLORIDE SERPL-SCNC: 99 MMOL/L (ref 98–107)
CO2 SERPL-SCNC: 26 MMOL/L (ref 22–29)
CREAT SERPL-MCNC: 1.36 MG/DL (ref 0.76–1.27)
DEPRECATED RDW RBC AUTO: 49.9 FL (ref 37–54)
EOSINOPHIL # BLD AUTO: 0 10*3/MM3 (ref 0–0.4)
EOSINOPHIL NFR BLD AUTO: 0.5 % (ref 0.3–6.2)
ERYTHROCYTE [DISTWIDTH] IN BLOOD BY AUTOMATED COUNT: 15.7 % (ref 12.3–15.4)
GFR SERPL CREATININE-BSD FRML MDRD: 53 ML/MIN/1.73
GLUCOSE BLDC GLUCOMTR-MCNC: 107 MG/DL (ref 70–105)
GLUCOSE BLDC GLUCOMTR-MCNC: 125 MG/DL (ref 70–105)
GLUCOSE BLDC GLUCOMTR-MCNC: 76 MG/DL (ref 70–105)
GLUCOSE SERPL-MCNC: 73 MG/DL (ref 65–99)
HCT VFR BLD AUTO: 24.5 % (ref 37.5–51)
HGB BLD-MCNC: 7.9 G/DL (ref 13–17.7)
LYMPHOCYTES # BLD AUTO: 3.2 10*3/MM3 (ref 0.7–3.1)
LYMPHOCYTES NFR BLD AUTO: 40 % (ref 19.6–45.3)
MCH RBC QN AUTO: 28.8 PG (ref 26.6–33)
MCHC RBC AUTO-ENTMCNC: 32.4 G/DL (ref 31.5–35.7)
MCV RBC AUTO: 88.8 FL (ref 79–97)
MONOCYTES # BLD AUTO: 0.7 10*3/MM3 (ref 0.1–0.9)
MONOCYTES NFR BLD AUTO: 8.3 % (ref 5–12)
NEUTROPHILS NFR BLD AUTO: 4 10*3/MM3 (ref 1.7–7)
NEUTROPHILS NFR BLD AUTO: 50.8 % (ref 42.7–76)
NRBC BLD AUTO-RTO: 0.1 /100 WBC (ref 0–0.2)
PLATELET # BLD AUTO: 243 10*3/MM3 (ref 140–450)
PMV BLD AUTO: 7.3 FL (ref 6–12)
POTASSIUM SERPL-SCNC: 5.4 MMOL/L (ref 3.5–5.2)
RBC # BLD AUTO: 2.76 10*6/MM3 (ref 4.14–5.8)
SODIUM SERPL-SCNC: 131 MMOL/L (ref 136–145)
WBC # BLD AUTO: 7.9 10*3/MM3 (ref 3.4–10.8)

## 2021-02-01 PROCEDURE — 82962 GLUCOSE BLOOD TEST: CPT

## 2021-02-01 PROCEDURE — G0378 HOSPITAL OBSERVATION PER HR: HCPCS

## 2021-02-01 PROCEDURE — 85025 COMPLETE CBC W/AUTO DIFF WBC: CPT | Performed by: INTERNAL MEDICINE

## 2021-02-01 PROCEDURE — 99217 PR OBSERVATION CARE DISCHARGE MANAGEMENT: CPT | Performed by: FAMILY MEDICINE

## 2021-02-01 PROCEDURE — 80048 BASIC METABOLIC PNL TOTAL CA: CPT | Performed by: INTERNAL MEDICINE

## 2021-02-01 RX ORDER — INSULIN GLARGINE 100 [IU]/ML
10 INJECTION, SOLUTION SUBCUTANEOUS NIGHTLY
Refills: 12
Start: 2021-02-01

## 2021-02-01 RX ORDER — DIVALPROEX SODIUM 500 MG/1
500 TABLET, DELAYED RELEASE ORAL 2 TIMES DAILY
Qty: 60 TABLET | Refills: 0 | Status: SHIPPED | OUTPATIENT
Start: 2021-02-01 | End: 2021-03-03

## 2021-02-01 RX ORDER — DOXYCYCLINE HYCLATE 50 MG/1
324 CAPSULE, GELATIN COATED ORAL
Qty: 90 TABLET | Refills: 0 | Status: SHIPPED | OUTPATIENT
Start: 2021-02-01 | End: 2021-03-03

## 2021-02-01 RX ADMIN — AMLODIPINE BESYLATE 5 MG: 5 TABLET ORAL at 08:46

## 2021-02-01 RX ADMIN — MULTIPLE VITAMINS W/ MINERALS TAB 1 TABLET: TAB at 08:46

## 2021-02-01 RX ADMIN — ATENOLOL 50 MG: 50 TABLET ORAL at 08:46

## 2021-02-01 RX ADMIN — Medication 10 ML: at 08:46

## 2021-02-01 RX ADMIN — DIVALPROEX SODIUM 500 MG: 500 TABLET, DELAYED RELEASE ORAL at 08:46

## 2021-02-01 NOTE — PROGRESS NOTES
Continued Stay Note  JOHNIE William     Patient Name: Ronald Reyer  MRN: [id number]  Today's Date: 2/1/2021    Admit Date: 1/29/2021    Discharge Plan     Row Name 02/01/21 1342       Plan    Plan  DC Plan: Ok t retun to [facility] and rehab per liasion. No PASRR or pre cert required.        Discharge Codes    No documentation.       Expected Discharge Date and Time     Expected Discharge Date Expected Discharge Time    Feb 1, 2021         Esvin ALVARADO   Cell: [phone]  Office: [phone]  Fax: [phone]

## 2021-02-01 NOTE — PLAN OF CARE
Continue IV antibiotics. No complaints of pain. Continue current plan of care.   Problem: Adult Inpatient Plan of Care  Goal: Plan of Care Review  Outcome: Ongoing, Progressing  Goal: Patient-Specific Goal (Individualized)  Outcome: Ongoing, Progressing  Goal: Absence of Hospital-Acquired Illness or Injury  Outcome: Ongoing, Progressing  Intervention: Identify and Manage Fall Risk  Recent Flowsheet Documentation  Taken 1/31/2021 1500 by Lela Will RN  Safety Promotion/Fall Prevention:   assistive device/personal items within reach   clutter free environment maintained   fall prevention program maintained   nonskid shoes/slippers when out of bed   room organization consistent   safety round/check completed  Taken 1/31/2021 1300 by Lela Will RN  Safety Promotion/Fall Prevention:   assistive device/personal items within reach   clutter free environment maintained   fall prevention program maintained   nonskid shoes/slippers when out of bed   room organization consistent   safety round/check completed  Taken 1/31/2021 1100 by Lela Will RN  Safety Promotion/Fall Prevention:   assistive device/personal items within reach   clutter free environment maintained   fall prevention program maintained   nonskid shoes/slippers when out of bed   room organization consistent   safety round/check completed  Taken 1/31/2021 0900 by Lela Will, RN  Safety Promotion/Fall Prevention:   assistive device/personal items within reach   clutter free environment maintained   fall prevention program maintained   nonskid shoes/slippers when out of bed   room organization consistent   safety round/check completed  Taken 1/31/2021 0705 by Lela Will RN  Safety Promotion/Fall Prevention:   assistive device/personal items within reach   clutter free environment maintained   fall prevention program maintained   nonskid shoes/slippers when out of bed   room organization consistent   safety round/check  completed  Intervention: Prevent Infection  Recent Flowsheet Documentation  Taken 1/31/2021 1500 by Lela Will RN  Infection Prevention:   environmental surveillance performed   equipment surfaces disinfected   hand hygiene promoted   personal protective equipment utilized   rest/sleep promoted   single patient room provided   visitors restricted/screened  Taken 1/31/2021 1300 by Lela Will RN  Infection Prevention:   environmental surveillance performed   equipment surfaces disinfected   hand hygiene promoted   personal protective equipment utilized   rest/sleep promoted   single patient room provided   visitors restricted/screened  Taken 1/31/2021 1100 by Lela Will RN  Infection Prevention:   environmental surveillance performed   equipment surfaces disinfected   hand hygiene promoted   personal protective equipment utilized   single patient room provided   visitors restricted/screened   rest/sleep promoted  Taken 1/31/2021 0900 by Lela Will RN  Infection Prevention:   environmental surveillance performed   hand hygiene promoted   equipment surfaces disinfected   personal protective equipment utilized   rest/sleep promoted   single patient room provided   visitors restricted/screened  Taken 1/31/2021 0705 by Lela Will RN  Infection Prevention:   environmental surveillance performed   equipment surfaces disinfected   hand hygiene promoted   personal protective equipment utilized   single patient room provided   rest/sleep promoted   visitors restricted/screened  Goal: Optimal Comfort and Wellbeing  Outcome: Ongoing, Progressing  Goal: Readiness for Transition of Care  Outcome: Ongoing, Progressing   Goal Outcome Evaluation:

## 2021-02-01 NOTE — PLAN OF CARE
Goal Outcome Evaluation:         Patient resting abed, no distress noted. Patient changes positions independently. IV antibiotics continue and tolerated well. Will continue to monitor.

## 2021-02-01 NOTE — DISCHARGE SUMMARY
Date of Admission: 1/29/2021    Date of Discharge:  2/1/2021    Length of stay:  LOS: 0 days     Discharge Diagnosis:     Normocytic anemia, chronic    Presenting Problem List:    Normocytic anemia-likely multifactorial given renal disease as well as recent infection and prolonged inflammatory state  -Start oral iron supplementation with meals  -Check CBC every 3 days in rehab  -Referral to hematology  -Hemoglobin stable over hospital course did not require transfusion  -Avoid NSAIDs for any GI source  -Consider GI evaluation though fecal occult blood negative in hospital    ESBL E. coli bacteremia-secondary to urinary source.  Patient finished 2-week course while hospitalized  -Patient off antibiotics  -No midline needed    Renal insufficiency-patient with some signs of chronic renal disease likely contributory towards anemia  -Continue monitoring creatinine with primary care  -Iron pills    Type 2 diabetes-patient borderline low blood sugars  -Long-acting insulin decreased can increase as needed in rehab    Seizure disorder-patient's valproic acid level found to be low on admission and Depakote increased to 500 mg twice daily  -Follow-up outpatient neurology for continued monitoring    Hypertension/bipolar disorder/insomnia/schizophrenia-chronic in nature  -Resume home medication as clinically appropriate  -Follow-up outpatient behavioral health providers    HPI/Hospital Course:    The patient is a 65-year-old male with multiple psychiatric problems in addition to his renal insufficiency and most recent diagnosis of an ESBL E. coli infection for which she is still receiving meropenem.  The patient presented with symptomatic anemia.  The patient has normochromic normocytic indices.  The patient did test negative for occult blood in his stool.  Work-up is in process.     Date:  1/31/2021  The patient is still not very talkative and really cannot do a review of systems.  The patient offers no new complaints.  I did  speak with nursing and they had no reports of issues that they were aware of at this time.    2/1/2021: Patient with no new complaints.  Hemoglobin stable.  No chest pain or shortness of breath.  Starting patient on oral iron pills.  Fecal occult blood negative.  Given hemoglobin stable patient set up with outpatient hematology and able to be discharged back to rehab facility in good condition with follow-up organized with primary care.  Patient finished IV antibiotics in hospital for previous bacteremia.  Strict return precautions given.    Procedures Performed         Consults:   Consults     Date and Time Order Name Status Description    1/29/2021 1325 Hospitalist (on-call MD unless specified) Completed     1/3/2021 1741 Inpatient Pulmonology Consult Completed     1/3/2021 1207 Inpatient Nephrology Consult Completed     1/1/2021 2007 Hospitalist (on-call MD unless specified) Completed           Pertinent Test Results:     Lab Results (last 24 hours)     Procedure Component Value Units Date/Time    POC Glucose Once [[id number]]  (Abnormal) Collected: 02/01/21 1110    Specimen: Blood Updated: 02/01/21 1136     Glucose 107 mg/dL      Comment: Serial Number: [id number]:  [id number]       POC Glucose Once [[id number]]  (Normal) Collected: 02/01/21 0703    Specimen: Blood Updated: 02/01/21 0826     Glucose 76 mg/dL      Comment: Serial Number: [id number]:  [id number]       Basic Metabolic Panel [[id number]]  (Abnormal) Collected: 02/01/21 0535    Specimen: Blood Updated: 02/01/21 0651     Glucose 73 mg/dL      BUN 33 mg/dL      Creatinine 1.36 mg/dL      Sodium 131 mmol/L      Potassium 5.4 mmol/L      Chloride 99 mmol/L      CO2 26.0 mmol/L      Calcium 8.9 mg/dL      eGFR Non African Amer 53 mL/min/1.73      BUN/Creatinine Ratio 24.3     Anion Gap 6.0 mmol/L     Narrative:      GFR Normal >60  Chronic Kidney Disease <60  Kidney Failure <15      CBC & Differential [[id number]]  (Abnormal) Collected: 02/01/21  0535    Specimen: Blood Updated: 02/01/21 0624    Narrative:      The following orders were created for panel order CBC & Differential.  Procedure                               Abnormality         Status                     ---------                               -----------         ------                     CBC Auto Differential[[id number]]        Abnormal            Final result                 Please view results for these tests on the individual orders.    CBC Auto Differential [[id number]]  (Abnormal) Collected: 02/01/21 0535    Specimen: Blood Updated: 02/01/21 0624     WBC 7.90 10*3/mm3      RBC 2.76 10*6/mm3      Hemoglobin 7.9 g/dL      Hematocrit 24.5 %      MCV 88.8 fL      MCH 28.8 pg      MCHC 32.4 g/dL      RDW 15.7 %      RDW-SD 49.9 fl      MPV 7.3 fL      Platelets 243 10*3/mm3      Neutrophil % 50.8 %      Lymphocyte % 40.0 %      Monocyte % 8.3 %      Eosinophil % 0.5 %      Basophil % 0.4 %      Neutrophils, Absolute 4.00 10*3/mm3      Lymphocytes, Absolute 3.20 10*3/mm3      Monocytes, Absolute 0.70 10*3/mm3      Eosinophils, Absolute 0.00 10*3/mm3      Basophils, Absolute 0.00 10*3/mm3      nRBC 0.1 /100 WBC     POC Glucose Once [[id number]]  (Normal) Collected: 01/31/21 2024    Specimen: Blood Updated: 01/31/21 2125     Glucose 101 mg/dL      Comment: Serial Number: [id number]:  [id number]       POC Glucose Once [[id number]]  (Abnormal) Collected: 01/31/21 1607    Specimen: Blood Updated: 01/31/21 1953     Glucose 118 mg/dL      Comment: Serial Number: [id number]:  [id number]             Microbiology Results Abnormal     None        No radiology results for the last day    Results for orders placed during the hospital encounter of 01/01/21   Adult Transthoracic Echo Complete W/ Cont if Necessary Per Protocol    Narrative · Estimated left ventricular EF = 60% Estimated left ventricular EF was in   agreement with the calculated left ventricular EF. Left ventricular   systolic function  is normal.  · Estimated right ventricular systolic pressure from tricuspid   regurgitation is moderately elevated (45-55 mmHg).  · Moderate pulmonary hypertension is present.            Condition on Discharge:  Good    Vital Signs  Temp:  [97.8 °F (36.6 °C)-98.8 °F (37.1 °C)] 98.1 °F (36.7 °C)  Heart Rate:  [63-71] 63  Resp:  [17-19] 19  BP: (129-169)/(73-80) 135/79    Physical Exam:  General: Elderly male lying in bed breathing comfortably on room air no acute distress  HEENT: NC/AT, EOMI, mucosa moist  Heart: Regular, rate controlled  Chest: Normal work of breathing, moving air well no wheezing  Abdominal: Soft. NT/ND.   Musculoskeletal: Normal ROM.  No cyanosis. No calf tenderness.  Neurological: Awake and alert, no focal deficits  Skin: Skin is warm and dry. No rash  Psychiatric: Flat affect.      Discharge Disposition  Rehab Facility or Unit (DC - External) [62]    Discharge Medications     Discharge Medications      New Medications      Instructions Start Date   ferrous gluconate 324 MG tablet  Commonly known as: FERGON   324 mg, Oral, 3 Times Daily With Meals         Changes to Medications      Instructions Start Date   divalproex 500 MG DR tablet  Commonly known as: DEPAKOTE  What changed:   · when to take this  · Another medication with the same name was removed. Continue taking this medication, and follow the directions you see here.   500 mg, Oral, 2 Times Daily      insulin glargine 100 UNIT/ML injection  Commonly known as: LANTUS, SEMGLEE  What changed: how much to take   10 Units, Subcutaneous, Nightly         Continue These Medications      Instructions Start Date   acetaminophen 325 MG tablet  Commonly known as: TYLENOL   650 mg, Oral, Every 4 Hours PRN      amLODIPine 5 MG tablet  Commonly known as: NORVASC   5 mg, Oral, Every 24 Hours Scheduled      atenolol 50 MG tablet  Commonly known as: TENORMIN   50 mg, Oral, Daily      fluPHENAZine 1 MG tablet  Commonly known as: PROLIXIN   1 mg, Oral, 3  Times Daily      GlucaGen HypoKit 1 MG injection  Generic drug: glucagon   1 mg, Intravenous, As Needed      melatonin 5 MG tablet tablet   10 mg, Oral, Nightly      Menthol (Topical Analgesic) 5 % gel   1 application, Apply externally, Every 12 Hours PRN, Apply to both feet,legs,back, & hip       mirtazapine 7.5 MG tablet  Commonly known as: REMERON   7.5 mg, Oral, Nightly      multivitamin with minerals tablet tablet   1 tablet, Oral, Daily      promethazine 12.5 MG tablet  Commonly known as: PHENERGAN   12.5 mg, Oral, Every 6 Hours PRN      QUEtiapine 50 MG tablet  Commonly known as: SEROquel   50 mg, Oral, Nightly         Stop These Medications    ibuprofen 800 MG tablet  Commonly known as: ADVILMOTRIN     meropenem (MERREM) IVPB 500 mg in 100 mL NS            Discharge Diet:   Diet Instructions     Diet: Consistent Carbohydrate      Discharge Diet: Consistent Carbohydrate          Activity at Discharge:   Activity Instructions     Activity as Tolerated            Follow-up Appointments  No future appointments.  Additional Instructions for the Follow-ups that You Need to Schedule     Ambulatory Referral to Hematology / Oncology   As directed            Test Results Pending at Discharge       Risk for Readmission (LACE) Score: 8 (2/1/2021  6:00 AM)        Hussain Keller MD  02/01/21  13:19 EST      Time: Discharge 25 min total time spent with face-to-face history/exam, writing all prescriptions, preparing medication reconciliation, and documenting discharge data including chart review of the full admission, care co-ordination with patient, family, , and , etc., and follow-up appointments with PCP and specialists as recommended.

## 2021-02-02 NOTE — PROGRESS NOTES
Case Management Discharge Note      Final Note: [facility]         Selected Continued Care - Discharged on 2/1/2021 Admission date: 1/29/2021 - Discharge disposition: Rehab Facility or Unit (DC - External)                        Selected Continued Care - Prior Encounters Includes selections from prior encounters from 10/31/2020 to 2/1/2021    Discharged on 1/8/2021 Admission date: 1/1/2021 - Discharge disposition: Intermediate Care     Destination     Service Provider Selected Services Address Phone Fax Patient Preferred    [facility]  Assisted Living [address] [facility] [place] [address] [phone]                          Final Discharge Disposition Code: 04 - intermediate care facility